# Patient Record
Sex: MALE | Race: WHITE | NOT HISPANIC OR LATINO | Employment: FULL TIME | URBAN - METROPOLITAN AREA
[De-identification: names, ages, dates, MRNs, and addresses within clinical notes are randomized per-mention and may not be internally consistent; named-entity substitution may affect disease eponyms.]

---

## 2021-01-01 ENCOUNTER — APPOINTMENT (OUTPATIENT)
Dept: GENERAL RADIOLOGY | Facility: HOSPITAL | Age: 46
End: 2021-01-01

## 2021-01-01 ENCOUNTER — ANESTHESIA EVENT (OUTPATIENT)
Dept: GASTROENTEROLOGY | Facility: HOSPITAL | Age: 46
End: 2021-01-01

## 2021-01-01 ENCOUNTER — APPOINTMENT (OUTPATIENT)
Dept: CT IMAGING | Facility: HOSPITAL | Age: 46
End: 2021-01-01

## 2021-01-01 ENCOUNTER — APPOINTMENT (OUTPATIENT)
Dept: ULTRASOUND IMAGING | Facility: HOSPITAL | Age: 46
End: 2021-01-01

## 2021-01-01 ENCOUNTER — ANESTHESIA (OUTPATIENT)
Dept: GASTROENTEROLOGY | Facility: HOSPITAL | Age: 46
End: 2021-01-01

## 2021-01-01 ENCOUNTER — APPOINTMENT (OUTPATIENT)
Dept: CARDIOLOGY | Facility: HOSPITAL | Age: 46
End: 2021-01-01

## 2021-01-01 ENCOUNTER — APPOINTMENT (OUTPATIENT)
Dept: MRI IMAGING | Facility: HOSPITAL | Age: 46
End: 2021-01-01

## 2021-01-01 ENCOUNTER — HOSPITAL ENCOUNTER (INPATIENT)
Facility: HOSPITAL | Age: 46
LOS: 14 days | End: 2021-09-04
Attending: EMERGENCY MEDICINE | Admitting: INTERNAL MEDICINE

## 2021-01-01 VITALS
TEMPERATURE: 100 F | OXYGEN SATURATION: 91 % | DIASTOLIC BLOOD PRESSURE: 59 MMHG | BODY MASS INDEX: 34.18 KG/M2 | SYSTOLIC BLOOD PRESSURE: 128 MMHG | HEIGHT: 70 IN | RESPIRATION RATE: 20 BRPM | HEART RATE: 64 BPM | WEIGHT: 238.76 LBS

## 2021-01-01 VITALS — HEART RATE: 59 BPM | RESPIRATION RATE: 28 BRPM | OXYGEN SATURATION: 89 %

## 2021-01-01 DIAGNOSIS — I71.00 DISSECTION OF AORTA, UNSPECIFIED PORTION OF AORTA (HCC): Primary | ICD-10-CM

## 2021-01-01 DIAGNOSIS — J96.01 ACUTE RESPIRATORY FAILURE WITH HYPOXIA (HCC): ICD-10-CM

## 2021-01-01 DIAGNOSIS — J18.9 PNEUMONIA OF BOTH LUNGS DUE TO INFECTIOUS ORGANISM, UNSPECIFIED PART OF LUNG: ICD-10-CM

## 2021-01-01 DIAGNOSIS — N17.9 AKI (ACUTE KIDNEY INJURY) (HCC): ICD-10-CM

## 2021-01-01 DIAGNOSIS — J18.9 PNEUMONIA DUE TO INFECTIOUS ORGANISM, UNSPECIFIED LATERALITY, UNSPECIFIED PART OF LUNG: ICD-10-CM

## 2021-01-01 LAB
ALBUMIN SERPL-MCNC: 2.5 G/DL (ref 3.5–5.2)
ALBUMIN SERPL-MCNC: 2.8 G/DL (ref 3.5–5.2)
ALBUMIN SERPL-MCNC: 2.9 G/DL (ref 3.5–5.2)
ALBUMIN SERPL-MCNC: 3 G/DL (ref 3.5–5.2)
ALBUMIN SERPL-MCNC: 3.1 G/DL (ref 3.5–5.2)
ALBUMIN SERPL-MCNC: 3.2 G/DL (ref 3.5–5.2)
ALBUMIN SERPL-MCNC: 4.4 G/DL (ref 3.5–5.2)
ALBUMIN/GLOB SERPL: 0.7 G/DL
ALBUMIN/GLOB SERPL: 0.7 G/DL
ALBUMIN/GLOB SERPL: 0.8 G/DL
ALBUMIN/GLOB SERPL: 0.8 G/DL
ALBUMIN/GLOB SERPL: 0.9 G/DL
ALBUMIN/GLOB SERPL: 1 G/DL
ALBUMIN/GLOB SERPL: 1.1 G/DL
ALBUMIN/GLOB SERPL: 1.2 G/DL
ALBUMIN/GLOB SERPL: 1.2 G/DL
ALDOST SERPL-MCNC: 23 NG/DL (ref 0–30)
ALP SERPL-CCNC: 101 U/L (ref 39–117)
ALP SERPL-CCNC: 101 U/L (ref 39–117)
ALP SERPL-CCNC: 104 U/L (ref 39–117)
ALP SERPL-CCNC: 106 U/L (ref 39–117)
ALP SERPL-CCNC: 108 U/L (ref 39–117)
ALP SERPL-CCNC: 110 U/L (ref 39–117)
ALP SERPL-CCNC: 111 U/L (ref 39–117)
ALP SERPL-CCNC: 115 U/L (ref 39–117)
ALP SERPL-CCNC: 116 U/L (ref 39–117)
ALP SERPL-CCNC: 143 U/L (ref 39–117)
ALP SERPL-CCNC: 82 U/L (ref 39–117)
ALP SERPL-CCNC: 93 U/L (ref 39–117)
ALP SERPL-CCNC: 98 U/L (ref 39–117)
ALP SERPL-CCNC: 99 U/L (ref 39–117)
ALT SERPL W P-5'-P-CCNC: 24 U/L (ref 1–41)
ALT SERPL W P-5'-P-CCNC: 26 U/L (ref 1–41)
ALT SERPL W P-5'-P-CCNC: 27 U/L (ref 1–41)
ALT SERPL W P-5'-P-CCNC: 28 U/L (ref 1–41)
ALT SERPL W P-5'-P-CCNC: 29 U/L (ref 1–41)
ALT SERPL W P-5'-P-CCNC: 31 U/L (ref 1–41)
ALT SERPL W P-5'-P-CCNC: 35 U/L (ref 1–41)
ALT SERPL W P-5'-P-CCNC: 43 U/L (ref 1–41)
ALT SERPL W P-5'-P-CCNC: 47 U/L (ref 1–41)
ALT SERPL W P-5'-P-CCNC: 74 U/L (ref 1–41)
ALT SERPL W P-5'-P-CCNC: 81 U/L (ref 1–41)
ALT SERPL W P-5'-P-CCNC: 92 U/L (ref 1–41)
AMMONIA BLD-SCNC: 22 UMOL/L (ref 16–60)
AMMONIA BLD-SCNC: 33 UMOL/L (ref 16–60)
AMORPH URATE CRY URNS QL MICRO: ABNORMAL /HPF
ANION GAP SERPL CALCULATED.3IONS-SCNC: 10 MMOL/L (ref 5–15)
ANION GAP SERPL CALCULATED.3IONS-SCNC: 11 MMOL/L (ref 5–15)
ANION GAP SERPL CALCULATED.3IONS-SCNC: 12 MMOL/L (ref 5–15)
ANION GAP SERPL CALCULATED.3IONS-SCNC: 13 MMOL/L (ref 5–15)
ANION GAP SERPL CALCULATED.3IONS-SCNC: 14 MMOL/L (ref 5–15)
ANION GAP SERPL CALCULATED.3IONS-SCNC: 14 MMOL/L (ref 5–15)
ANION GAP SERPL CALCULATED.3IONS-SCNC: 8 MMOL/L (ref 5–15)
ANION GAP SERPL CALCULATED.3IONS-SCNC: 9 MMOL/L (ref 5–15)
APTT PPP: 28.5 SECONDS (ref 24–31)
ARTERIAL PATENCY WRIST A: ABNORMAL
ARTERIAL PATENCY WRIST A: POSITIVE
AST SERPL-CCNC: 13 U/L (ref 1–40)
AST SERPL-CCNC: 14 U/L (ref 1–40)
AST SERPL-CCNC: 17 U/L (ref 1–40)
AST SERPL-CCNC: 18 U/L (ref 1–40)
AST SERPL-CCNC: 18 U/L (ref 1–40)
AST SERPL-CCNC: 19 U/L (ref 1–40)
AST SERPL-CCNC: 20 U/L (ref 1–40)
AST SERPL-CCNC: 26 U/L (ref 1–40)
AST SERPL-CCNC: 29 U/L (ref 1–40)
AST SERPL-CCNC: 29 U/L (ref 1–40)
AST SERPL-CCNC: 31 U/L (ref 1–40)
AST SERPL-CCNC: 38 U/L (ref 1–40)
AST SERPL-CCNC: 55 U/L (ref 1–40)
AST SERPL-CCNC: 56 U/L (ref 1–40)
ATMOSPHERIC PRESS: ABNORMAL MM[HG]
B PARAPERT DNA SPEC QL NAA+PROBE: NOT DETECTED
B PERT DNA SPEC QL NAA+PROBE: DETECTED
B PERT DNA SPEC QL NAA+PROBE: NOT DETECTED
BACTERIA SPEC AEROBE CULT: ABNORMAL
BACTERIA SPEC AEROBE CULT: ABNORMAL
BACTERIA SPEC AEROBE CULT: NORMAL
BACTERIA SPEC RESP CULT: ABNORMAL
BACTERIA SPEC RESP CULT: ABNORMAL
BACTERIA SPEC RESP CULT: NO GROWTH
BACTERIA SPEC RESP CULT: NORMAL
BACTERIA UR QL AUTO: ABNORMAL /HPF
BACTERIA UR QL AUTO: ABNORMAL /HPF
BASE EXCESS BLDA CALC-SCNC: -0.2 MMOL/L (ref 0–3)
BASE EXCESS BLDA CALC-SCNC: -0.5 MMOL/L (ref 0–3)
BASE EXCESS BLDA CALC-SCNC: -0.6 MMOL/L (ref 0–3)
BASE EXCESS BLDA CALC-SCNC: -0.7 MMOL/L (ref 0–3)
BASE EXCESS BLDA CALC-SCNC: -0.8 MMOL/L (ref 0–3)
BASE EXCESS BLDA CALC-SCNC: -1.6 MMOL/L (ref 0–3)
BASE EXCESS BLDA CALC-SCNC: -1.9 MMOL/L (ref 0–3)
BASE EXCESS BLDA CALC-SCNC: -2.4 MMOL/L (ref 0–3)
BASE EXCESS BLDA CALC-SCNC: -2.6 MMOL/L (ref 0–3)
BASE EXCESS BLDA CALC-SCNC: -2.7 MMOL/L (ref 0–3)
BASE EXCESS BLDA CALC-SCNC: -3 MMOL/L (ref 0–3)
BASE EXCESS BLDA CALC-SCNC: -3.2 MMOL/L (ref 0–3)
BASE EXCESS BLDA CALC-SCNC: -3.3 MMOL/L (ref 0–3)
BASE EXCESS BLDA CALC-SCNC: -3.6 MMOL/L (ref 0–3)
BASE EXCESS BLDA CALC-SCNC: -3.7 MMOL/L (ref 0–3)
BASE EXCESS BLDA CALC-SCNC: -3.7 MMOL/L (ref 0–3)
BASE EXCESS BLDA CALC-SCNC: -3.9 MMOL/L (ref 0–3)
BASE EXCESS BLDA CALC-SCNC: -4 MMOL/L (ref 0–3)
BASE EXCESS BLDA CALC-SCNC: -4.2 MMOL/L (ref 0–3)
BASE EXCESS BLDA CALC-SCNC: -4.2 MMOL/L (ref 0–3)
BASE EXCESS BLDA CALC-SCNC: -4.3 MMOL/L (ref 0–3)
BASE EXCESS BLDA CALC-SCNC: -5 MMOL/L (ref 0–3)
BASE EXCESS BLDA CALC-SCNC: -5.5 MMOL/L (ref 0–3)
BASE EXCESS BLDA CALC-SCNC: -7.7 MMOL/L (ref 0–3)
BASE EXCESS BLDA CALC-SCNC: -8.5 MMOL/L (ref 0–3)
BASE EXCESS BLDA CALC-SCNC: 2 MMOL/L (ref 0–3)
BASE EXCESS BLDV CALC-SCNC: -3.7 MMOL/L (ref -2–2)
BASOPHILS # BLD AUTO: 0 10*3/MM3 (ref 0–0.2)
BASOPHILS # BLD AUTO: 0.1 10*3/MM3 (ref 0–0.2)
BASOPHILS # BLD AUTO: 0.1 10*3/MM3 (ref 0–0.2)
BASOPHILS # BLD AUTO: 0.2 10*3/MM3 (ref 0–0.2)
BASOPHILS NFR BLD AUTO: 0.2 % (ref 0–1.5)
BASOPHILS NFR BLD AUTO: 0.2 % (ref 0–1.5)
BASOPHILS NFR BLD AUTO: 0.3 % (ref 0–1.5)
BASOPHILS NFR BLD AUTO: 0.3 % (ref 0–1.5)
BASOPHILS NFR BLD AUTO: 0.6 % (ref 0–1.5)
BASOPHILS NFR BLD AUTO: 1.1 % (ref 0–1.5)
BDY SITE: ABNORMAL
BH CV ECHO MEAS - % IVS THICK: 32.4 %
BH CV ECHO MEAS - % LVPW THICK: 56.3 %
BH CV ECHO MEAS - ACS: 2.1 CM
BH CV ECHO MEAS - AO MAX PG (FULL): 0.26 MMHG
BH CV ECHO MEAS - AO MAX PG: 8.4 MMHG
BH CV ECHO MEAS - AO MEAN PG (FULL): -0.25 MMHG
BH CV ECHO MEAS - AO MEAN PG: 4.1 MMHG
BH CV ECHO MEAS - AO ROOT AREA (BSA CORRECTED): 1.5
BH CV ECHO MEAS - AO ROOT AREA: 7.7 CM^2
BH CV ECHO MEAS - AO ROOT DIAM: 3.1 CM
BH CV ECHO MEAS - AO V2 MAX: 145.2 CM/SEC
BH CV ECHO MEAS - AO V2 MEAN: 94.1 CM/SEC
BH CV ECHO MEAS - AO V2 VTI: 23.1 CM
BH CV ECHO MEAS - AVA(I,A): 3.8 CM^2
BH CV ECHO MEAS - AVA(I,D): 3.8 CM^2
BH CV ECHO MEAS - AVA(V,A): 3.4 CM^2
BH CV ECHO MEAS - AVA(V,D): 3.4 CM^2
BH CV ECHO MEAS - BSA(HAYCOCK): 2.2 M^2
BH CV ECHO MEAS - BSA(HAYCOCK): 2.3 M^2
BH CV ECHO MEAS - BSA: 2.1 M^2
BH CV ECHO MEAS - BSA: 2.2 M^2
BH CV ECHO MEAS - BZI_BMI: 29.7 KILOGRAMS/M^2
BH CV ECHO MEAS - BZI_BMI: 32.4 KILOGRAMS/M^2
BH CV ECHO MEAS - BZI_METRIC_HEIGHT: 177.8 CM
BH CV ECHO MEAS - BZI_METRIC_HEIGHT: 177.8 CM
BH CV ECHO MEAS - BZI_METRIC_WEIGHT: 102.5 KG
BH CV ECHO MEAS - BZI_METRIC_WEIGHT: 93.9 KG
BH CV ECHO MEAS - EDV(CUBED): 130.2 ML
BH CV ECHO MEAS - EDV(MOD-SP4): 110.8 ML
BH CV ECHO MEAS - EDV(MOD-SP4): 77.3 ML
BH CV ECHO MEAS - EDV(TEICH): 122 ML
BH CV ECHO MEAS - EF(CUBED): 68.5 %
BH CV ECHO MEAS - EF(MOD-BP): 53 %
BH CV ECHO MEAS - EF(MOD-BP): 56 %
BH CV ECHO MEAS - EF(MOD-SP4): 53.4 %
BH CV ECHO MEAS - EF(MOD-SP4): 56.4 %
BH CV ECHO MEAS - EF(TEICH): 59.8 %
BH CV ECHO MEAS - ESV(CUBED): 41 ML
BH CV ECHO MEAS - ESV(MOD-SP4): 33.7 ML
BH CV ECHO MEAS - ESV(MOD-SP4): 51.6 ML
BH CV ECHO MEAS - ESV(TEICH): 49.1 ML
BH CV ECHO MEAS - FS: 32 %
BH CV ECHO MEAS - IVS/LVPW: 1
BH CV ECHO MEAS - IVSD: 1.6 CM
BH CV ECHO MEAS - IVSS: 2.2 CM
BH CV ECHO MEAS - LA DIMENSION(2D): 4.2 CM
BH CV ECHO MEAS - LV DIASTOLIC VOL/BSA (35-75): 36.5 ML/M^2
BH CV ECHO MEAS - LV DIASTOLIC VOL/BSA (35-75): 50.4 ML/M^2
BH CV ECHO MEAS - LV MASS(C)D: 368.3 GRAMS
BH CV ECHO MEAS - LV MASS(C)DI: 173.9 GRAMS/M^2
BH CV ECHO MEAS - LV MASS(C)S: 410.8 GRAMS
BH CV ECHO MEAS - LV MASS(C)SI: 194 GRAMS/M^2
BH CV ECHO MEAS - LV MAX PG: 8.2 MMHG
BH CV ECHO MEAS - LV MEAN PG: 4.3 MMHG
BH CV ECHO MEAS - LV SYSTOLIC VOL/BSA (12-30): 15.9 ML/M^2
BH CV ECHO MEAS - LV SYSTOLIC VOL/BSA (12-30): 23.5 ML/M^2
BH CV ECHO MEAS - LV V1 MAX: 142.9 CM/SEC
BH CV ECHO MEAS - LV V1 MEAN: 96.5 CM/SEC
BH CV ECHO MEAS - LV V1 VTI: 25.7 CM
BH CV ECHO MEAS - LVIDD: 5.1 CM
BH CV ECHO MEAS - LVIDS: 3.4 CM
BH CV ECHO MEAS - LVOT AREA: 3.4 CM^2
BH CV ECHO MEAS - LVOT DIAM: 2.1 CM
BH CV ECHO MEAS - LVPWD: 1.6 CM
BH CV ECHO MEAS - LVPWS: 2.5 CM
BH CV ECHO MEAS - MV A MAX VEL: 50.8 CM/SEC
BH CV ECHO MEAS - MV DEC SLOPE: 467.4 CM/SEC^2
BH CV ECHO MEAS - MV DEC TIME: 0.22 SEC
BH CV ECHO MEAS - MV E MAX VEL: 104.7 CM/SEC
BH CV ECHO MEAS - MV E/A: 2.1
BH CV ECHO MEAS - MV MAX PG: 5.7 MMHG
BH CV ECHO MEAS - MV MEAN PG: 1.7 MMHG
BH CV ECHO MEAS - MV V2 MAX: 119.4 CM/SEC
BH CV ECHO MEAS - MV V2 MEAN: 60.8 CM/SEC
BH CV ECHO MEAS - MV V2 VTI: 34.1 CM
BH CV ECHO MEAS - MVA(VTI): 2.6 CM^2
BH CV ECHO MEAS - PA ACC TIME: 0.12 SEC
BH CV ECHO MEAS - PA MAX PG (FULL): 1.1 MMHG
BH CV ECHO MEAS - PA MAX PG: 3.4 MMHG
BH CV ECHO MEAS - PA MEAN PG (FULL): 0.63 MMHG
BH CV ECHO MEAS - PA MEAN PG: 1.8 MMHG
BH CV ECHO MEAS - PA PR(ACCEL): 26.1 MMHG
BH CV ECHO MEAS - PA V2 MAX: 91.7 CM/SEC
BH CV ECHO MEAS - PA V2 MEAN: 63.6 CM/SEC
BH CV ECHO MEAS - PA V2 VTI: 19.1 CM
BH CV ECHO MEAS - PULM A REVS DUR: 0.09 SEC
BH CV ECHO MEAS - PULM A REVS VEL: 38.6 CM/SEC
BH CV ECHO MEAS - PULM DIAS VEL: 58.2 CM/SEC
BH CV ECHO MEAS - PULM S/D: 1.1
BH CV ECHO MEAS - PULM SYS VEL: 61.6 CM/SEC
BH CV ECHO MEAS - RV MAX PG: 2.2 MMHG
BH CV ECHO MEAS - RV MEAN PG: 1.2 MMHG
BH CV ECHO MEAS - RV V1 MAX: 74.8 CM/SEC
BH CV ECHO MEAS - RV V1 MEAN: 50.3 CM/SEC
BH CV ECHO MEAS - RV V1 VTI: 15.7 CM
BH CV ECHO MEAS - RVDD: 3.3 CM
BH CV ECHO MEAS - SI(AO): 83.8 ML/M^2
BH CV ECHO MEAS - SI(CUBED): 42.1 ML/M^2
BH CV ECHO MEAS - SI(LVOT): 41.6 ML/M^2
BH CV ECHO MEAS - SI(MOD-SP4): 20.6 ML/M^2
BH CV ECHO MEAS - SI(MOD-SP4): 26.9 ML/M^2
BH CV ECHO MEAS - SI(TEICH): 34.4 ML/M^2
BH CV ECHO MEAS - SV(AO): 177.5 ML
BH CV ECHO MEAS - SV(CUBED): 89.2 ML
BH CV ECHO MEAS - SV(LVOT): 88 ML
BH CV ECHO MEAS - SV(MOD-SP4): 43.6 ML
BH CV ECHO MEAS - SV(MOD-SP4): 59.2 ML
BH CV ECHO MEAS - SV(TEICH): 72.9 ML
BILIRUB CONJ SERPL-MCNC: 1.1 MG/DL (ref 0–0.3)
BILIRUB INDIRECT SERPL-MCNC: 0.2 MG/DL
BILIRUB SERPL-MCNC: 0.5 MG/DL (ref 0–1.2)
BILIRUB SERPL-MCNC: 0.6 MG/DL (ref 0–1.2)
BILIRUB SERPL-MCNC: 0.7 MG/DL (ref 0–1.2)
BILIRUB SERPL-MCNC: 0.9 MG/DL (ref 0–1.2)
BILIRUB SERPL-MCNC: 1.3 MG/DL (ref 0–1.2)
BILIRUB SERPL-MCNC: 1.7 MG/DL (ref 0–1.2)
BILIRUB SERPL-MCNC: 1.7 MG/DL (ref 0–1.2)
BILIRUB SERPL-MCNC: 1.9 MG/DL (ref 0–1.2)
BILIRUB SERPL-MCNC: 1.9 MG/DL (ref 0–1.2)
BILIRUB SERPL-MCNC: 2 MG/DL (ref 0–1.2)
BILIRUB SERPL-MCNC: 2.1 MG/DL (ref 0–1.2)
BILIRUB SERPL-MCNC: 2.3 MG/DL (ref 0–1.2)
BILIRUB SERPL-MCNC: 2.5 MG/DL (ref 0–1.2)
BILIRUB SERPL-MCNC: 2.6 MG/DL (ref 0–1.2)
BILIRUB UR QL STRIP: NEGATIVE
BILIRUB UR QL STRIP: NEGATIVE
BUN SERPL-MCNC: 101 MG/DL (ref 6–20)
BUN SERPL-MCNC: 19 MG/DL (ref 6–20)
BUN SERPL-MCNC: 28 MG/DL (ref 6–20)
BUN SERPL-MCNC: 39 MG/DL (ref 6–20)
BUN SERPL-MCNC: 45 MG/DL (ref 6–20)
BUN SERPL-MCNC: 48 MG/DL (ref 6–20)
BUN SERPL-MCNC: 51 MG/DL (ref 6–20)
BUN SERPL-MCNC: 65 MG/DL (ref 6–20)
BUN SERPL-MCNC: 70 MG/DL (ref 6–20)
BUN SERPL-MCNC: 8 MG/DL (ref 6–20)
BUN SERPL-MCNC: 81 MG/DL (ref 6–20)
BUN SERPL-MCNC: 83 MG/DL (ref 6–20)
BUN SERPL-MCNC: 85 MG/DL (ref 6–20)
BUN SERPL-MCNC: 85 MG/DL (ref 6–20)
BUN SERPL-MCNC: 88 MG/DL (ref 6–20)
BUN SERPL-MCNC: 89 MG/DL (ref 6–20)
BUN SERPL-MCNC: 91 MG/DL (ref 6–20)
BUN SERPL-MCNC: 92 MG/DL (ref 6–20)
BUN/CREAT SERPL: 12.3 (ref 7–25)
BUN/CREAT SERPL: 12.6 (ref 7–25)
BUN/CREAT SERPL: 15 (ref 7–25)
BUN/CREAT SERPL: 21.7 (ref 7–25)
BUN/CREAT SERPL: 31.8 (ref 7–25)
BUN/CREAT SERPL: 33.1 (ref 7–25)
BUN/CREAT SERPL: 34.2 (ref 7–25)
BUN/CREAT SERPL: 36.3 (ref 7–25)
BUN/CREAT SERPL: 38.1 (ref 7–25)
BUN/CREAT SERPL: 39.6 (ref 7–25)
BUN/CREAT SERPL: 41.3 (ref 7–25)
BUN/CREAT SERPL: 42.3 (ref 7–25)
BUN/CREAT SERPL: 44 (ref 7–25)
BUN/CREAT SERPL: 47.8 (ref 7–25)
BUN/CREAT SERPL: 51.1 (ref 7–25)
BUN/CREAT SERPL: 53 (ref 7–25)
BUN/CREAT SERPL: 53.4 (ref 7–25)
BUN/CREAT SERPL: 8.2 (ref 7–25)
C PNEUM DNA NPH QL NAA+NON-PROBE: NOT DETECTED
CA-I BLDA-SCNC: 1.24 MMOL/L (ref 1.15–1.33)
CA-I SERPL ISE-MCNC: 1.22 MMOL/L (ref 1.2–1.3)
CA-I SERPL ISE-MCNC: 1.22 MMOL/L (ref 1.2–1.3)
CA-I SERPL ISE-MCNC: 1.23 MMOL/L (ref 1.2–1.3)
CA-I SERPL ISE-MCNC: 1.24 MMOL/L (ref 1.2–1.3)
CA-I SERPL ISE-MCNC: 1.25 MMOL/L (ref 1.2–1.3)
CA-I SERPL ISE-MCNC: 1.26 MMOL/L (ref 1.2–1.3)
CA-I SERPL ISE-MCNC: 1.33 MMOL/L (ref 1.2–1.3)
CA-I SERPL ISE-MCNC: 1.38 MMOL/L (ref 1.2–1.3)
CA-I SERPL ISE-MCNC: 1.44 MMOL/L (ref 1.2–1.3)
CALCIUM SPEC-SCNC: 8 MG/DL (ref 8.6–10.5)
CALCIUM SPEC-SCNC: 8.2 MG/DL (ref 8.6–10.5)
CALCIUM SPEC-SCNC: 8.2 MG/DL (ref 8.6–10.5)
CALCIUM SPEC-SCNC: 8.3 MG/DL (ref 8.6–10.5)
CALCIUM SPEC-SCNC: 8.3 MG/DL (ref 8.6–10.5)
CALCIUM SPEC-SCNC: 8.4 MG/DL (ref 8.6–10.5)
CALCIUM SPEC-SCNC: 8.5 MG/DL (ref 8.6–10.5)
CALCIUM SPEC-SCNC: 8.7 MG/DL (ref 8.6–10.5)
CALCIUM SPEC-SCNC: 8.7 MG/DL (ref 8.6–10.5)
CALCIUM SPEC-SCNC: 8.8 MG/DL (ref 8.6–10.5)
CALCIUM SPEC-SCNC: 9.1 MG/DL (ref 8.6–10.5)
CALCIUM SPEC-SCNC: 9.2 MG/DL (ref 8.6–10.5)
CALCIUM SPEC-SCNC: 9.3 MG/DL (ref 8.6–10.5)
CALCIUM SPEC-SCNC: 9.4 MG/DL (ref 8.6–10.5)
CHLORIDE SERPL-SCNC: 100 MMOL/L (ref 98–107)
CHLORIDE SERPL-SCNC: 102 MMOL/L (ref 98–107)
CHLORIDE SERPL-SCNC: 102 MMOL/L (ref 98–107)
CHLORIDE SERPL-SCNC: 105 MMOL/L (ref 98–107)
CHLORIDE SERPL-SCNC: 111 MMOL/L (ref 98–107)
CHLORIDE SERPL-SCNC: 113 MMOL/L (ref 98–107)
CHLORIDE SERPL-SCNC: 113 MMOL/L (ref 98–107)
CHLORIDE SERPL-SCNC: 114 MMOL/L (ref 98–107)
CHLORIDE SERPL-SCNC: 115 MMOL/L (ref 98–107)
CHLORIDE SERPL-SCNC: 116 MMOL/L (ref 98–107)
CHLORIDE SERPL-SCNC: 116 MMOL/L (ref 98–107)
CHLORIDE SERPL-SCNC: 118 MMOL/L (ref 98–107)
CHLORIDE SERPL-SCNC: 130 MMOL/L (ref 98–107)
CHLORIDE SERPL-SCNC: 130 MMOL/L (ref 98–107)
CHLORIDE SERPL-SCNC: 94 MMOL/L (ref 98–107)
CHOLEST SERPL-MCNC: 140 MG/DL (ref 0–200)
CHOLEST SERPL-MCNC: 99 MG/DL (ref 0–200)
CK SERPL-CCNC: 29 U/L (ref 20–200)
CLARITY UR: CLEAR
CLARITY UR: CLEAR
CMV DNA SPEC QL NAA+PROBE: NEGATIVE
CMV DNA SPEC QL NAA+PROBE: NEGATIVE
CO2 BLDA-SCNC: 20.3 MMOL/L (ref 22–29)
CO2 BLDA-SCNC: 20.4 MMOL/L (ref 22–29)
CO2 BLDA-SCNC: 20.8 MMOL/L (ref 22–29)
CO2 BLDA-SCNC: 21.1 MMOL/L (ref 22–29)
CO2 BLDA-SCNC: 21.5 MMOL/L (ref 22–29)
CO2 BLDA-SCNC: 21.7 MMOL/L (ref 22–29)
CO2 BLDA-SCNC: 21.9 MMOL/L (ref 22–29)
CO2 BLDA-SCNC: 22.5 MMOL/L (ref 22–29)
CO2 BLDA-SCNC: 23.1 MMOL/L (ref 22–29)
CO2 BLDA-SCNC: 23.2 MMOL/L (ref 22–29)
CO2 BLDA-SCNC: 23.7 MMOL/L (ref 22–29)
CO2 BLDA-SCNC: 23.7 MMOL/L (ref 22–29)
CO2 BLDA-SCNC: 23.9 MMOL/L (ref 22–29)
CO2 BLDA-SCNC: 24.2 MMOL/L (ref 22–29)
CO2 BLDA-SCNC: 24.6 MMOL/L (ref 22–29)
CO2 BLDA-SCNC: 24.7 MMOL/L (ref 22–29)
CO2 BLDA-SCNC: 25 MMOL/L (ref 22–29)
CO2 BLDA-SCNC: 25.7 MMOL/L (ref 22–29)
CO2 BLDA-SCNC: 26 MMOL/L (ref 22–29)
CO2 BLDA-SCNC: 26.4 MMOL/L (ref 22–29)
CO2 BLDA-SCNC: 26.4 MMOL/L (ref 22–29)
CO2 BLDA-SCNC: 27.1 MMOL/L (ref 22–29)
CO2 BLDA-SCNC: 27.7 MMOL/L (ref 22–29)
CO2 BLDA-SCNC: 29.2 MMOL/L (ref 22–29)
CO2 SERPL-SCNC: 18 MMOL/L (ref 22–29)
CO2 SERPL-SCNC: 20 MMOL/L (ref 22–29)
CO2 SERPL-SCNC: 21 MMOL/L (ref 22–29)
CO2 SERPL-SCNC: 22 MMOL/L (ref 22–29)
CO2 SERPL-SCNC: 23 MMOL/L (ref 22–29)
CO2 SERPL-SCNC: 23 MMOL/L (ref 22–29)
CO2 SERPL-SCNC: 24 MMOL/L (ref 22–29)
CO2 SERPL-SCNC: 26 MMOL/L (ref 22–29)
CO2 SERPL-SCNC: 30 MMOL/L (ref 22–29)
COLOR UR: YELLOW
COLOR UR: YELLOW
CREAT SERPL-MCNC: 0.98 MG/DL (ref 0.76–1.27)
CREAT SERPL-MCNC: 1.51 MG/DL (ref 0.76–1.27)
CREAT SERPL-MCNC: 1.51 MG/DL (ref 0.76–1.27)
CREAT SERPL-MCNC: 1.54 MG/DL (ref 0.76–1.27)
CREAT SERPL-MCNC: 1.68 MG/DL (ref 0.76–1.27)
CREAT SERPL-MCNC: 1.8 MG/DL (ref 0.76–1.27)
CREAT SERPL-MCNC: 1.84 MG/DL (ref 0.76–1.27)
CREAT SERPL-MCNC: 1.84 MG/DL (ref 0.76–1.27)
CREAT SERPL-MCNC: 1.89 MG/DL (ref 0.76–1.27)
CREAT SERPL-MCNC: 1.9 MG/DL (ref 0.76–1.27)
CREAT SERPL-MCNC: 1.93 MG/DL (ref 0.76–1.27)
CREAT SERPL-MCNC: 1.96 MG/DL (ref 0.76–1.27)
CREAT SERPL-MCNC: 2.06 MG/DL (ref 0.76–1.27)
CREAT SERPL-MCNC: 2.07 MG/DL (ref 0.76–1.27)
CREAT SERPL-MCNC: 2.23 MG/DL (ref 0.76–1.27)
CREAT SERPL-MCNC: 2.27 MG/DL (ref 0.76–1.27)
CREAT SERPL-MCNC: 2.3 MG/DL (ref 0.76–1.27)
CREAT SERPL-MCNC: 2.6 MG/DL (ref 0.76–1.27)
CRP SERPL-MCNC: 19.71 MG/DL (ref 0–0.5)
CRP SERPL-MCNC: 24.35 MG/DL (ref 0–0.5)
CRP SERPL-MCNC: 27.89 MG/DL (ref 0–0.5)
D DIMER PPP FEU-MCNC: >35.2 MG/L (FEU) (ref 0–0.59)
D-LACTATE SERPL-SCNC: 1 MMOL/L (ref 0.5–2)
D-LACTATE SERPL-SCNC: 1 MMOL/L (ref 0.5–2)
D-LACTATE SERPL-SCNC: 1.2 MMOL/L (ref 0.5–2)
D-LACTATE SERPL-SCNC: 1.4 MMOL/L (ref 0.5–2)
D-LACTATE SERPL-SCNC: 2 MMOL/L (ref 0.5–2)
DEPRECATED RDW RBC AUTO: 42.9 FL (ref 37–54)
DEPRECATED RDW RBC AUTO: 44.6 FL (ref 37–54)
DEPRECATED RDW RBC AUTO: 45.1 FL (ref 37–54)
DEPRECATED RDW RBC AUTO: 45.5 FL (ref 37–54)
DEPRECATED RDW RBC AUTO: 45.5 FL (ref 37–54)
DEPRECATED RDW RBC AUTO: 47.3 FL (ref 37–54)
DEPRECATED RDW RBC AUTO: 47.7 FL (ref 37–54)
DEPRECATED RDW RBC AUTO: 48.6 FL (ref 37–54)
DEPRECATED RDW RBC AUTO: 49.4 FL (ref 37–54)
DEPRECATED RDW RBC AUTO: 49.9 FL (ref 37–54)
DEPRECATED RDW RBC AUTO: 51.2 FL (ref 37–54)
DEPRECATED RDW RBC AUTO: 51.2 FL (ref 37–54)
DEPRECATED RDW RBC AUTO: 51.6 FL (ref 37–54)
DEPRECATED RDW RBC AUTO: 52.5 FL (ref 37–54)
DEPRECATED RDW RBC AUTO: 53.4 FL (ref 37–54)
EOSINOPHIL # BLD AUTO: 0 10*3/MM3 (ref 0–0.4)
EOSINOPHIL # BLD AUTO: 0.1 10*3/MM3 (ref 0–0.4)
EOSINOPHIL # BLD AUTO: 0.2 10*3/MM3 (ref 0–0.4)
EOSINOPHIL # BLD MANUAL: 0.15 10*3/MM3 (ref 0–0.4)
EOSINOPHIL NFR BLD AUTO: 0.1 % (ref 0.3–6.2)
EOSINOPHIL NFR BLD AUTO: 0.2 % (ref 0.3–6.2)
EOSINOPHIL NFR BLD AUTO: 0.5 % (ref 0.3–6.2)
EOSINOPHIL NFR BLD AUTO: 1.4 % (ref 0.3–6.2)
EOSINOPHIL NFR BLD MANUAL: 1 % (ref 0.3–6.2)
ERYTHROCYTE [DISTWIDTH] IN BLOOD BY AUTOMATED COUNT: 13.1 % (ref 12.3–15.4)
ERYTHROCYTE [DISTWIDTH] IN BLOOD BY AUTOMATED COUNT: 13.6 % (ref 12.3–15.4)
ERYTHROCYTE [DISTWIDTH] IN BLOOD BY AUTOMATED COUNT: 13.7 % (ref 12.3–15.4)
ERYTHROCYTE [DISTWIDTH] IN BLOOD BY AUTOMATED COUNT: 13.9 % (ref 12.3–15.4)
ERYTHROCYTE [DISTWIDTH] IN BLOOD BY AUTOMATED COUNT: 13.9 % (ref 12.3–15.4)
ERYTHROCYTE [DISTWIDTH] IN BLOOD BY AUTOMATED COUNT: 14.3 % (ref 12.3–15.4)
ERYTHROCYTE [DISTWIDTH] IN BLOOD BY AUTOMATED COUNT: 14.5 % (ref 12.3–15.4)
ERYTHROCYTE [DISTWIDTH] IN BLOOD BY AUTOMATED COUNT: 14.6 % (ref 12.3–15.4)
ERYTHROCYTE [DISTWIDTH] IN BLOOD BY AUTOMATED COUNT: 14.9 % (ref 12.3–15.4)
ERYTHROCYTE [DISTWIDTH] IN BLOOD BY AUTOMATED COUNT: 15.2 % (ref 12.3–15.4)
ERYTHROCYTE [DISTWIDTH] IN BLOOD BY AUTOMATED COUNT: 15.3 % (ref 12.3–15.4)
FERRITIN SERPL-MCNC: 1102 NG/ML (ref 30–400)
FLUAV H1 2009 PAND RNA NPH QL NAA+PROBE: NOT DETECTED
FLUAV H1 HA GENE NPH QL NAA+PROBE: NOT DETECTED
FLUAV H3 RNA NPH QL NAA+PROBE: NOT DETECTED
FLUAV SUBTYP SPEC NAA+PROBE: NOT DETECTED
FLUBV RNA ISLT QL NAA+PROBE: NOT DETECTED
GALACTOMANNAN AG SPEC IA-ACNC: 0.08 INDEX (ref 0–0.49)
GALACTOMANNAN AG SPEC IA-ACNC: 0.09 INDEX (ref 0–0.49)
GFR SERPL CREATININE-BSD FRML MDRD: 26 ML/MIN/1.73
GFR SERPL CREATININE-BSD FRML MDRD: 31 ML/MIN/1.73
GFR SERPL CREATININE-BSD FRML MDRD: 31 ML/MIN/1.73
GFR SERPL CREATININE-BSD FRML MDRD: 32 ML/MIN/1.73
GFR SERPL CREATININE-BSD FRML MDRD: 34 ML/MIN/1.73
GFR SERPL CREATININE-BSD FRML MDRD: 35 ML/MIN/1.73
GFR SERPL CREATININE-BSD FRML MDRD: 37 ML/MIN/1.73
GFR SERPL CREATININE-BSD FRML MDRD: 37 ML/MIN/1.73
GFR SERPL CREATININE-BSD FRML MDRD: 38 ML/MIN/1.73
GFR SERPL CREATININE-BSD FRML MDRD: 38 ML/MIN/1.73
GFR SERPL CREATININE-BSD FRML MDRD: 39 ML/MIN/1.73
GFR SERPL CREATININE-BSD FRML MDRD: 39 ML/MIN/1.73
GFR SERPL CREATININE-BSD FRML MDRD: 40 ML/MIN/1.73
GFR SERPL CREATININE-BSD FRML MDRD: 44 ML/MIN/1.73
GFR SERPL CREATININE-BSD FRML MDRD: 48 ML/MIN/1.73
GFR SERPL CREATININE-BSD FRML MDRD: 50 ML/MIN/1.73
GFR SERPL CREATININE-BSD FRML MDRD: 50 ML/MIN/1.73
GFR SERPL CREATININE-BSD FRML MDRD: 82 ML/MIN/1.73
GFR SERPL CREATININE-BSD FRML MDRD: 99 ML/MIN/1.73
GLOBULIN UR ELPH-MCNC: 2.6 GM/DL
GLOBULIN UR ELPH-MCNC: 2.7 GM/DL
GLOBULIN UR ELPH-MCNC: 3.1 GM/DL
GLOBULIN UR ELPH-MCNC: 3.2 GM/DL
GLOBULIN UR ELPH-MCNC: 3.3 GM/DL
GLOBULIN UR ELPH-MCNC: 3.3 GM/DL
GLOBULIN UR ELPH-MCNC: 3.5 GM/DL
GLOBULIN UR ELPH-MCNC: 3.6 GM/DL
GLOBULIN UR ELPH-MCNC: 3.6 GM/DL
GLUCOSE BLDC GLUCOMTR-MCNC: 100 MG/DL (ref 70–105)
GLUCOSE BLDC GLUCOMTR-MCNC: 110 MG/DL (ref 70–105)
GLUCOSE BLDC GLUCOMTR-MCNC: 119 MG/DL (ref 70–105)
GLUCOSE BLDC GLUCOMTR-MCNC: 120 MG/DL (ref 70–105)
GLUCOSE BLDC GLUCOMTR-MCNC: 121 MG/DL (ref 70–105)
GLUCOSE BLDC GLUCOMTR-MCNC: 124 MG/DL (ref 70–105)
GLUCOSE BLDC GLUCOMTR-MCNC: 127 MG/DL (ref 70–105)
GLUCOSE BLDC GLUCOMTR-MCNC: 130 MG/DL (ref 70–105)
GLUCOSE BLDC GLUCOMTR-MCNC: 132 MG/DL (ref 74–100)
GLUCOSE BLDC GLUCOMTR-MCNC: 133 MG/DL (ref 70–105)
GLUCOSE BLDC GLUCOMTR-MCNC: 134 MG/DL (ref 70–105)
GLUCOSE BLDC GLUCOMTR-MCNC: 138 MG/DL (ref 70–105)
GLUCOSE BLDC GLUCOMTR-MCNC: 140 MG/DL (ref 70–105)
GLUCOSE BLDC GLUCOMTR-MCNC: 141 MG/DL (ref 70–105)
GLUCOSE BLDC GLUCOMTR-MCNC: 143 MG/DL (ref 70–105)
GLUCOSE BLDC GLUCOMTR-MCNC: 145 MG/DL (ref 70–105)
GLUCOSE BLDC GLUCOMTR-MCNC: 145 MG/DL (ref 70–105)
GLUCOSE BLDC GLUCOMTR-MCNC: 146 MG/DL (ref 70–105)
GLUCOSE BLDC GLUCOMTR-MCNC: 147 MG/DL (ref 70–105)
GLUCOSE BLDC GLUCOMTR-MCNC: 148 MG/DL (ref 70–105)
GLUCOSE BLDC GLUCOMTR-MCNC: 149 MG/DL (ref 70–105)
GLUCOSE BLDC GLUCOMTR-MCNC: 152 MG/DL (ref 70–105)
GLUCOSE BLDC GLUCOMTR-MCNC: 152 MG/DL (ref 70–105)
GLUCOSE BLDC GLUCOMTR-MCNC: 153 MG/DL (ref 70–105)
GLUCOSE BLDC GLUCOMTR-MCNC: 154 MG/DL (ref 70–105)
GLUCOSE BLDC GLUCOMTR-MCNC: 155 MG/DL (ref 70–105)
GLUCOSE BLDC GLUCOMTR-MCNC: 158 MG/DL (ref 70–105)
GLUCOSE BLDC GLUCOMTR-MCNC: 162 MG/DL (ref 70–105)
GLUCOSE BLDC GLUCOMTR-MCNC: 162 MG/DL (ref 70–105)
GLUCOSE BLDC GLUCOMTR-MCNC: 163 MG/DL (ref 70–105)
GLUCOSE BLDC GLUCOMTR-MCNC: 164 MG/DL (ref 70–105)
GLUCOSE BLDC GLUCOMTR-MCNC: 164 MG/DL (ref 70–105)
GLUCOSE BLDC GLUCOMTR-MCNC: 166 MG/DL (ref 70–105)
GLUCOSE BLDC GLUCOMTR-MCNC: 168 MG/DL (ref 70–105)
GLUCOSE BLDC GLUCOMTR-MCNC: 168 MG/DL (ref 74–100)
GLUCOSE BLDC GLUCOMTR-MCNC: 169 MG/DL (ref 70–105)
GLUCOSE BLDC GLUCOMTR-MCNC: 173 MG/DL (ref 70–105)
GLUCOSE BLDC GLUCOMTR-MCNC: 173 MG/DL (ref 70–105)
GLUCOSE BLDC GLUCOMTR-MCNC: 188 MG/DL (ref 70–105)
GLUCOSE BLDC GLUCOMTR-MCNC: 194 MG/DL (ref 70–105)
GLUCOSE BLDC GLUCOMTR-MCNC: 195 MG/DL (ref 70–105)
GLUCOSE BLDC GLUCOMTR-MCNC: 205 MG/DL (ref 70–105)
GLUCOSE BLDC GLUCOMTR-MCNC: 206 MG/DL (ref 70–105)
GLUCOSE BLDC GLUCOMTR-MCNC: 206 MG/DL (ref 70–105)
GLUCOSE BLDC GLUCOMTR-MCNC: 208 MG/DL (ref 70–105)
GLUCOSE BLDC GLUCOMTR-MCNC: 210 MG/DL (ref 70–105)
GLUCOSE BLDC GLUCOMTR-MCNC: 210 MG/DL (ref 70–105)
GLUCOSE BLDC GLUCOMTR-MCNC: 217 MG/DL (ref 70–105)
GLUCOSE BLDC GLUCOMTR-MCNC: 223 MG/DL (ref 70–105)
GLUCOSE BLDC GLUCOMTR-MCNC: 225 MG/DL (ref 70–105)
GLUCOSE BLDC GLUCOMTR-MCNC: 227 MG/DL (ref 70–105)
GLUCOSE BLDC GLUCOMTR-MCNC: 231 MG/DL (ref 70–105)
GLUCOSE BLDC GLUCOMTR-MCNC: 255 MG/DL (ref 70–105)
GLUCOSE BLDC GLUCOMTR-MCNC: 256 MG/DL (ref 70–105)
GLUCOSE BLDC GLUCOMTR-MCNC: 257 MG/DL (ref 74–100)
GLUCOSE BLDC GLUCOMTR-MCNC: 257 MG/DL (ref 74–100)
GLUCOSE BLDC GLUCOMTR-MCNC: 259 MG/DL (ref 70–105)
GLUCOSE BLDC GLUCOMTR-MCNC: 260 MG/DL (ref 70–105)
GLUCOSE BLDC GLUCOMTR-MCNC: 266 MG/DL (ref 70–105)
GLUCOSE BLDC GLUCOMTR-MCNC: 286 MG/DL (ref 70–105)
GLUCOSE BLDC GLUCOMTR-MCNC: 293 MG/DL (ref 70–105)
GLUCOSE BLDC GLUCOMTR-MCNC: 302 MG/DL (ref 70–105)
GLUCOSE BLDC GLUCOMTR-MCNC: 321 MG/DL (ref 70–105)
GLUCOSE BLDC GLUCOMTR-MCNC: 355 MG/DL (ref 70–105)
GLUCOSE BLDC GLUCOMTR-MCNC: 355 MG/DL (ref 70–105)
GLUCOSE BLDC GLUCOMTR-MCNC: 375 MG/DL (ref 70–105)
GLUCOSE BLDC GLUCOMTR-MCNC: 378 MG/DL (ref 70–105)
GLUCOSE BLDC GLUCOMTR-MCNC: 378 MG/DL (ref 70–105)
GLUCOSE BLDC GLUCOMTR-MCNC: 395 MG/DL (ref 70–105)
GLUCOSE BLDC GLUCOMTR-MCNC: 402 MG/DL (ref 70–105)
GLUCOSE BLDC GLUCOMTR-MCNC: 415 MG/DL (ref 70–105)
GLUCOSE BLDC GLUCOMTR-MCNC: 422 MG/DL (ref 70–105)
GLUCOSE BLDC GLUCOMTR-MCNC: 88 MG/DL (ref 70–105)
GLUCOSE BLDC GLUCOMTR-MCNC: 88 MG/DL (ref 70–105)
GLUCOSE BLDC GLUCOMTR-MCNC: 94 MG/DL (ref 70–105)
GLUCOSE BLDC GLUCOMTR-MCNC: 97 MG/DL (ref 70–105)
GLUCOSE SERPL-MCNC: 125 MG/DL (ref 65–99)
GLUCOSE SERPL-MCNC: 132 MG/DL (ref 65–99)
GLUCOSE SERPL-MCNC: 132 MG/DL (ref 65–99)
GLUCOSE SERPL-MCNC: 135 MG/DL (ref 65–99)
GLUCOSE SERPL-MCNC: 136 MG/DL (ref 65–99)
GLUCOSE SERPL-MCNC: 147 MG/DL (ref 65–99)
GLUCOSE SERPL-MCNC: 149 MG/DL (ref 65–99)
GLUCOSE SERPL-MCNC: 150 MG/DL (ref 65–99)
GLUCOSE SERPL-MCNC: 158 MG/DL (ref 65–99)
GLUCOSE SERPL-MCNC: 164 MG/DL (ref 65–99)
GLUCOSE SERPL-MCNC: 165 MG/DL (ref 65–99)
GLUCOSE SERPL-MCNC: 177 MG/DL (ref 65–99)
GLUCOSE SERPL-MCNC: 216 MG/DL (ref 65–99)
GLUCOSE SERPL-MCNC: 222 MG/DL (ref 65–99)
GLUCOSE SERPL-MCNC: 225 MG/DL (ref 65–99)
GLUCOSE SERPL-MCNC: 340 MG/DL (ref 65–99)
GLUCOSE SERPL-MCNC: 450 MG/DL (ref 65–99)
GLUCOSE SERPL-MCNC: 85 MG/DL (ref 65–99)
GLUCOSE UR STRIP-MCNC: NEGATIVE MG/DL
GLUCOSE UR STRIP-MCNC: NEGATIVE MG/DL
GRAM STN SPEC: ABNORMAL
GRAM STN SPEC: NORMAL
GRAN CASTS URNS QL MICRO: ABNORMAL /LPF
HADV DNA SPEC NAA+PROBE: NOT DETECTED
HBA1C MFR BLD: 6 % (ref 3.5–5.6)
HBV SURFACE AG SERPL QL IA: NORMAL
HCO3 BLDA-SCNC: 19 MMOL/L (ref 21–28)
HCO3 BLDA-SCNC: 19.3 MMOL/L (ref 21–28)
HCO3 BLDA-SCNC: 19.8 MMOL/L (ref 21–28)
HCO3 BLDA-SCNC: 20.1 MMOL/L (ref 21–28)
HCO3 BLDA-SCNC: 20.6 MMOL/L (ref 21–28)
HCO3 BLDA-SCNC: 20.6 MMOL/L (ref 21–28)
HCO3 BLDA-SCNC: 20.9 MMOL/L (ref 21–28)
HCO3 BLDA-SCNC: 20.9 MMOL/L (ref 21–28)
HCO3 BLDA-SCNC: 21.3 MMOL/L (ref 21–28)
HCO3 BLDA-SCNC: 21.4 MMOL/L (ref 21–28)
HCO3 BLDA-SCNC: 21.5 MMOL/L (ref 21–28)
HCO3 BLDA-SCNC: 21.8 MMOL/L (ref 21–28)
HCO3 BLDA-SCNC: 22.1 MMOL/L (ref 21–28)
HCO3 BLDA-SCNC: 22.4 MMOL/L (ref 21–28)
HCO3 BLDA-SCNC: 22.4 MMOL/L (ref 21–28)
HCO3 BLDA-SCNC: 22.7 MMOL/L (ref 21–28)
HCO3 BLDA-SCNC: 23.3 MMOL/L (ref 21–28)
HCO3 BLDA-SCNC: 23.5 MMOL/L (ref 21–28)
HCO3 BLDA-SCNC: 23.8 MMOL/L (ref 21–28)
HCO3 BLDA-SCNC: 24.5 MMOL/L (ref 21–28)
HCO3 BLDA-SCNC: 24.7 MMOL/L (ref 21–28)
HCO3 BLDA-SCNC: 25.1 MMOL/L (ref 21–28)
HCO3 BLDA-SCNC: 26.5 MMOL/L (ref 21–28)
HCO3 BLDA-SCNC: 27.1 MMOL/L (ref 21–28)
HCO3 BLDV-SCNC: 22.6 MMOL/L (ref 22–26)
HCOV 229E RNA SPEC QL NAA+PROBE: NOT DETECTED
HCOV HKU1 RNA SPEC QL NAA+PROBE: NOT DETECTED
HCOV NL63 RNA SPEC QL NAA+PROBE: NOT DETECTED
HCOV OC43 RNA SPEC QL NAA+PROBE: NOT DETECTED
HCT VFR BLD AUTO: 31.5 % (ref 37.5–51)
HCT VFR BLD AUTO: 32.2 % (ref 37.5–51)
HCT VFR BLD AUTO: 32.8 % (ref 37.5–51)
HCT VFR BLD AUTO: 33.4 % (ref 37.5–51)
HCT VFR BLD AUTO: 34.3 % (ref 37.5–51)
HCT VFR BLD AUTO: 34.9 % (ref 37.5–51)
HCT VFR BLD AUTO: 35.1 % (ref 37.5–51)
HCT VFR BLD AUTO: 35.1 % (ref 37.5–51)
HCT VFR BLD AUTO: 35.8 % (ref 37.5–51)
HCT VFR BLD AUTO: 35.8 % (ref 37.5–51)
HCT VFR BLD AUTO: 36.3 % (ref 37.5–51)
HCT VFR BLD AUTO: 37.1 % (ref 37.5–51)
HCT VFR BLD AUTO: 38.8 % (ref 37.5–51)
HCT VFR BLD AUTO: 39.7 % (ref 37.5–51)
HCT VFR BLD AUTO: 40 % (ref 37.5–51)
HCT VFR BLD AUTO: 40.1 % (ref 37.5–51)
HCT VFR BLD AUTO: 40.3 % (ref 37.5–51)
HCT VFR BLD AUTO: 41.1 % (ref 37.5–51)
HCT VFR BLD AUTO: 51.9 % (ref 37.5–51)
HCT VFR BLDA CALC: 37 % (ref 38–51)
HDLC SERPL-MCNC: 42 MG/DL (ref 40–60)
HEMODILUTION: NO
HGB BLD-MCNC: 10.5 G/DL (ref 13–17.7)
HGB BLD-MCNC: 10.5 G/DL (ref 13–17.7)
HGB BLD-MCNC: 10.9 G/DL (ref 13–17.7)
HGB BLD-MCNC: 11.1 G/DL (ref 13–17.7)
HGB BLD-MCNC: 11.4 G/DL (ref 13–17.7)
HGB BLD-MCNC: 11.4 G/DL (ref 13–17.7)
HGB BLD-MCNC: 11.6 G/DL (ref 13–17.7)
HGB BLD-MCNC: 11.7 G/DL (ref 13–17.7)
HGB BLD-MCNC: 11.7 G/DL (ref 13–17.7)
HGB BLD-MCNC: 11.8 G/DL (ref 13–17.7)
HGB BLD-MCNC: 12 G/DL (ref 13–17.7)
HGB BLD-MCNC: 12.6 G/DL (ref 13–17.7)
HGB BLD-MCNC: 13 G/DL (ref 13–17.7)
HGB BLD-MCNC: 13.1 G/DL (ref 13–17.7)
HGB BLD-MCNC: 13.1 G/DL (ref 13–17.7)
HGB BLD-MCNC: 13.3 G/DL (ref 13–17.7)
HGB BLD-MCNC: 14.1 G/DL (ref 13–17.7)
HGB BLD-MCNC: 14.1 G/DL (ref 13–17.7)
HGB BLD-MCNC: 17.5 G/DL (ref 13–17.7)
HGB BLDA-MCNC: 12.7 G/DL (ref 12–17)
HGB UR QL STRIP.AUTO: ABNORMAL
HGB UR QL STRIP.AUTO: NEGATIVE
HIV1+2 AB SER QL: NORMAL
HMPV RNA NPH QL NAA+NON-PROBE: NOT DETECTED
HPIV1 RNA SPEC QL NAA+PROBE: NOT DETECTED
HPIV2 RNA SPEC QL NAA+PROBE: NOT DETECTED
HPIV3 RNA NPH QL NAA+PROBE: NOT DETECTED
HPIV4 P GENE NPH QL NAA+PROBE: NOT DETECTED
HYALINE CASTS UR QL AUTO: ABNORMAL /LPF
INHALED O2 CONCENTRATION: 100 %
INHALED O2 CONCENTRATION: 70 %
INHALED O2 CONCENTRATION: 75 %
INHALED O2 CONCENTRATION: 80 %
INHALED O2 CONCENTRATION: 90 %
INR PPP: 1.04 (ref 0.93–1.1)
INR PPP: 1.18 (ref 0.93–1.1)
INTERPRETATION: NEGATIVE
INTERPRETATION: NEGATIVE
KETONES UR QL STRIP: NEGATIVE
KETONES UR QL STRIP: NEGATIVE
L PNEUMO1 AG UR QL IA: NEGATIVE
LAB AP CASE REPORT: NORMAL
LAB AP CLINICAL INFORMATION: NORMAL
LARGE PLATELETS: ABNORMAL
LDH SERPL-CCNC: 215 U/L (ref 135–225)
LDLC SERPL CALC-MCNC: 83 MG/DL (ref 0–100)
LDLC/HDLC SERPL: 1.96 {RATIO}
LEUKOCYTE ESTERASE UR QL STRIP.AUTO: ABNORMAL
LEUKOCYTE ESTERASE UR QL STRIP.AUTO: NEGATIVE
LYMPHOCYTES # BLD AUTO: 0.4 10*3/MM3 (ref 0.7–3.1)
LYMPHOCYTES # BLD AUTO: 0.7 10*3/MM3 (ref 0.7–3.1)
LYMPHOCYTES # BLD AUTO: 1.2 10*3/MM3 (ref 0.7–3.1)
LYMPHOCYTES # BLD AUTO: 1.2 10*3/MM3 (ref 0.7–3.1)
LYMPHOCYTES # BLD AUTO: 1.4 10*3/MM3 (ref 0.7–3.1)
LYMPHOCYTES # BLD AUTO: 1.6 10*3/MM3 (ref 0.7–3.1)
LYMPHOCYTES # BLD MANUAL: 0.23 10*3/MM3 (ref 0.7–3.1)
LYMPHOCYTES # BLD MANUAL: 0.3 10*3/MM3 (ref 0.7–3.1)
LYMPHOCYTES # BLD MANUAL: 0.32 10*3/MM3 (ref 0.7–3.1)
LYMPHOCYTES # BLD MANUAL: 0.4 10*3/MM3 (ref 0.7–3.1)
LYMPHOCYTES # BLD MANUAL: 0.63 10*3/MM3 (ref 0.7–3.1)
LYMPHOCYTES # BLD MANUAL: 0.86 10*3/MM3 (ref 0.7–3.1)
LYMPHOCYTES # BLD MANUAL: 0.86 10*3/MM3 (ref 0.7–3.1)
LYMPHOCYTES # BLD MANUAL: 0.87 10*3/MM3 (ref 0.7–3.1)
LYMPHOCYTES # BLD MANUAL: 1.11 10*3/MM3 (ref 0.7–3.1)
LYMPHOCYTES NFR BLD AUTO: 2.4 % (ref 19.6–45.3)
LYMPHOCYTES NFR BLD AUTO: 4.4 % (ref 19.6–45.3)
LYMPHOCYTES NFR BLD AUTO: 6.7 % (ref 19.6–45.3)
LYMPHOCYTES NFR BLD AUTO: 7.8 % (ref 19.6–45.3)
LYMPHOCYTES NFR BLD AUTO: 8.5 % (ref 19.6–45.3)
LYMPHOCYTES NFR BLD AUTO: 9.6 % (ref 19.6–45.3)
LYMPHOCYTES NFR BLD MANUAL: 1 % (ref 19.6–45.3)
LYMPHOCYTES NFR BLD MANUAL: 1 % (ref 19.6–45.3)
LYMPHOCYTES NFR BLD MANUAL: 1 % (ref 5–12)
LYMPHOCYTES NFR BLD MANUAL: 10 % (ref 5–12)
LYMPHOCYTES NFR BLD MANUAL: 11 % (ref 5–12)
LYMPHOCYTES NFR BLD MANUAL: 12 % (ref 5–12)
LYMPHOCYTES NFR BLD MANUAL: 18 % (ref 5–12)
LYMPHOCYTES NFR BLD MANUAL: 2 % (ref 19.6–45.3)
LYMPHOCYTES NFR BLD MANUAL: 2 % (ref 19.6–45.3)
LYMPHOCYTES NFR BLD MANUAL: 3 % (ref 19.6–45.3)
LYMPHOCYTES NFR BLD MANUAL: 3 % (ref 19.6–45.3)
LYMPHOCYTES NFR BLD MANUAL: 4 % (ref 19.6–45.3)
LYMPHOCYTES NFR BLD MANUAL: 4 % (ref 5–12)
LYMPHOCYTES NFR BLD MANUAL: 5 % (ref 19.6–45.3)
LYMPHOCYTES NFR BLD MANUAL: 6 % (ref 19.6–45.3)
LYMPHOCYTES NFR BLD MANUAL: 8 % (ref 5–12)
M PNEUMO IGG SER IA-ACNC: NOT DETECTED
MACROCYTES BLD QL SMEAR: ABNORMAL
MAGNESIUM SERPL-MCNC: 1.7 MG/DL (ref 1.6–2.6)
MAGNESIUM SERPL-MCNC: 1.8 MG/DL (ref 1.6–2.6)
MAGNESIUM SERPL-MCNC: 2.1 MG/DL (ref 1.6–2.6)
MAGNESIUM SERPL-MCNC: 2.1 MG/DL (ref 1.6–2.6)
MAGNESIUM SERPL-MCNC: 2.3 MG/DL (ref 1.6–2.6)
MAGNESIUM SERPL-MCNC: 2.4 MG/DL (ref 1.6–2.6)
MAGNESIUM SERPL-MCNC: 2.5 MG/DL (ref 1.6–2.6)
MAGNESIUM SERPL-MCNC: 2.6 MG/DL (ref 1.6–2.6)
MAGNESIUM SERPL-MCNC: 2.6 MG/DL (ref 1.6–2.6)
MAGNESIUM SERPL-MCNC: 2.7 MG/DL (ref 1.6–2.6)
MAGNESIUM SERPL-MCNC: 2.9 MG/DL (ref 1.6–2.6)
MAGNESIUM SERPL-MCNC: 3 MG/DL (ref 1.6–2.6)
MAGNESIUM SERPL-MCNC: 3.1 MG/DL (ref 1.6–2.6)
MAXIMAL PREDICTED HEART RATE: 172 BPM
MCH RBC QN AUTO: 31.3 PG (ref 26.6–33)
MCH RBC QN AUTO: 31.4 PG (ref 26.6–33)
MCH RBC QN AUTO: 31.5 PG (ref 26.6–33)
MCH RBC QN AUTO: 31.6 PG (ref 26.6–33)
MCH RBC QN AUTO: 31.6 PG (ref 26.6–33)
MCH RBC QN AUTO: 31.9 PG (ref 26.6–33)
MCH RBC QN AUTO: 31.9 PG (ref 26.6–33)
MCH RBC QN AUTO: 32.1 PG (ref 26.6–33)
MCH RBC QN AUTO: 32.2 PG (ref 26.6–33)
MCH RBC QN AUTO: 32.3 PG (ref 26.6–33)
MCH RBC QN AUTO: 32.4 PG (ref 26.6–33)
MCH RBC QN AUTO: 32.7 PG (ref 26.6–33)
MCH RBC QN AUTO: 32.8 PG (ref 26.6–33)
MCH RBC QN AUTO: 32.9 PG (ref 26.6–33)
MCH RBC QN AUTO: 33.7 PG (ref 26.6–33)
MCHC RBC AUTO-ENTMCNC: 32.1 G/DL (ref 31.5–35.7)
MCHC RBC AUTO-ENTMCNC: 32.2 G/DL (ref 31.5–35.7)
MCHC RBC AUTO-ENTMCNC: 32.6 G/DL (ref 31.5–35.7)
MCHC RBC AUTO-ENTMCNC: 32.6 G/DL (ref 31.5–35.7)
MCHC RBC AUTO-ENTMCNC: 32.8 G/DL (ref 31.5–35.7)
MCHC RBC AUTO-ENTMCNC: 32.9 G/DL (ref 31.5–35.7)
MCHC RBC AUTO-ENTMCNC: 33.2 G/DL (ref 31.5–35.7)
MCHC RBC AUTO-ENTMCNC: 33.2 G/DL (ref 31.5–35.7)
MCHC RBC AUTO-ENTMCNC: 33.3 G/DL (ref 31.5–35.7)
MCHC RBC AUTO-ENTMCNC: 33.6 G/DL (ref 31.5–35.7)
MCHC RBC AUTO-ENTMCNC: 33.8 G/DL (ref 31.5–35.7)
MCHC RBC AUTO-ENTMCNC: 34 G/DL (ref 31.5–35.7)
MCHC RBC AUTO-ENTMCNC: 34.2 G/DL (ref 31.5–35.7)
MCHC RBC AUTO-ENTMCNC: 34.3 G/DL (ref 31.5–35.7)
MCHC RBC AUTO-ENTMCNC: 35 G/DL (ref 31.5–35.7)
MCV RBC AUTO: 93.2 FL (ref 79–97)
MCV RBC AUTO: 94.2 FL (ref 79–97)
MCV RBC AUTO: 94.5 FL (ref 79–97)
MCV RBC AUTO: 95.6 FL (ref 79–97)
MCV RBC AUTO: 95.8 FL (ref 79–97)
MCV RBC AUTO: 96.2 FL (ref 79–97)
MCV RBC AUTO: 96.4 FL (ref 79–97)
MCV RBC AUTO: 96.4 FL (ref 79–97)
MCV RBC AUTO: 96.7 FL (ref 79–97)
MCV RBC AUTO: 97.4 FL (ref 79–97)
MCV RBC AUTO: 97.7 FL (ref 79–97)
MCV RBC AUTO: 97.9 FL (ref 79–97)
MCV RBC AUTO: 98.2 FL (ref 79–97)
MCV RBC AUTO: 98.9 FL (ref 79–97)
MCV RBC AUTO: 99.1 FL (ref 79–97)
METAMYELOCYTES NFR BLD MANUAL: 1 % (ref 0–0)
METAMYELOCYTES NFR BLD MANUAL: 2 % (ref 0–0)
METANEPH FREE SERPL-MCNC: 105.3 PG/ML (ref 0–88)
MODALITY: ABNORMAL
MONOCYTES # BLD AUTO: 0.22 10*3/MM3 (ref 0.1–0.9)
MONOCYTES # BLD AUTO: 0.9 10*3/MM3 (ref 0.1–0.9)
MONOCYTES # BLD AUTO: 1.15 10*3/MM3 (ref 0.1–0.9)
MONOCYTES # BLD AUTO: 1.19 10*3/MM3 (ref 0.1–0.9)
MONOCYTES # BLD AUTO: 1.6 10*3/MM3 (ref 0.1–0.9)
MONOCYTES # BLD AUTO: 1.6 10*3/MM3 (ref 0.1–0.9)
MONOCYTES # BLD AUTO: 1.69 10*3/MM3 (ref 0.1–0.9)
MONOCYTES # BLD AUTO: 1.7 10*3/MM3 (ref 0.1–0.9)
MONOCYTES # BLD AUTO: 2 10*3/MM3 (ref 0.1–0.9)
MONOCYTES # BLD AUTO: 2.5 10*3/MM3 (ref 0.1–0.9)
MONOCYTES # BLD AUTO: 2.58 10*3/MM3 (ref 0.1–0.9)
MONOCYTES # BLD AUTO: 2.9 10*3/MM3 (ref 0.1–0.9)
MONOCYTES # BLD AUTO: 3.6 10*3/MM3 (ref 0.1–0.9)
MONOCYTES NFR BLD AUTO: 10 % (ref 5–12)
MONOCYTES NFR BLD AUTO: 13.1 % (ref 5–12)
MONOCYTES NFR BLD AUTO: 13.9 % (ref 5–12)
MONOCYTES NFR BLD AUTO: 14.7 % (ref 5–12)
MONOCYTES NFR BLD AUTO: 15.2 % (ref 5–12)
MONOCYTES NFR BLD AUTO: 18.8 % (ref 5–12)
MRSA DNA SPEC QL NAA+PROBE: NORMAL
MUCOUS THREADS URNS QL MICRO: ABNORMAL /HPF
MYELOCYTES NFR BLD MANUAL: 1 % (ref 0–0)
MYELOCYTES NFR BLD MANUAL: 1 % (ref 0–0)
MYELOCYTES NFR BLD MANUAL: 2 % (ref 0–0)
NEUTROPHILS # BLD AUTO: 11.89 10*3/MM3 (ref 1.7–7)
NEUTROPHILS # BLD AUTO: 14.08 10*3/MM3 (ref 1.7–7)
NEUTROPHILS # BLD AUTO: 16 10*3/MM3 (ref 1.7–7)
NEUTROPHILS # BLD AUTO: 18.06 10*3/MM3 (ref 1.7–7)
NEUTROPHILS # BLD AUTO: 18.36 10*3/MM3 (ref 1.7–7)
NEUTROPHILS # BLD AUTO: 20.65 10*3/MM3 (ref 1.7–7)
NEUTROPHILS # BLD AUTO: 20.93 10*3/MM3 (ref 1.7–7)
NEUTROPHILS # BLD AUTO: 26.12 10*3/MM3 (ref 1.7–7)
NEUTROPHILS # BLD AUTO: 27.62 10*3/MM3 (ref 1.7–7)
NEUTROPHILS NFR BLD AUTO: 11.3 10*3/MM3 (ref 1.7–7)
NEUTROPHILS NFR BLD AUTO: 12.5 10*3/MM3 (ref 1.7–7)
NEUTROPHILS NFR BLD AUTO: 12.7 10*3/MM3 (ref 1.7–7)
NEUTROPHILS NFR BLD AUTO: 13.2 10*3/MM3 (ref 1.7–7)
NEUTROPHILS NFR BLD AUTO: 13.4 10*3/MM3 (ref 1.7–7)
NEUTROPHILS NFR BLD AUTO: 14.3 10*3/MM3 (ref 1.7–7)
NEUTROPHILS NFR BLD AUTO: 72.6 % (ref 42.7–76)
NEUTROPHILS NFR BLD AUTO: 74.8 % (ref 42.7–76)
NEUTROPHILS NFR BLD AUTO: 79 % (ref 42.7–76)
NEUTROPHILS NFR BLD AUTO: 79.1 % (ref 42.7–76)
NEUTROPHILS NFR BLD AUTO: 82 % (ref 42.7–76)
NEUTROPHILS NFR BLD AUTO: 82.1 % (ref 42.7–76)
NEUTROPHILS NFR BLD MANUAL: 60 % (ref 42.7–76)
NEUTROPHILS NFR BLD MANUAL: 69 % (ref 42.7–76)
NEUTROPHILS NFR BLD MANUAL: 71 % (ref 42.7–76)
NEUTROPHILS NFR BLD MANUAL: 72 % (ref 42.7–76)
NEUTROPHILS NFR BLD MANUAL: 79 % (ref 42.7–76)
NEUTROPHILS NFR BLD MANUAL: 81 % (ref 42.7–76)
NEUTROPHILS NFR BLD MANUAL: 84 % (ref 42.7–76)
NEUTROPHILS NFR BLD MANUAL: 85 % (ref 42.7–76)
NEUTROPHILS NFR BLD MANUAL: 89 % (ref 42.7–76)
NEUTS BAND NFR BLD MANUAL: 1 % (ref 0–5)
NEUTS BAND NFR BLD MANUAL: 13 % (ref 0–5)
NEUTS BAND NFR BLD MANUAL: 14 % (ref 0–5)
NEUTS BAND NFR BLD MANUAL: 15 % (ref 0–5)
NEUTS BAND NFR BLD MANUAL: 19 % (ref 0–5)
NEUTS BAND NFR BLD MANUAL: 20 % (ref 0–5)
NEUTS BAND NFR BLD MANUAL: 4 % (ref 0–5)
NEUTS BAND NFR BLD MANUAL: 6 % (ref 0–5)
NEUTS BAND NFR BLD MANUAL: 9 % (ref 0–5)
NEUTS VAC BLD QL SMEAR: ABNORMAL
NITRITE UR QL STRIP: NEGATIVE
NITRITE UR QL STRIP: NEGATIVE
NORMETANEPHRINE SERPL-MCNC: 310.9 PG/ML (ref 0–125.8)
NRBC BLD AUTO-RTO: 0 /100 WBC (ref 0–0.2)
NRBC BLD AUTO-RTO: 0.1 /100 WBC (ref 0–0.2)
NRBC BLD AUTO-RTO: 0.3 /100 WBC (ref 0–0.2)
NRBC SPEC MANUAL: 1 /100 WBC (ref 0–0.2)
NT-PROBNP SERPL-MCNC: 1764 PG/ML (ref 0–450)
P JIROVECII DNA # SPEC NAA+PROBE: NEGATIVE {COPIES}/ML
PATH REPORT.FINAL DX SPEC: NORMAL
PATH REPORT.GROSS SPEC: NORMAL
PCO2 BLDA: 30.2 MM HG (ref 35–48)
PCO2 BLDA: 31 MM HG (ref 35–48)
PCO2 BLDA: 31.7 MM HG (ref 35–48)
PCO2 BLDA: 32.5 MM HG (ref 35–48)
PCO2 BLDA: 34.4 MM HG (ref 35–48)
PCO2 BLDA: 34.9 MM HG (ref 35–48)
PCO2 BLDA: 35.2 MM HG (ref 35–48)
PCO2 BLDA: 37.8 MM HG (ref 35–48)
PCO2 BLDA: 38.3 MM HG (ref 35–48)
PCO2 BLDA: 38.6 MM HG (ref 35–48)
PCO2 BLDA: 38.8 MM HG (ref 35–48)
PCO2 BLDA: 40 MM HG (ref 35–48)
PCO2 BLDA: 40.1 MM HG (ref 35–48)
PCO2 BLDA: 40.4 MM HG (ref 35–48)
PCO2 BLDA: 40.5 MM HG (ref 35–48)
PCO2 BLDA: 41.5 MM HG (ref 35–48)
PCO2 BLDA: 42.4 MM HG (ref 35–48)
PCO2 BLDA: 43 MM HG (ref 35–48)
PCO2 BLDA: 43.5 MM HG (ref 35–48)
PCO2 BLDA: 44.4 MM HG (ref 35–48)
PCO2 BLDA: 44.5 MM HG (ref 35–48)
PCO2 BLDA: 45.1 MM HG (ref 35–48)
PCO2 BLDA: 48.3 MM HG (ref 35–48)
PCO2 BLDA: 53.2 MM HG (ref 35–48)
PCO2 BLDA: 67.9 MM HG (ref 35–48)
PCO2 BLDA: 68.2 MM HG (ref 35–48)
PCO2 BLDV: 44.9 MM HG (ref 42–51)
PEEP RESPIRATORY: 10 CM[H2O]
PEEP RESPIRATORY: 12 CM[H2O]
PEEP RESPIRATORY: 12 CM[H2O]
PEEP RESPIRATORY: 13 CM[H2O]
PEEP RESPIRATORY: 15 CM[H2O]
PEEP RESPIRATORY: 17 CM[H2O]
PEEP RESPIRATORY: 5 CM[H2O]
PEEP RESPIRATORY: 9 CM[H2O]
PH BLDA: 7.17 PH UNITS (ref 7.35–7.45)
PH BLDA: 7.2 PH UNITS (ref 7.35–7.45)
PH BLDA: 7.21 PH UNITS (ref 7.35–7.45)
PH BLDA: 7.23 PH UNITS (ref 7.35–7.45)
PH BLDA: 7.28 PH UNITS (ref 7.35–7.45)
PH BLDA: 7.31 PH UNITS (ref 7.35–7.45)
PH BLDA: 7.32 PH UNITS (ref 7.35–7.45)
PH BLDA: 7.34 PH UNITS (ref 7.35–7.45)
PH BLDA: 7.35 PH UNITS (ref 7.35–7.45)
PH BLDA: 7.35 PH UNITS (ref 7.35–7.45)
PH BLDA: 7.36 PH UNITS (ref 7.35–7.45)
PH BLDA: 7.37 PH UNITS (ref 7.35–7.45)
PH BLDA: 7.38 PH UNITS (ref 7.35–7.45)
PH BLDA: 7.39 PH UNITS (ref 7.35–7.45)
PH BLDA: 7.4 PH UNITS (ref 7.35–7.45)
PH BLDA: 7.42 PH UNITS (ref 7.35–7.45)
PH BLDA: 7.43 PH UNITS (ref 7.35–7.45)
PH BLDA: 7.44 PH UNITS (ref 7.35–7.45)
PH BLDA: 7.44 PH UNITS (ref 7.35–7.45)
PH BLDV: 7.31 PH UNITS (ref 7.32–7.43)
PH UR STRIP.AUTO: 5.5 [PH] (ref 5–8)
PH UR STRIP.AUTO: 6.5 [PH] (ref 5–8)
PHOSPHATE SERPL-MCNC: 2.6 MG/DL (ref 2.5–4.5)
PHOSPHATE SERPL-MCNC: 3.3 MG/DL (ref 2.5–4.5)
PHOSPHATE SERPL-MCNC: 3.5 MG/DL (ref 2.5–4.5)
PHOSPHATE SERPL-MCNC: 3.6 MG/DL (ref 2.5–4.5)
PHOSPHATE SERPL-MCNC: 3.9 MG/DL (ref 2.5–4.5)
PHOSPHATE SERPL-MCNC: 4 MG/DL (ref 2.5–4.5)
PHOSPHATE SERPL-MCNC: 4.2 MG/DL (ref 2.5–4.5)
PHOSPHATE SERPL-MCNC: 4.6 MG/DL (ref 2.5–4.5)
PHOSPHATE SERPL-MCNC: 4.8 MG/DL (ref 2.5–4.5)
PHOSPHATE SERPL-MCNC: 5 MG/DL (ref 2.5–4.5)
PHOSPHATE SERPL-MCNC: 5.1 MG/DL (ref 2.5–4.5)
PHOSPHATE SERPL-MCNC: 5.4 MG/DL (ref 2.5–4.5)
PHOSPHATE SERPL-MCNC: 5.5 MG/DL (ref 2.5–4.5)
PHOSPHATE SERPL-MCNC: 6 MG/DL (ref 2.5–4.5)
PHOSPHATE SERPL-MCNC: 6.1 MG/DL (ref 2.5–4.5)
PLAT MORPH BLD: NORMAL
PLATELET # BLD AUTO: 115 10*3/MM3 (ref 140–450)
PLATELET # BLD AUTO: 142 10*3/MM3 (ref 140–450)
PLATELET # BLD AUTO: 143 10*3/MM3 (ref 140–450)
PLATELET # BLD AUTO: 144 10*3/MM3 (ref 140–450)
PLATELET # BLD AUTO: 145 10*3/MM3 (ref 140–450)
PLATELET # BLD AUTO: 147 10*3/MM3 (ref 140–450)
PLATELET # BLD AUTO: 149 10*3/MM3 (ref 140–450)
PLATELET # BLD AUTO: 154 10*3/MM3 (ref 140–450)
PLATELET # BLD AUTO: 155 10*3/MM3 (ref 140–450)
PLATELET # BLD AUTO: 169 10*3/MM3 (ref 140–450)
PLATELET # BLD AUTO: 170 10*3/MM3 (ref 140–450)
PLATELET # BLD AUTO: 173 10*3/MM3 (ref 140–450)
PLATELET # BLD AUTO: 176 10*3/MM3 (ref 140–450)
PMV BLD AUTO: 10 FL (ref 6–12)
PMV BLD AUTO: 10 FL (ref 6–12)
PMV BLD AUTO: 10.2 FL (ref 6–12)
PMV BLD AUTO: 10.2 FL (ref 6–12)
PMV BLD AUTO: 10.5 FL (ref 6–12)
PMV BLD AUTO: 10.5 FL (ref 6–12)
PMV BLD AUTO: 10.8 FL (ref 6–12)
PMV BLD AUTO: 8.8 FL (ref 6–12)
PMV BLD AUTO: 8.9 FL (ref 6–12)
PMV BLD AUTO: 8.9 FL (ref 6–12)
PMV BLD AUTO: 9.1 FL (ref 6–12)
PMV BLD AUTO: 9.2 FL (ref 6–12)
PMV BLD AUTO: 9.2 FL (ref 6–12)
PMV BLD AUTO: 9.4 FL (ref 6–12)
PMV BLD AUTO: 9.6 FL (ref 6–12)
PO2 BLDA: 107.9 MM HG (ref 83–108)
PO2 BLDA: 117.3 MM HG (ref 83–108)
PO2 BLDA: 123.3 MM HG (ref 83–108)
PO2 BLDA: 182.5 MM HG (ref 83–108)
PO2 BLDA: 197.2 MM HG (ref 83–108)
PO2 BLDA: 43.8 MM HG (ref 83–108)
PO2 BLDA: 47.3 MM HG (ref 83–108)
PO2 BLDA: 48.5 MM HG (ref 83–108)
PO2 BLDA: 51.4 MM HG (ref 83–108)
PO2 BLDA: 52.2 MM HG (ref 83–108)
PO2 BLDA: 53.4 MM HG (ref 83–108)
PO2 BLDA: 54.4 MM HG (ref 83–108)
PO2 BLDA: 57.2 MM HG (ref 83–108)
PO2 BLDA: 57.3 MM HG (ref 83–108)
PO2 BLDA: 59.1 MM HG (ref 83–108)
PO2 BLDA: 59.7 MM HG (ref 83–108)
PO2 BLDA: 63.1 MM HG (ref 83–108)
PO2 BLDA: 63.5 MM HG (ref 83–108)
PO2 BLDA: 66.7 MM HG (ref 83–108)
PO2 BLDA: 67.4 MM HG (ref 83–108)
PO2 BLDA: 72.5 MM HG (ref 83–108)
PO2 BLDA: 73.3 MM HG (ref 83–108)
PO2 BLDA: 75.2 MM HG (ref 83–108)
PO2 BLDA: 75.6 MM HG (ref 83–108)
PO2 BLDA: 84.2 MM HG (ref 83–108)
PO2 BLDA: 87.7 MM HG (ref 83–108)
PO2 BLDV: 83.4 MM HG (ref 40–42)
POIKILOCYTOSIS BLD QL SMEAR: ABNORMAL
POIKILOCYTOSIS BLD QL SMEAR: ABNORMAL
POTASSIUM BLDA-SCNC: 3.8 MMOL/L (ref 3.5–4.5)
POTASSIUM SERPL-SCNC: 3.2 MMOL/L (ref 3.5–5.2)
POTASSIUM SERPL-SCNC: 3.4 MMOL/L (ref 3.5–5.2)
POTASSIUM SERPL-SCNC: 3.6 MMOL/L (ref 3.5–5.2)
POTASSIUM SERPL-SCNC: 3.7 MMOL/L (ref 3.5–5.2)
POTASSIUM SERPL-SCNC: 3.8 MMOL/L (ref 3.5–5.2)
POTASSIUM SERPL-SCNC: 4.1 MMOL/L (ref 3.5–5.2)
POTASSIUM SERPL-SCNC: 4.2 MMOL/L (ref 3.5–5.2)
POTASSIUM SERPL-SCNC: 4.2 MMOL/L (ref 3.5–5.2)
POTASSIUM SERPL-SCNC: 4.4 MMOL/L (ref 3.5–5.2)
POTASSIUM SERPL-SCNC: 4.4 MMOL/L (ref 3.5–5.2)
POTASSIUM SERPL-SCNC: 4.5 MMOL/L (ref 3.5–5.2)
POTASSIUM SERPL-SCNC: 4.5 MMOL/L (ref 3.5–5.2)
POTASSIUM SERPL-SCNC: 4.6 MMOL/L (ref 3.5–5.2)
POTASSIUM SERPL-SCNC: 4.7 MMOL/L (ref 3.5–5.2)
POTASSIUM SERPL-SCNC: 4.9 MMOL/L (ref 3.5–5.2)
PREALB SERPL-MCNC: 7.9 MG/DL (ref 20–40)
PROCALCITONIN SERPL-MCNC: 0.66 NG/ML (ref 0–0.25)
PROCALCITONIN SERPL-MCNC: 1.28 NG/ML (ref 0–0.25)
PROT SERPL-MCNC: 5.6 G/DL (ref 6–8.5)
PROT SERPL-MCNC: 5.7 G/DL (ref 6–8.5)
PROT SERPL-MCNC: 5.8 G/DL (ref 6–8.5)
PROT SERPL-MCNC: 5.9 G/DL (ref 6–8.5)
PROT SERPL-MCNC: 5.9 G/DL (ref 6–8.5)
PROT SERPL-MCNC: 6 G/DL (ref 6–8.5)
PROT SERPL-MCNC: 6.1 G/DL (ref 6–8.5)
PROT SERPL-MCNC: 6.3 G/DL (ref 6–8.5)
PROT SERPL-MCNC: 6.4 G/DL (ref 6–8.5)
PROT SERPL-MCNC: 8 G/DL (ref 6–8.5)
PROT UR QL STRIP: ABNORMAL
PROT UR QL STRIP: NEGATIVE
PROTHROMBIN TIME: 11.5 SECONDS (ref 9.6–11.7)
PROTHROMBIN TIME: 12.9 SECONDS (ref 9.6–11.7)
PSV: 17 CMH2O
QT INTERVAL: 401 MS
QT INTERVAL: 466 MS
QT INTERVAL: 472 MS
RBC # BLD AUTO: 3.23 10*6/MM3 (ref 4.14–5.8)
RBC # BLD AUTO: 3.28 10*6/MM3 (ref 4.14–5.8)
RBC # BLD AUTO: 3.36 10*6/MM3 (ref 4.14–5.8)
RBC # BLD AUTO: 3.46 10*6/MM3 (ref 4.14–5.8)
RBC # BLD AUTO: 3.53 10*6/MM3 (ref 4.14–5.8)
RBC # BLD AUTO: 3.59 10*6/MM3 (ref 4.14–5.8)
RBC # BLD AUTO: 3.63 10*6/MM3 (ref 4.14–5.8)
RBC # BLD AUTO: 3.66 10*6/MM3 (ref 4.14–5.8)
RBC # BLD AUTO: 3.72 10*6/MM3 (ref 4.14–5.8)
RBC # BLD AUTO: 3.73 10*6/MM3 (ref 4.14–5.8)
RBC # BLD AUTO: 3.93 10*6/MM3 (ref 4.14–5.8)
RBC # BLD AUTO: 4.16 10*6/MM3 (ref 4.14–5.8)
RBC # BLD AUTO: 4.28 10*6/MM3 (ref 4.14–5.8)
RBC # BLD AUTO: 4.3 10*6/MM3 (ref 4.14–5.8)
RBC # BLD AUTO: 5.56 10*6/MM3 (ref 4.14–5.8)
RBC # UR: ABNORMAL /HPF
RBC # UR: ABNORMAL /HPF
RBC MORPH BLD: NORMAL
REF LAB TEST METHOD: ABNORMAL
REF LAB TEST METHOD: ABNORMAL
RESPIRATORY RATE: 16
RESPIRATORY RATE: 20
RESPIRATORY RATE: 22
RESPIRATORY RATE: 28
RESPIRATORY RATE: 30
RESPIRATORY RATE: 30
RESULT: NORMAL NG/ML
RESULT: NORMAL NG/ML
RHINOVIRUS RNA SPEC NAA+PROBE: NOT DETECTED
RSV RNA NPH QL NAA+NON-PROBE: NOT DETECTED
S PNEUM AG SPEC QL LA: NEGATIVE
SAO2 % BLDCOA: 70.6 % (ref 94–98)
SAO2 % BLDCOA: 74.2 % (ref 94–98)
SAO2 % BLDCOA: 77.3 % (ref 94–98)
SAO2 % BLDCOA: 78 % (ref 94–98)
SAO2 % BLDCOA: 81.1 % (ref 94–98)
SAO2 % BLDCOA: 82.4 % (ref 94–98)
SAO2 % BLDCOA: 85.7 % (ref 94–98)
SAO2 % BLDCOA: 88.4 % (ref 94–98)
SAO2 % BLDCOA: 89.1 % (ref 94–98)
SAO2 % BLDCOA: 89.9 % (ref 94–98)
SAO2 % BLDCOA: 91.1 % (ref 94–98)
SAO2 % BLDCOA: 91.3 % (ref 94–98)
SAO2 % BLDCOA: 91.5 % (ref 94–98)
SAO2 % BLDCOA: 92 % (ref 94–98)
SAO2 % BLDCOA: 93 % (ref 94–98)
SAO2 % BLDCOA: 93.4 % (ref 94–98)
SAO2 % BLDCOA: 93.7 % (ref 94–98)
SAO2 % BLDCOA: 94.9 % (ref 94–98)
SAO2 % BLDCOA: 95.6 % (ref 94–98)
SAO2 % BLDCOA: 95.7 % (ref 94–98)
SAO2 % BLDCOA: 96.2 % (ref 94–98)
SAO2 % BLDCOA: 98.2 % (ref 94–98)
SAO2 % BLDCOA: 98.4 % (ref 94–98)
SAO2 % BLDCOA: 98.9 % (ref 94–98)
SAO2 % BLDCOA: 99.6 % (ref 94–98)
SAO2 % BLDCOA: 99.7 % (ref 94–98)
SAO2 % BLDCOV: 95.1 % (ref 95–99)
SARS-COV-2 RNA NPH QL NAA+NON-PROBE: NOT DETECTED
SARS-COV-2 RNA PNL SPEC NAA+PROBE: NOT DETECTED
SARS-COV-2 RNA RESP QL NAA+PROBE: NOT DETECTED
SCAN SLIDE: NORMAL
SMALL PLATELETS BLD QL SMEAR: ADEQUATE
SODIUM BLD-SCNC: 148 MMOL/L (ref 138–146)
SODIUM SERPL-SCNC: 136 MMOL/L (ref 136–145)
SODIUM SERPL-SCNC: 136 MMOL/L (ref 136–145)
SODIUM SERPL-SCNC: 137 MMOL/L (ref 136–145)
SODIUM SERPL-SCNC: 138 MMOL/L (ref 136–145)
SODIUM SERPL-SCNC: 140 MMOL/L (ref 136–145)
SODIUM SERPL-SCNC: 143 MMOL/L (ref 136–145)
SODIUM SERPL-SCNC: 146 MMOL/L (ref 136–145)
SODIUM SERPL-SCNC: 147 MMOL/L (ref 136–145)
SODIUM SERPL-SCNC: 148 MMOL/L (ref 136–145)
SODIUM SERPL-SCNC: 148 MMOL/L (ref 136–145)
SODIUM SERPL-SCNC: 149 MMOL/L (ref 136–145)
SODIUM SERPL-SCNC: 149 MMOL/L (ref 136–145)
SODIUM SERPL-SCNC: 150 MMOL/L (ref 136–145)
SODIUM SERPL-SCNC: 150 MMOL/L (ref 136–145)
SODIUM SERPL-SCNC: 151 MMOL/L (ref 136–145)
SODIUM SERPL-SCNC: 151 MMOL/L (ref 136–145)
SODIUM SERPL-SCNC: 161 MMOL/L (ref 136–145)
SODIUM SERPL-SCNC: 162 MMOL/L (ref 136–145)
SP GR UR STRIP: 1.01 (ref 1–1.03)
SP GR UR STRIP: 1.03 (ref 1–1.03)
SPECIMEN SOURCE: NORMAL
SQUAMOUS #/AREA URNS HPF: ABNORMAL /HPF
SQUAMOUS #/AREA URNS HPF: ABNORMAL /HPF
STRESS TARGET HR: 146 BPM
TRIGL SERPL-MCNC: 122 MG/DL (ref 0–150)
TRIGL SERPL-MCNC: 157 MG/DL (ref 0–150)
TRIGL SERPL-MCNC: 78 MG/DL (ref 0–150)
TRIGL SERPL-MCNC: 83 MG/DL (ref 0–150)
TROPONIN T SERPL-MCNC: 0.01 NG/ML (ref 0–0.03)
TROPONIN T SERPL-MCNC: 0.2 NG/ML (ref 0–0.03)
UROBILINOGEN UR QL STRIP: ABNORMAL
UROBILINOGEN UR QL STRIP: ABNORMAL
VENTILATOR MODE: ABNORMAL
VLDLC SERPL-MCNC: 15 MG/DL (ref 5–40)
VT ON VENT VENT: 520 ML
VT ON VENT VENT: 550 ML
WBC # BLD AUTO: 14.5 10*3/MM3 (ref 3.4–10.8)
WBC # BLD AUTO: 15.2 10*3/MM3 (ref 3.4–10.8)
WBC # BLD AUTO: 15.6 10*3/MM3 (ref 3.4–10.8)
WBC # BLD AUTO: 16 10*3/MM3 (ref 3.4–10.8)
WBC # BLD AUTO: 16.3 10*3/MM3 (ref 3.4–10.8)
WBC # BLD AUTO: 16.6 10*3/MM3 (ref 3.4–10.8)
WBC # BLD AUTO: 17 10*3/MM3 (ref 3.4–10.8)
WBC # BLD AUTO: 18 10*3/MM3 (ref 3.4–10.8)
WBC # BLD AUTO: 20 10*3/MM3 (ref 3.4–10.8)
WBC # BLD AUTO: 21.1 10*3/MM3 (ref 3.4–10.8)
WBC # BLD AUTO: 21.5 10*3/MM3 (ref 3.4–10.8)
WBC # BLD AUTO: 22.2 10*3/MM3 (ref 3.4–10.8)
WBC # BLD AUTO: 22.5 10*3/MM3 (ref 3.4–10.8)
WBC # BLD AUTO: 28.7 10*3/MM3 (ref 3.4–10.8)
WBC # BLD AUTO: 29.7 10*3/MM3 (ref 3.4–10.8)
WBC MORPH BLD: NORMAL
WBC UR QL AUTO: ABNORMAL /HPF
WBC UR QL AUTO: ABNORMAL /HPF

## 2021-01-01 PROCEDURE — 94799 UNLISTED PULMONARY SVC/PX: CPT

## 2021-01-01 PROCEDURE — C9248 INJ, CLEVIDIPINE BUTYRATE: HCPCS | Performed by: NURSE PRACTITIONER

## 2021-01-01 PROCEDURE — 83735 ASSAY OF MAGNESIUM: CPT | Performed by: NURSE PRACTITIONER

## 2021-01-01 PROCEDURE — 80053 COMPREHEN METABOLIC PANEL: CPT | Performed by: INTERNAL MEDICINE

## 2021-01-01 PROCEDURE — 25010000002 EPOPROSTENOL PER 0.5 MG: Performed by: INTERNAL MEDICINE

## 2021-01-01 PROCEDURE — 82550 ASSAY OF CK (CPK): CPT | Performed by: INTERNAL MEDICINE

## 2021-01-01 PROCEDURE — 25010000002 FENTANYL CITRATE (PF) 2500 MCG/50ML SOLUTION: Performed by: STUDENT IN AN ORGANIZED HEALTH CARE EDUCATION/TRAINING PROGRAM

## 2021-01-01 PROCEDURE — 71045 X-RAY EXAM CHEST 1 VIEW: CPT

## 2021-01-01 PROCEDURE — 4A133B3 MONITORING OF ARTERIAL PRESSURE, PULMONARY, PERCUTANEOUS APPROACH: ICD-10-PCS | Performed by: INTERNAL MEDICINE

## 2021-01-01 PROCEDURE — 85025 COMPLETE CBC W/AUTO DIFF WBC: CPT | Performed by: NURSE PRACTITIONER

## 2021-01-01 PROCEDURE — 25010000002 MIDAZOLAM 50 MG/10ML SOLUTION: Performed by: NURSE PRACTITIONER

## 2021-01-01 PROCEDURE — 93308 TTE F-UP OR LMTD: CPT

## 2021-01-01 PROCEDURE — 84100 ASSAY OF PHOSPHORUS: CPT | Performed by: NURSE PRACTITIONER

## 2021-01-01 PROCEDURE — 25010000002 HEPARIN (PORCINE) PER 1000 UNITS: Performed by: INTERNAL MEDICINE

## 2021-01-01 PROCEDURE — 87116 MYCOBACTERIA CULTURE: CPT | Performed by: INTERNAL MEDICINE

## 2021-01-01 PROCEDURE — 93306 TTE W/DOPPLER COMPLETE: CPT | Performed by: INTERNAL MEDICINE

## 2021-01-01 PROCEDURE — 71275 CT ANGIOGRAPHY CHEST: CPT

## 2021-01-01 PROCEDURE — 85018 HEMOGLOBIN: CPT | Performed by: NURSE PRACTITIONER

## 2021-01-01 PROCEDURE — 94760 N-INVAS EAR/PLS OXIMETRY 1: CPT

## 2021-01-01 PROCEDURE — 25010000002 LORAZEPAM PER 2 MG: Performed by: NURSE PRACTITIONER

## 2021-01-01 PROCEDURE — 63710000001 INSULIN LISPRO (HUMAN) PER 5 UNITS: Performed by: STUDENT IN AN ORGANIZED HEALTH CARE EDUCATION/TRAINING PROGRAM

## 2021-01-01 PROCEDURE — 25010000002 FENTANYL CITRATE (PF) 2500 MCG/50ML SOLUTION: Performed by: INTERNAL MEDICINE

## 2021-01-01 PROCEDURE — 93306 TTE W/DOPPLER COMPLETE: CPT

## 2021-01-01 PROCEDURE — 87102 FUNGUS ISOLATION CULTURE: CPT | Performed by: INTERNAL MEDICINE

## 2021-01-01 PROCEDURE — 87186 SC STD MICRODIL/AGAR DIL: CPT | Performed by: INTERNAL MEDICINE

## 2021-01-01 PROCEDURE — 99232 SBSQ HOSP IP/OBS MODERATE 35: CPT | Performed by: NURSE PRACTITIONER

## 2021-01-01 PROCEDURE — 25010000002 HYDRALAZINE PER 20 MG: Performed by: INTERNAL MEDICINE

## 2021-01-01 PROCEDURE — 82962 GLUCOSE BLOOD TEST: CPT

## 2021-01-01 PROCEDURE — 99255 IP/OBS CONSLTJ NEW/EST HI 80: CPT | Performed by: INTERNAL MEDICINE

## 2021-01-01 PROCEDURE — 63710000001 INSULIN LISPRO (HUMAN) PER 5 UNITS: Performed by: NURSE PRACTITIONER

## 2021-01-01 PROCEDURE — 85007 BL SMEAR W/DIFF WBC COUNT: CPT | Performed by: NURSE PRACTITIONER

## 2021-01-01 PROCEDURE — 84100 ASSAY OF PHOSPHORUS: CPT | Performed by: INTERNAL MEDICINE

## 2021-01-01 PROCEDURE — 84478 ASSAY OF TRIGLYCERIDES: CPT | Performed by: INTERNAL MEDICINE

## 2021-01-01 PROCEDURE — 82330 ASSAY OF CALCIUM: CPT

## 2021-01-01 PROCEDURE — 25010000002 PIPERACILLIN SOD-TAZOBACTAM PER 1 G: Performed by: NURSE PRACTITIONER

## 2021-01-01 PROCEDURE — 99232 SBSQ HOSP IP/OBS MODERATE 35: CPT | Performed by: INTERNAL MEDICINE

## 2021-01-01 PROCEDURE — 99233 SBSQ HOSP IP/OBS HIGH 50: CPT | Performed by: INTERNAL MEDICINE

## 2021-01-01 PROCEDURE — 80069 RENAL FUNCTION PANEL: CPT | Performed by: INTERNAL MEDICINE

## 2021-01-01 PROCEDURE — 25010000002 METHYLPREDNISOLONE PER 40 MG: Performed by: INTERNAL MEDICINE

## 2021-01-01 PROCEDURE — 82803 BLOOD GASES ANY COMBINATION: CPT

## 2021-01-01 PROCEDURE — 63710000001 INSULIN REGULAR HUMAN PER 5 UNITS: Performed by: INTERNAL MEDICINE

## 2021-01-01 PROCEDURE — 84484 ASSAY OF TROPONIN QUANT: CPT | Performed by: NURSE PRACTITIONER

## 2021-01-01 PROCEDURE — 36600 WITHDRAWAL OF ARTERIAL BLOOD: CPT

## 2021-01-01 PROCEDURE — 86140 C-REACTIVE PROTEIN: CPT | Performed by: NURSE PRACTITIONER

## 2021-01-01 PROCEDURE — 0B9J8ZX DRAINAGE OF LEFT LOWER LUNG LOBE, VIA NATURAL OR ARTIFICIAL OPENING ENDOSCOPIC, DIAGNOSTIC: ICD-10-PCS | Performed by: INTERNAL MEDICINE

## 2021-01-01 PROCEDURE — 93005 ELECTROCARDIOGRAM TRACING: CPT | Performed by: INTERNAL MEDICINE

## 2021-01-01 PROCEDURE — 25010000002 PROPOFOL 10 MG/ML EMULSION

## 2021-01-01 PROCEDURE — 25010000002 PROPOFOL 10 MG/ML EMULSION: Performed by: STUDENT IN AN ORGANIZED HEALTH CARE EDUCATION/TRAINING PROGRAM

## 2021-01-01 PROCEDURE — 02HV33Z INSERTION OF INFUSION DEVICE INTO SUPERIOR VENA CAVA, PERCUTANEOUS APPROACH: ICD-10-PCS | Performed by: INTERNAL MEDICINE

## 2021-01-01 PROCEDURE — 99232 SBSQ HOSP IP/OBS MODERATE 35: CPT | Performed by: THORACIC SURGERY (CARDIOTHORACIC VASCULAR SURGERY)

## 2021-01-01 PROCEDURE — 25010000002 METHYLPREDNISOLONE PER 125 MG: Performed by: NURSE PRACTITIONER

## 2021-01-01 PROCEDURE — 99024 POSTOP FOLLOW-UP VISIT: CPT | Performed by: THORACIC SURGERY (CARDIOTHORACIC VASCULAR SURGERY)

## 2021-01-01 PROCEDURE — 25010000002 CLEVIDIPINE BUTYRATE PER 1 MG: Performed by: NURSE PRACTITIONER

## 2021-01-01 PROCEDURE — 82330 ASSAY OF CALCIUM: CPT | Performed by: INTERNAL MEDICINE

## 2021-01-01 PROCEDURE — 93005 ELECTROCARDIOGRAM TRACING: CPT | Performed by: EMERGENCY MEDICINE

## 2021-01-01 PROCEDURE — 25010000002 FENTANYL CITRATE (PF) 50 MCG/ML SOLUTION

## 2021-01-01 PROCEDURE — 87633 RESP VIRUS 12-25 TARGETS: CPT | Performed by: INTERNAL MEDICINE

## 2021-01-01 PROCEDURE — 0B938ZZ DRAINAGE OF RIGHT MAIN BRONCHUS, VIA NATURAL OR ARTIFICIAL OPENING ENDOSCOPIC: ICD-10-PCS | Performed by: INTERNAL MEDICINE

## 2021-01-01 PROCEDURE — 94003 VENT MGMT INPAT SUBQ DAY: CPT

## 2021-01-01 PROCEDURE — 87205 SMEAR GRAM STAIN: CPT | Performed by: INTERNAL MEDICINE

## 2021-01-01 PROCEDURE — 25010000002 LORAZEPAM PER 2 MG: Performed by: INTERNAL MEDICINE

## 2021-01-01 PROCEDURE — 25010000002 LORAZEPAM PER 2 MG: Performed by: STUDENT IN AN ORGANIZED HEALTH CARE EDUCATION/TRAINING PROGRAM

## 2021-01-01 PROCEDURE — 87798 DETECT AGENT NOS DNA AMP: CPT | Performed by: INTERNAL MEDICINE

## 2021-01-01 PROCEDURE — 83880 ASSAY OF NATRIURETIC PEPTIDE: CPT | Performed by: INTERNAL MEDICINE

## 2021-01-01 PROCEDURE — 80053 COMPREHEN METABOLIC PANEL: CPT | Performed by: NURSE PRACTITIONER

## 2021-01-01 PROCEDURE — 0B9F7ZX DRAINAGE OF RIGHT LOWER LUNG LOBE, VIA NATURAL OR ARTIFICIAL OPENING, DIAGNOSTIC: ICD-10-PCS | Performed by: INTERNAL MEDICINE

## 2021-01-01 PROCEDURE — 87385 HISTOPLASMA CAPSUL AG IA: CPT | Performed by: INTERNAL MEDICINE

## 2021-01-01 PROCEDURE — 82330 ASSAY OF CALCIUM: CPT | Performed by: NURSE PRACTITIONER

## 2021-01-01 PROCEDURE — 25010000002 CALCIUM GLUCONATE-NACL 1-0.675 GM/50ML-% SOLUTION: Performed by: INTERNAL MEDICINE

## 2021-01-01 PROCEDURE — 74174 CTA ABD&PLVS W/CONTRAST: CPT

## 2021-01-01 PROCEDURE — 25010000002 METOCLOPRAMIDE PER 10 MG: Performed by: INTERNAL MEDICINE

## 2021-01-01 PROCEDURE — 87305 ASPERGILLUS AG IA: CPT | Performed by: INTERNAL MEDICINE

## 2021-01-01 PROCEDURE — 94640 AIRWAY INHALATION TREATMENT: CPT

## 2021-01-01 PROCEDURE — 84134 ASSAY OF PREALBUMIN: CPT | Performed by: NURSE PRACTITIONER

## 2021-01-01 PROCEDURE — 82728 ASSAY OF FERRITIN: CPT | Performed by: NURSE PRACTITIONER

## 2021-01-01 PROCEDURE — 25010000002 PIPERACILLIN SOD-TAZOBACTAM PER 1 G: Performed by: INTERNAL MEDICINE

## 2021-01-01 PROCEDURE — 87147 CULTURE TYPE IMMUNOLOGIC: CPT | Performed by: INTERNAL MEDICINE

## 2021-01-01 PROCEDURE — 25010000002 CALCIUM GLUCONATE PER 10 ML: Performed by: INTERNAL MEDICINE

## 2021-01-01 PROCEDURE — 84145 PROCALCITONIN (PCT): CPT | Performed by: NURSE PRACTITIONER

## 2021-01-01 PROCEDURE — 99232 SBSQ HOSP IP/OBS MODERATE 35: CPT | Performed by: PSYCHIATRY & NEUROLOGY

## 2021-01-01 PROCEDURE — 82465 ASSAY BLD/SERUM CHOLESTEROL: CPT | Performed by: NURSE PRACTITIONER

## 2021-01-01 PROCEDURE — B548ZZA ULTRASONOGRAPHY OF SUPERIOR VENA CAVA, GUIDANCE: ICD-10-PCS | Performed by: INTERNAL MEDICINE

## 2021-01-01 PROCEDURE — 25010000002 CLEVIDIPINE BUTYRATE PER 1 MG: Performed by: THORACIC SURGERY (CARDIOTHORACIC VASCULAR SURGERY)

## 2021-01-01 PROCEDURE — 74018 RADEX ABDOMEN 1 VIEW: CPT

## 2021-01-01 PROCEDURE — 80048 BASIC METABOLIC PNL TOTAL CA: CPT | Performed by: NURSE PRACTITIONER

## 2021-01-01 PROCEDURE — 85018 HEMOGLOBIN: CPT

## 2021-01-01 PROCEDURE — 25010000002 EPINEPHRINE 1 MG/10ML SOLUTION PREFILLED SYRINGE

## 2021-01-01 PROCEDURE — 87186 SC STD MICRODIL/AGAR DIL: CPT | Performed by: NURSE PRACTITIONER

## 2021-01-01 PROCEDURE — 84484 ASSAY OF TROPONIN QUANT: CPT | Performed by: EMERGENCY MEDICINE

## 2021-01-01 PROCEDURE — 93010 ELECTROCARDIOGRAM REPORT: CPT | Performed by: INTERNAL MEDICINE

## 2021-01-01 PROCEDURE — 87070 CULTURE OTHR SPECIMN AEROBIC: CPT | Performed by: INTERNAL MEDICINE

## 2021-01-01 PROCEDURE — 25010000002 ALBUMIN HUMAN 25% PER 50 ML: Performed by: INTERNAL MEDICINE

## 2021-01-01 PROCEDURE — C1752 CATH,HEMODIALYSIS,SHORT-TERM: HCPCS

## 2021-01-01 PROCEDURE — 87496 CYTOMEG DNA AMP PROBE: CPT | Performed by: INTERNAL MEDICINE

## 2021-01-01 PROCEDURE — 5A1955Z RESPIRATORY VENTILATION, GREATER THAN 96 CONSECUTIVE HOURS: ICD-10-PCS | Performed by: INTERNAL MEDICINE

## 2021-01-01 PROCEDURE — 87070 CULTURE OTHR SPECIMN AEROBIC: CPT | Performed by: NURSE PRACTITIONER

## 2021-01-01 PROCEDURE — 84100 ASSAY OF PHOSPHORUS: CPT | Performed by: STUDENT IN AN ORGANIZED HEALTH CARE EDUCATION/TRAINING PROGRAM

## 2021-01-01 PROCEDURE — 94002 VENT MGMT INPAT INIT DAY: CPT

## 2021-01-01 PROCEDURE — 25010000002 DOPAMINE PER 40 MG: Performed by: THORACIC SURGERY (CARDIOTHORACIC VASCULAR SURGERY)

## 2021-01-01 PROCEDURE — 25010000003 POTASSIUM CHLORIDE 10 MEQ/100ML SOLUTION: Performed by: INTERNAL MEDICINE

## 2021-01-01 PROCEDURE — 83605 ASSAY OF LACTIC ACID: CPT | Performed by: INTERNAL MEDICINE

## 2021-01-01 PROCEDURE — 25010000002 THIAMINE PER 100 MG: Performed by: INTERNAL MEDICINE

## 2021-01-01 PROCEDURE — 25010000002 IRON SUCROSE PER 1 MG: Performed by: INTERNAL MEDICINE

## 2021-01-01 PROCEDURE — 25010000002 FENTANYL CITRATE (PF) 50 MCG/ML SOLUTION: Performed by: NURSE PRACTITIONER

## 2021-01-01 PROCEDURE — 70360 X-RAY EXAM OF NECK: CPT

## 2021-01-01 PROCEDURE — 83835 ASSAY OF METANEPHRINES: CPT | Performed by: INTERNAL MEDICINE

## 2021-01-01 PROCEDURE — 85014 HEMATOCRIT: CPT | Performed by: NURSE PRACTITIONER

## 2021-01-01 PROCEDURE — 85379 FIBRIN DEGRADATION QUANT: CPT | Performed by: NURSE PRACTITIONER

## 2021-01-01 PROCEDURE — C9248 INJ, CLEVIDIPINE BUTYRATE: HCPCS | Performed by: THORACIC SURGERY (CARDIOTHORACIC VASCULAR SURGERY)

## 2021-01-01 PROCEDURE — 63710000001 INSULIN LISPRO (HUMAN) PER 5 UNITS: Performed by: INTERNAL MEDICINE

## 2021-01-01 PROCEDURE — 25010000002 FUROSEMIDE PER 20 MG: Performed by: INTERNAL MEDICINE

## 2021-01-01 PROCEDURE — 87206 SMEAR FLUORESCENT/ACID STAI: CPT | Performed by: INTERNAL MEDICINE

## 2021-01-01 PROCEDURE — 25010000002 CEFTRIAXONE PER 250 MG: Performed by: INTERNAL MEDICINE

## 2021-01-01 PROCEDURE — 25010000002 VANCOMYCIN 10 G RECONSTITUTED SOLUTION: Performed by: INTERNAL MEDICINE

## 2021-01-01 PROCEDURE — 87641 MR-STAPH DNA AMP PROBE: CPT | Performed by: NURSE PRACTITIONER

## 2021-01-01 PROCEDURE — 87040 BLOOD CULTURE FOR BACTERIA: CPT | Performed by: INTERNAL MEDICINE

## 2021-01-01 PROCEDURE — 99254 IP/OBS CNSLTJ NEW/EST MOD 60: CPT | Performed by: THORACIC SURGERY (CARDIOTHORACIC VASCULAR SURGERY)

## 2021-01-01 PROCEDURE — 25010000002 SUCCINYLCHOLINE PER 20 MG: Performed by: EMERGENCY MEDICINE

## 2021-01-01 PROCEDURE — 85025 COMPLETE CBC W/AUTO DIFF WBC: CPT | Performed by: EMERGENCY MEDICINE

## 2021-01-01 PROCEDURE — 25010000002 MIDAZOLAM PER 1 MG

## 2021-01-01 PROCEDURE — 25010000002 MAGNESIUM SULFATE IN D5W 1G/100ML (PREMIX) 1-5 GM/100ML-% SOLUTION: Performed by: INTERNAL MEDICINE

## 2021-01-01 PROCEDURE — 85730 THROMBOPLASTIN TIME PARTIAL: CPT | Performed by: EMERGENCY MEDICINE

## 2021-01-01 PROCEDURE — 25010000002 EPINEPHRINE 1 MG/10ML SOLUTION PREFILLED SYRINGE: Performed by: INTERNAL MEDICINE

## 2021-01-01 PROCEDURE — 81001 URINALYSIS AUTO W/SCOPE: CPT | Performed by: EMERGENCY MEDICINE

## 2021-01-01 PROCEDURE — 0 IOPAMIDOL PER 1 ML: Performed by: EMERGENCY MEDICINE

## 2021-01-01 PROCEDURE — 83036 HEMOGLOBIN GLYCOSYLATED A1C: CPT | Performed by: STUDENT IN AN ORGANIZED HEALTH CARE EDUCATION/TRAINING PROGRAM

## 2021-01-01 PROCEDURE — 25010000002 THIAMINE PER 100 MG: Performed by: NURSE PRACTITIONER

## 2021-01-01 PROCEDURE — 80051 ELECTROLYTE PANEL: CPT

## 2021-01-01 PROCEDURE — 25010000002 METHYLNALTREXONE 12 MG/0.6ML SOLUTION: Performed by: NURSE PRACTITIONER

## 2021-01-01 PROCEDURE — G0432 EIA HIV-1/HIV-2 SCREEN: HCPCS | Performed by: INTERNAL MEDICINE

## 2021-01-01 PROCEDURE — 83605 ASSAY OF LACTIC ACID: CPT | Performed by: NURSE PRACTITIONER

## 2021-01-01 PROCEDURE — 0 IOPAMIDOL PER 1 ML: Performed by: INTERNAL MEDICINE

## 2021-01-01 PROCEDURE — 99222 1ST HOSP IP/OBS MODERATE 55: CPT | Performed by: PSYCHIATRY & NEUROLOGY

## 2021-01-01 PROCEDURE — P9047 ALBUMIN (HUMAN), 25%, 50ML: HCPCS | Performed by: INTERNAL MEDICINE

## 2021-01-01 PROCEDURE — 25010000002 VANCOMYCIN PER 500 MG: Performed by: INTERNAL MEDICINE

## 2021-01-01 PROCEDURE — 0BH17EZ INSERTION OF ENDOTRACHEAL AIRWAY INTO TRACHEA, VIA NATURAL OR ARTIFICIAL OPENING: ICD-10-PCS | Performed by: EMERGENCY MEDICINE

## 2021-01-01 PROCEDURE — 25010000002 MIDAZOLAM PER 1 MG: Performed by: NURSE PRACTITIONER

## 2021-01-01 PROCEDURE — 85610 PROTHROMBIN TIME: CPT | Performed by: INTERNAL MEDICINE

## 2021-01-01 PROCEDURE — 25010000002 FUROSEMIDE PER 20 MG: Performed by: NURSE PRACTITIONER

## 2021-01-01 PROCEDURE — 25010000002 EPOPROSTENOL PER 0.5 MG: Performed by: NURSE PRACTITIONER

## 2021-01-01 PROCEDURE — 25010000003 ATROPINE SULFATE

## 2021-01-01 PROCEDURE — 83615 LACTATE (LD) (LDH) ENZYME: CPT | Performed by: NURSE PRACTITIONER

## 2021-01-01 PROCEDURE — 93005 ELECTROCARDIOGRAM TRACING: CPT

## 2021-01-01 PROCEDURE — 99233 SBSQ HOSP IP/OBS HIGH 50: CPT | Performed by: THORACIC SURGERY (CARDIOTHORACIC VASCULAR SURGERY)

## 2021-01-01 PROCEDURE — 25010000002 MORPHINE PER 10 MG

## 2021-01-01 PROCEDURE — 87899 AGENT NOS ASSAY W/OPTIC: CPT | Performed by: NURSE PRACTITIONER

## 2021-01-01 PROCEDURE — 25010000002 CHLOROTHIAZIDE PER 500 MG: Performed by: INTERNAL MEDICINE

## 2021-01-01 PROCEDURE — 87205 SMEAR GRAM STAIN: CPT | Performed by: NURSE PRACTITIONER

## 2021-01-01 PROCEDURE — 82088 ASSAY OF ALDOSTERONE: CPT | Performed by: INTERNAL MEDICINE

## 2021-01-01 PROCEDURE — U0003 INFECTIOUS AGENT DETECTION BY NUCLEIC ACID (DNA OR RNA); SEVERE ACUTE RESPIRATORY SYNDROME CORONAVIRUS 2 (SARS-COV-2) (CORONAVIRUS DISEASE [COVID-19]), AMPLIFIED PROBE TECHNIQUE, MAKING USE OF HIGH THROUGHPUT TECHNOLOGIES AS DESCRIBED BY CMS-2020-01-R: HCPCS | Performed by: INTERNAL MEDICINE

## 2021-01-01 PROCEDURE — 25010000002 MORPHINE PER 10 MG: Performed by: STUDENT IN AN ORGANIZED HEALTH CARE EDUCATION/TRAINING PROGRAM

## 2021-01-01 PROCEDURE — 0B978ZZ DRAINAGE OF LEFT MAIN BRONCHUS, VIA NATURAL OR ARTIFICIAL OPENING ENDOSCOPIC: ICD-10-PCS | Performed by: INTERNAL MEDICINE

## 2021-01-01 PROCEDURE — 80061 LIPID PANEL: CPT | Performed by: NURSE PRACTITIONER

## 2021-01-01 PROCEDURE — 87147 CULTURE TYPE IMMUNOLOGIC: CPT | Performed by: NURSE PRACTITIONER

## 2021-01-01 PROCEDURE — U0003 INFECTIOUS AGENT DETECTION BY NUCLEIC ACID (DNA OR RNA); SEVERE ACUTE RESPIRATORY SYNDROME CORONAVIRUS 2 (SARS-COV-2) (CORONAVIRUS DISEASE [COVID-19]), AMPLIFIED PROBE TECHNIQUE, MAKING USE OF HIGH THROUGHPUT TECHNOLOGIES AS DESCRIBED BY CMS-2020-01-R: HCPCS | Performed by: EMERGENCY MEDICINE

## 2021-01-01 PROCEDURE — 99497 ADVNCD CARE PLAN 30 MIN: CPT | Performed by: NURSE PRACTITIONER

## 2021-01-01 PROCEDURE — 99285 EMERGENCY DEPT VISIT HI MDM: CPT

## 2021-01-01 PROCEDURE — 99253 IP/OBS CNSLTJ NEW/EST LOW 45: CPT | Performed by: NURSE PRACTITIONER

## 2021-01-01 PROCEDURE — 0202U NFCT DS 22 TRGT SARS-COV-2: CPT | Performed by: NURSE PRACTITIONER

## 2021-01-01 PROCEDURE — 70450 CT HEAD/BRAIN W/O DYE: CPT

## 2021-01-01 PROCEDURE — C1751 CATH, INF, PER/CENT/MIDLINE: HCPCS

## 2021-01-01 PROCEDURE — 25010000002 ALTEPLASE 2 MG RECONSTITUTED SOLUTION 1 EACH VIAL: Performed by: INTERNAL MEDICINE

## 2021-01-01 PROCEDURE — 99232 SBSQ HOSP IP/OBS MODERATE 35: CPT | Performed by: PHYSICIAN ASSISTANT

## 2021-01-01 PROCEDURE — 25010000003 POTASSIUM CHLORIDE 10 MEQ/100ML SOLUTION: Performed by: NURSE PRACTITIONER

## 2021-01-01 PROCEDURE — 87071 CULTURE AEROBIC QUANT OTHER: CPT | Performed by: INTERNAL MEDICINE

## 2021-01-01 PROCEDURE — 93308 TTE F-UP OR LMTD: CPT | Performed by: INTERNAL MEDICINE

## 2021-01-01 PROCEDURE — 85610 PROTHROMBIN TIME: CPT | Performed by: EMERGENCY MEDICINE

## 2021-01-01 PROCEDURE — 87040 BLOOD CULTURE FOR BACTERIA: CPT | Performed by: NURSE PRACTITIONER

## 2021-01-01 PROCEDURE — 88108 CYTOPATH CONCENTRATE TECH: CPT | Performed by: INTERNAL MEDICINE

## 2021-01-01 PROCEDURE — 82140 ASSAY OF AMMONIA: CPT | Performed by: NURSE PRACTITIONER

## 2021-01-01 PROCEDURE — 25010000002 FUROSEMIDE PER 20 MG

## 2021-01-01 PROCEDURE — 25010000002 ALTEPLASE 2 MG RECONSTITUTED SOLUTION 1 EACH VIAL: Performed by: NURSE PRACTITIONER

## 2021-01-01 PROCEDURE — 81001 URINALYSIS AUTO W/SCOPE: CPT | Performed by: NURSE PRACTITIONER

## 2021-01-01 PROCEDURE — 80053 COMPREHEN METABOLIC PANEL: CPT | Performed by: EMERGENCY MEDICINE

## 2021-01-01 PROCEDURE — 82140 ASSAY OF AMMONIA: CPT | Performed by: STUDENT IN AN ORGANIZED HEALTH CARE EDUCATION/TRAINING PROGRAM

## 2021-01-01 PROCEDURE — 99253 IP/OBS CNSLTJ NEW/EST LOW 45: CPT | Performed by: SURGERY

## 2021-01-01 PROCEDURE — 25010000002 PROPOFOL 10 MG/ML EMULSION: Performed by: ANESTHESIOLOGY

## 2021-01-01 PROCEDURE — 80076 HEPATIC FUNCTION PANEL: CPT | Performed by: SURGERY

## 2021-01-01 PROCEDURE — 76705 ECHO EXAM OF ABDOMEN: CPT

## 2021-01-01 PROCEDURE — 84478 ASSAY OF TRIGLYCERIDES: CPT | Performed by: NURSE PRACTITIONER

## 2021-01-01 PROCEDURE — 87340 HEPATITIS B SURFACE AG IA: CPT | Performed by: INTERNAL MEDICINE

## 2021-01-01 RX ORDER — NEBIVOLOL 5 MG/1
10 TABLET ORAL
Status: DISCONTINUED | OUTPATIENT
Start: 2021-01-01 | End: 2021-01-01

## 2021-01-01 RX ORDER — LORAZEPAM 2 MG/ML
2 INJECTION INTRAMUSCULAR
Status: DISCONTINUED | OUTPATIENT
Start: 2021-01-01 | End: 2021-01-01

## 2021-01-01 RX ORDER — CARVEDILOL 6.25 MG/1
12.5 TABLET ORAL 2 TIMES DAILY WITH MEALS
Status: DISCONTINUED | OUTPATIENT
Start: 2021-01-01 | End: 2021-01-01

## 2021-01-01 RX ORDER — AMLODIPINE BESYLATE 5 MG/1
10 TABLET ORAL
Status: DISCONTINUED | OUTPATIENT
Start: 2021-01-01 | End: 2021-01-01 | Stop reason: SDUPTHER

## 2021-01-01 RX ORDER — DEXTROSE MONOHYDRATE 25 G/50ML
25-50 INJECTION, SOLUTION INTRAVENOUS
Status: DISCONTINUED | OUTPATIENT
Start: 2021-01-01 | End: 2021-09-05 | Stop reason: HOSPADM

## 2021-01-01 RX ORDER — NITROGLYCERIN 20 MG/100ML
5-200 INJECTION INTRAVENOUS
Status: DISCONTINUED | OUTPATIENT
Start: 2021-01-01 | End: 2021-01-01

## 2021-01-01 RX ORDER — LIDOCAINE HYDROCHLORIDE 20 MG/ML
JELLY TOPICAL
Status: COMPLETED
Start: 2021-01-01 | End: 2021-01-01

## 2021-01-01 RX ORDER — HEPARIN SODIUM 1000 [USP'U]/ML
3000 INJECTION, SOLUTION INTRAVENOUS; SUBCUTANEOUS ONCE
Status: COMPLETED | OUTPATIENT
Start: 2021-01-01 | End: 2021-01-01

## 2021-01-01 RX ORDER — ESMOLOL HYDROCHLORIDE 10 MG/ML
50-300 INJECTION, SOLUTION INTRAVENOUS
Status: DISCONTINUED | OUTPATIENT
Start: 2021-01-01 | End: 2021-01-01

## 2021-01-01 RX ORDER — INSULIN LISPRO 100 [IU]/ML
0-24 INJECTION, SOLUTION INTRAVENOUS; SUBCUTANEOUS AS NEEDED
Status: DISCONTINUED | OUTPATIENT
Start: 2021-01-01 | End: 2021-01-01

## 2021-01-01 RX ORDER — FENTANYL CITRATE-0.9 % NACL/PF 10 MCG/ML
50-300 PLASTIC BAG, INJECTION (ML) INTRAVENOUS
Status: DISCONTINUED | OUTPATIENT
Start: 2021-01-01 | End: 2021-01-01

## 2021-01-01 RX ORDER — MIDAZOLAM HYDROCHLORIDE 1 MG/ML
2 INJECTION INTRAMUSCULAR; INTRAVENOUS ONCE
Status: COMPLETED | OUTPATIENT
Start: 2021-01-01 | End: 2021-01-01

## 2021-01-01 RX ORDER — MIDAZOLAM HYDROCHLORIDE 1 MG/ML
2 INJECTION INTRAMUSCULAR; INTRAVENOUS ONCE
Status: DISCONTINUED | OUTPATIENT
Start: 2021-01-01 | End: 2021-01-01

## 2021-01-01 RX ORDER — NITROGLYCERIN 0.4 MG/1
0.4 TABLET SUBLINGUAL
Status: COMPLETED | OUTPATIENT
Start: 2021-01-01 | End: 2021-01-01

## 2021-01-01 RX ORDER — FENTANYL CITRATE 50 UG/ML
50 INJECTION, SOLUTION INTRAMUSCULAR; INTRAVENOUS ONCE
Status: COMPLETED | OUTPATIENT
Start: 2021-01-01 | End: 2021-01-01

## 2021-01-01 RX ORDER — LORAZEPAM 1 MG/1
1 TABLET ORAL
Status: DISCONTINUED | OUTPATIENT
Start: 2021-01-01 | End: 2021-01-01

## 2021-01-01 RX ORDER — WATER 1000 ML/1000ML
INJECTION, SOLUTION INTRAVENOUS
Status: COMPLETED
Start: 2021-01-01 | End: 2021-01-01

## 2021-01-01 RX ORDER — EPINEPHRINE 1 MG/ML
INJECTION, SOLUTION, CONCENTRATE INTRAVENOUS
Status: DISCONTINUED
Start: 2021-01-01 | End: 2021-09-05 | Stop reason: HOSPADM

## 2021-01-01 RX ORDER — DEXMEDETOMIDINE HYDROCHLORIDE 4 UG/ML
.2-1.5 INJECTION, SOLUTION INTRAVENOUS
Status: DISCONTINUED | OUTPATIENT
Start: 2021-01-01 | End: 2021-01-01

## 2021-01-01 RX ORDER — MAGNESIUM SULFATE 1 G/100ML
1 INJECTION INTRAVENOUS EVERY 12 HOURS
Status: COMPLETED | OUTPATIENT
Start: 2021-01-01 | End: 2021-01-01

## 2021-01-01 RX ORDER — BUMETANIDE 0.25 MG/ML
1 INJECTION INTRAMUSCULAR; INTRAVENOUS DAILY
Status: DISCONTINUED | OUTPATIENT
Start: 2021-01-01 | End: 2021-01-01

## 2021-01-01 RX ORDER — DEXTROSE MONOHYDRATE 25 G/50ML
25 INJECTION, SOLUTION INTRAVENOUS
Status: DISCONTINUED | OUTPATIENT
Start: 2021-01-01 | End: 2021-09-05 | Stop reason: HOSPADM

## 2021-01-01 RX ORDER — ALBUMIN (HUMAN) 12.5 G/50ML
12.5 SOLUTION INTRAVENOUS AS NEEDED
Status: DISCONTINUED | OUTPATIENT
Start: 2021-01-01 | End: 2021-01-01 | Stop reason: HOSPADM

## 2021-01-01 RX ORDER — HYDRALAZINE HYDROCHLORIDE 25 MG/1
100 TABLET, FILM COATED ORAL 3 TIMES DAILY
Status: DISCONTINUED | OUTPATIENT
Start: 2021-01-01 | End: 2021-01-01

## 2021-01-01 RX ORDER — BUMETANIDE 0.25 MG/ML
1 INJECTION INTRAMUSCULAR; INTRAVENOUS EVERY 8 HOURS
Status: DISCONTINUED | OUTPATIENT
Start: 2021-01-01 | End: 2021-01-01

## 2021-01-01 RX ORDER — SUCCINYLCHOLINE CHLORIDE 20 MG/ML
100 INJECTION INTRAMUSCULAR; INTRAVENOUS ONCE
Status: COMPLETED | OUTPATIENT
Start: 2021-01-01 | End: 2021-01-01

## 2021-01-01 RX ORDER — AZITHROMYCIN 250 MG/1
250 TABLET, FILM COATED ORAL DAILY
Status: COMPLETED | OUTPATIENT
Start: 2021-01-01 | End: 2021-01-01

## 2021-01-01 RX ORDER — EPINEPHRINE 0.1 MG/ML
SYRINGE (ML) INJECTION AS NEEDED
Status: DISCONTINUED | OUTPATIENT
Start: 2021-01-01 | End: 2021-09-05 | Stop reason: HOSPADM

## 2021-01-01 RX ORDER — HYDRALAZINE HYDROCHLORIDE 20 MG/ML
20 INJECTION INTRAMUSCULAR; INTRAVENOUS EVERY 4 HOURS
Status: DISCONTINUED | OUTPATIENT
Start: 2021-01-01 | End: 2021-01-01

## 2021-01-01 RX ORDER — BUMETANIDE 0.25 MG/ML
1 INJECTION INTRAMUSCULAR; INTRAVENOUS EVERY 12 HOURS
Status: DISCONTINUED | OUTPATIENT
Start: 2021-01-01 | End: 2021-01-01

## 2021-01-01 RX ORDER — MIDAZOLAM HYDROCHLORIDE 1 MG/ML
1 INJECTION, SOLUTION INTRAVENOUS
Status: DISCONTINUED | OUTPATIENT
Start: 2021-01-01 | End: 2021-01-01

## 2021-01-01 RX ORDER — FENTANYL CITRATE 50 UG/ML
INJECTION, SOLUTION INTRAMUSCULAR; INTRAVENOUS
Status: COMPLETED
Start: 2021-01-01 | End: 2021-01-01

## 2021-01-01 RX ORDER — LORAZEPAM 1 MG/1
2 TABLET ORAL
Status: DISCONTINUED | OUTPATIENT
Start: 2021-01-01 | End: 2021-01-01

## 2021-01-01 RX ORDER — BUDESONIDE 0.5 MG/2ML
0.5 INHALANT ORAL
Status: DISCONTINUED | OUTPATIENT
Start: 2021-01-01 | End: 2021-09-05 | Stop reason: HOSPADM

## 2021-01-01 RX ORDER — LIDOCAINE HYDROCHLORIDE 20 MG/ML
INJECTION, SOLUTION EPIDURAL; INFILTRATION; INTRACAUDAL; PERINEURAL
Status: DISPENSED
Start: 2021-01-01 | End: 2021-01-01

## 2021-01-01 RX ORDER — PROPOFOL 10 MG/ML
VIAL (ML) INTRAVENOUS
Status: COMPLETED
Start: 2021-01-01 | End: 2021-01-01

## 2021-01-01 RX ORDER — LABETALOL HYDROCHLORIDE 5 MG/ML
10 INJECTION, SOLUTION INTRAVENOUS
Status: COMPLETED | OUTPATIENT
Start: 2021-01-01 | End: 2021-01-01

## 2021-01-01 RX ORDER — ONDANSETRON 4 MG/1
4 TABLET, FILM COATED ORAL EVERY 6 HOURS PRN
Status: DISCONTINUED | OUTPATIENT
Start: 2021-01-01 | End: 2021-09-05 | Stop reason: HOSPADM

## 2021-01-01 RX ORDER — EPOPROSTENOL SODIUM 1.5 MG/1
50 INJECTION, POWDER, FOR SOLUTION INTRAVENOUS CONTINUOUS
Status: DISCONTINUED | OUTPATIENT
Start: 2021-01-01 | End: 2021-01-01

## 2021-01-01 RX ORDER — CARVEDILOL 3.12 MG/1
3.12 TABLET ORAL 2 TIMES DAILY WITH MEALS
Status: DISCONTINUED | OUTPATIENT
Start: 2021-01-01 | End: 2021-01-01

## 2021-01-01 RX ORDER — LIDOCAINE HYDROCHLORIDE 20 MG/ML
JELLY TOPICAL AS NEEDED
Status: DISCONTINUED | OUTPATIENT
Start: 2021-01-01 | End: 2021-01-01 | Stop reason: HOSPADM

## 2021-01-01 RX ORDER — LIDOCAINE 50 MG/G
OINTMENT TOPICAL
Status: COMPLETED
Start: 2021-01-01 | End: 2021-01-01

## 2021-01-01 RX ORDER — INSULIN LISPRO 100 [IU]/ML
0-24 INJECTION, SOLUTION INTRAVENOUS; SUBCUTANEOUS EVERY 6 HOURS SCHEDULED
Status: DISCONTINUED | OUTPATIENT
Start: 2021-01-01 | End: 2021-01-01

## 2021-01-01 RX ORDER — BUDESONIDE 0.5 MG/2ML
0.5 INHALANT ORAL
Status: DISCONTINUED | OUTPATIENT
Start: 2021-01-01 | End: 2021-01-01 | Stop reason: SDUPTHER

## 2021-01-01 RX ORDER — METOCLOPRAMIDE HYDROCHLORIDE 5 MG/ML
5 INJECTION INTRAMUSCULAR; INTRAVENOUS EVERY 8 HOURS SCHEDULED
Status: DISCONTINUED | OUTPATIENT
Start: 2021-01-01 | End: 2021-09-05 | Stop reason: HOSPADM

## 2021-01-01 RX ORDER — MORPHINE SULFATE 4 MG/ML
2 INJECTION, SOLUTION INTRAMUSCULAR; INTRAVENOUS ONCE
Status: COMPLETED | OUTPATIENT
Start: 2021-01-01 | End: 2021-01-01

## 2021-01-01 RX ORDER — POLYETHYLENE GLYCOL 3350 17 G/17G
17 POWDER, FOR SOLUTION ORAL DAILY
Status: DISCONTINUED | OUTPATIENT
Start: 2021-01-01 | End: 2021-09-05 | Stop reason: HOSPADM

## 2021-01-01 RX ORDER — CARBOXYMETHYLCELLULOSE SODIUM 10 MG/ML
1 GEL OPHTHALMIC 3 TIMES DAILY PRN
Status: DISCONTINUED | OUTPATIENT
Start: 2021-01-01 | End: 2021-09-05 | Stop reason: HOSPADM

## 2021-01-01 RX ORDER — VECURONIUM BROMIDE 1 MG/ML
10 INJECTION, POWDER, LYOPHILIZED, FOR SOLUTION INTRAVENOUS ONCE
Status: COMPLETED | OUTPATIENT
Start: 2021-01-01 | End: 2021-01-01

## 2021-01-01 RX ORDER — ETOMIDATE 2 MG/ML
INJECTION INTRAVENOUS
Status: DISCONTINUED
Start: 2021-01-01 | End: 2021-01-01 | Stop reason: WASHOUT

## 2021-01-01 RX ORDER — MIDAZOLAM HYDROCHLORIDE 1 MG/ML
INJECTION INTRAMUSCULAR; INTRAVENOUS
Status: COMPLETED
Start: 2021-01-01 | End: 2021-01-01

## 2021-01-01 RX ORDER — BUMETANIDE 0.25 MG/ML
2 INJECTION INTRAMUSCULAR; INTRAVENOUS ONCE
Status: COMPLETED | OUTPATIENT
Start: 2021-01-01 | End: 2021-01-01

## 2021-01-01 RX ORDER — ACETAMINOPHEN 325 MG/1
650 TABLET ORAL EVERY 6 HOURS PRN
Status: DISCONTINUED | OUTPATIENT
Start: 2021-01-01 | End: 2021-09-05 | Stop reason: HOSPADM

## 2021-01-01 RX ORDER — MORPHINE SULFATE 4 MG/ML
INJECTION, SOLUTION INTRAMUSCULAR; INTRAVENOUS
Status: COMPLETED
Start: 2021-01-01 | End: 2021-01-01

## 2021-01-01 RX ORDER — FOLIC ACID 1 MG/1
1 TABLET ORAL DAILY
Status: DISCONTINUED | OUTPATIENT
Start: 2021-01-01 | End: 2021-09-05 | Stop reason: HOSPADM

## 2021-01-01 RX ORDER — SODIUM CHLORIDE FOR INHALATION 0.9 %
3 VIAL, NEBULIZER (ML) INHALATION
Status: DISCONTINUED | OUTPATIENT
Start: 2021-01-01 | End: 2021-01-01

## 2021-01-01 RX ORDER — MIDAZOLAM HYDROCHLORIDE 1 MG/ML
1-10 INJECTION, SOLUTION INTRAVENOUS
Status: DISCONTINUED | OUTPATIENT
Start: 2021-01-01 | End: 2021-01-01

## 2021-01-01 RX ORDER — ASPIRIN 81 MG/1
324 TABLET, CHEWABLE ORAL ONCE
Status: COMPLETED | OUTPATIENT
Start: 2021-01-01 | End: 2021-01-01

## 2021-01-01 RX ORDER — BUMETANIDE 0.25 MG/ML
2 INJECTION INTRAMUSCULAR; INTRAVENOUS EVERY 12 HOURS
Status: DISCONTINUED | OUTPATIENT
Start: 2021-01-01 | End: 2021-01-01

## 2021-01-01 RX ORDER — METOPROLOL TARTRATE 5 MG/5ML
2.5 INJECTION INTRAVENOUS 2 TIMES DAILY
Status: DISCONTINUED | OUTPATIENT
Start: 2021-01-01 | End: 2021-01-01

## 2021-01-01 RX ORDER — FUROSEMIDE 10 MG/ML
20 INJECTION INTRAMUSCULAR; INTRAVENOUS ONCE
Status: COMPLETED | OUTPATIENT
Start: 2021-01-01 | End: 2021-01-01

## 2021-01-01 RX ORDER — FAMOTIDINE 10 MG/ML
20 INJECTION, SOLUTION INTRAVENOUS DAILY
Status: DISCONTINUED | OUTPATIENT
Start: 2021-01-01 | End: 2021-01-01

## 2021-01-01 RX ORDER — CLONIDINE 0.3 MG/24H
1 PATCH, EXTENDED RELEASE TRANSDERMAL WEEKLY
Status: DISCONTINUED | OUTPATIENT
Start: 2021-01-01 | End: 2021-09-05 | Stop reason: HOSPADM

## 2021-01-01 RX ORDER — VECURONIUM BROMIDE 1 MG/ML
10 INJECTION, POWDER, LYOPHILIZED, FOR SOLUTION INTRAVENOUS
Status: DISCONTINUED | OUTPATIENT
Start: 2021-01-01 | End: 2021-01-01

## 2021-01-01 RX ORDER — THIAMINE HYDROCHLORIDE 100 MG/ML
100 INJECTION, SOLUTION INTRAMUSCULAR; INTRAVENOUS DAILY
Status: DISCONTINUED | OUTPATIENT
Start: 2021-01-01 | End: 2021-01-01

## 2021-01-01 RX ORDER — CLONIDINE 0.3 MG/24H
1 PATCH, EXTENDED RELEASE TRANSDERMAL WEEKLY
Status: DISCONTINUED | OUTPATIENT
Start: 2021-01-01 | End: 2021-01-01

## 2021-01-01 RX ORDER — DOPAMINE HYDROCHLORIDE 160 MG/100ML
2-20 INJECTION, SOLUTION INTRAVENOUS
Status: DISCONTINUED | OUTPATIENT
Start: 2021-01-01 | End: 2021-01-01

## 2021-01-01 RX ORDER — BUMETANIDE 0.25 MG/ML
INJECTION INTRAMUSCULAR; INTRAVENOUS
Status: COMPLETED
Start: 2021-01-01 | End: 2021-01-01

## 2021-01-01 RX ORDER — LORAZEPAM 2 MG/ML
2 INJECTION INTRAMUSCULAR
Status: DISCONTINUED | OUTPATIENT
Start: 2021-01-01 | End: 2021-09-05 | Stop reason: HOSPADM

## 2021-01-01 RX ORDER — HYDRALAZINE HYDROCHLORIDE 25 MG/1
100 TABLET, FILM COATED ORAL EVERY 8 HOURS SCHEDULED
Status: DISCONTINUED | OUTPATIENT
Start: 2021-01-01 | End: 2021-09-05 | Stop reason: HOSPADM

## 2021-01-01 RX ORDER — FAMOTIDINE 10 MG/ML
20 INJECTION, SOLUTION INTRAVENOUS EVERY 12 HOURS SCHEDULED
Status: DISCONTINUED | OUTPATIENT
Start: 2021-01-01 | End: 2021-01-01

## 2021-01-01 RX ORDER — FUROSEMIDE 10 MG/ML
20 INJECTION INTRAMUSCULAR; INTRAVENOUS EVERY 8 HOURS
Status: COMPLETED | OUTPATIENT
Start: 2021-01-01 | End: 2021-01-01

## 2021-01-01 RX ORDER — VECURONIUM BROMIDE 1 MG/ML
INJECTION, POWDER, LYOPHILIZED, FOR SOLUTION INTRAVENOUS
Status: COMPLETED
Start: 2021-01-01 | End: 2021-01-01

## 2021-01-01 RX ORDER — LABETALOL HYDROCHLORIDE 5 MG/ML
10 INJECTION, SOLUTION INTRAVENOUS ONCE
Status: COMPLETED | OUTPATIENT
Start: 2021-01-01 | End: 2021-01-01

## 2021-01-01 RX ORDER — CALCIUM GLUCONATE 20 MG/ML
1 INJECTION, SOLUTION INTRAVENOUS ONCE
Status: COMPLETED | OUTPATIENT
Start: 2021-01-01 | End: 2021-01-01

## 2021-01-01 RX ORDER — BISACODYL 10 MG
10 SUPPOSITORY, RECTAL RECTAL DAILY PRN
Status: DISCONTINUED | OUTPATIENT
Start: 2021-01-01 | End: 2021-09-05 | Stop reason: HOSPADM

## 2021-01-01 RX ORDER — INSULIN LISPRO 100 [IU]/ML
0-7 INJECTION, SOLUTION INTRAVENOUS; SUBCUTANEOUS AS NEEDED
Status: DISCONTINUED | OUTPATIENT
Start: 2021-01-01 | End: 2021-01-01

## 2021-01-01 RX ORDER — SODIUM CHLORIDE 9 MG/ML
100 INJECTION, SOLUTION INTRAVENOUS CONTINUOUS
Status: DISCONTINUED | OUTPATIENT
Start: 2021-01-01 | End: 2021-01-01

## 2021-01-01 RX ORDER — VANCOMYCIN 1.75 GRAM/500 ML IN 0.9 % SODIUM CHLORIDE INTRAVENOUS
20 ONCE
Status: COMPLETED | OUTPATIENT
Start: 2021-01-01 | End: 2021-01-01

## 2021-01-01 RX ORDER — SILDENAFIL CITRATE 20 MG/1
20 TABLET ORAL EVERY 8 HOURS SCHEDULED
Status: DISCONTINUED | OUTPATIENT
Start: 2021-01-01 | End: 2021-09-05 | Stop reason: HOSPADM

## 2021-01-01 RX ORDER — INSULIN LISPRO 100 [IU]/ML
0-9 INJECTION, SOLUTION INTRAVENOUS; SUBCUTANEOUS AS NEEDED
Status: DISCONTINUED | OUTPATIENT
Start: 2021-01-01 | End: 2021-01-01

## 2021-01-01 RX ORDER — AZITHROMYCIN 250 MG/1
500 TABLET, FILM COATED ORAL DAILY
Status: COMPLETED | OUTPATIENT
Start: 2021-01-01 | End: 2021-01-01

## 2021-01-01 RX ORDER — MIDAZOLAM HCL IN 0.9 % NACL/PF 1 MG/ML
1-10 PLASTIC BAG, INJECTION (ML) INTRAVENOUS
Status: DISCONTINUED | OUTPATIENT
Start: 2021-01-01 | End: 2021-09-05 | Stop reason: HOSPADM

## 2021-01-01 RX ORDER — ETOMIDATE 2 MG/ML
20 INJECTION INTRAVENOUS ONCE
Status: COMPLETED | OUTPATIENT
Start: 2021-01-01 | End: 2021-01-01

## 2021-01-01 RX ORDER — FUROSEMIDE 10 MG/ML
80 INJECTION INTRAMUSCULAR; INTRAVENOUS EVERY 6 HOURS
Status: DISCONTINUED | OUTPATIENT
Start: 2021-01-01 | End: 2021-09-05 | Stop reason: HOSPADM

## 2021-01-01 RX ORDER — POTASSIUM CHLORIDE 7.45 MG/ML
10 INJECTION INTRAVENOUS
Status: DISCONTINUED | OUTPATIENT
Start: 2021-01-01 | End: 2021-01-01

## 2021-01-01 RX ORDER — AMLODIPINE BESYLATE 5 MG/1
10 TABLET ORAL
Status: DISCONTINUED | OUTPATIENT
Start: 2021-01-01 | End: 2021-09-05 | Stop reason: HOSPADM

## 2021-01-01 RX ORDER — LIDOCAINE 50 MG/G
OINTMENT TOPICAL
Status: DISPENSED
Start: 2021-01-01 | End: 2021-01-01

## 2021-01-01 RX ORDER — INSULIN LISPRO 100 [IU]/ML
0-7 INJECTION, SOLUTION INTRAVENOUS; SUBCUTANEOUS EVERY 6 HOURS SCHEDULED
Status: DISCONTINUED | OUTPATIENT
Start: 2021-01-01 | End: 2021-01-01

## 2021-01-01 RX ORDER — NICOTINE POLACRILEX 4 MG
15 LOZENGE BUCCAL
Status: DISCONTINUED | OUTPATIENT
Start: 2021-01-01 | End: 2021-09-05 | Stop reason: HOSPADM

## 2021-01-01 RX ORDER — CARVEDILOL 6.25 MG/1
6.25 TABLET ORAL ONCE
Status: COMPLETED | OUTPATIENT
Start: 2021-01-01 | End: 2021-01-01

## 2021-01-01 RX ORDER — LIDOCAINE HYDROCHLORIDE 20 MG/ML
INJECTION, SOLUTION EPIDURAL; INFILTRATION; INTRACAUDAL; PERINEURAL
Status: COMPLETED
Start: 2021-01-01 | End: 2021-01-01

## 2021-01-01 RX ORDER — LIDOCAINE 50 MG/G
OINTMENT TOPICAL AS NEEDED
Status: DISCONTINUED | OUTPATIENT
Start: 2021-01-01 | End: 2021-09-05 | Stop reason: HOSPADM

## 2021-01-01 RX ORDER — LORAZEPAM 2 MG/ML
4 INJECTION INTRAMUSCULAR
Status: DISCONTINUED | OUTPATIENT
Start: 2021-01-01 | End: 2021-01-01

## 2021-01-01 RX ORDER — POTASSIUM CHLORIDE 7.45 MG/ML
10 INJECTION INTRAVENOUS
Status: COMPLETED | OUTPATIENT
Start: 2021-01-01 | End: 2021-01-01

## 2021-01-01 RX ORDER — FUROSEMIDE 10 MG/ML
INJECTION INTRAMUSCULAR; INTRAVENOUS
Status: COMPLETED
Start: 2021-01-01 | End: 2021-01-01

## 2021-01-01 RX ORDER — HYDRALAZINE HYDROCHLORIDE 25 MG/1
75 TABLET, FILM COATED ORAL EVERY 8 HOURS SCHEDULED
Status: DISCONTINUED | OUTPATIENT
Start: 2021-01-01 | End: 2021-01-01

## 2021-01-01 RX ORDER — BUMETANIDE 0.25 MG/ML
1 INJECTION INTRAMUSCULAR; INTRAVENOUS ONCE
Status: COMPLETED | OUTPATIENT
Start: 2021-01-01 | End: 2021-01-01

## 2021-01-01 RX ORDER — IPRATROPIUM BROMIDE AND ALBUTEROL SULFATE 2.5; .5 MG/3ML; MG/3ML
3 SOLUTION RESPIRATORY (INHALATION)
Status: DISCONTINUED | OUTPATIENT
Start: 2021-01-01 | End: 2021-01-01 | Stop reason: SDUPTHER

## 2021-01-01 RX ORDER — HYDRALAZINE HYDROCHLORIDE 20 MG/ML
20 INJECTION INTRAMUSCULAR; INTRAVENOUS EVERY 6 HOURS PRN
Status: DISCONTINUED | OUTPATIENT
Start: 2021-01-01 | End: 2021-01-01

## 2021-01-01 RX ORDER — OLANZAPINE 10 MG/2ML
1 INJECTION, POWDER, LYOPHILIZED, FOR SOLUTION INTRAMUSCULAR
Status: DISCONTINUED | OUTPATIENT
Start: 2021-01-01 | End: 2021-09-05 | Stop reason: HOSPADM

## 2021-01-01 RX ORDER — METHYLPREDNISOLONE SODIUM SUCCINATE 125 MG/2ML
125 INJECTION, POWDER, LYOPHILIZED, FOR SOLUTION INTRAMUSCULAR; INTRAVENOUS EVERY 6 HOURS
Status: DISCONTINUED | OUTPATIENT
Start: 2021-01-01 | End: 2021-01-01

## 2021-01-01 RX ORDER — LORAZEPAM 2 MG/ML
1 INJECTION INTRAMUSCULAR
Status: DISCONTINUED | OUTPATIENT
Start: 2021-01-01 | End: 2021-01-01

## 2021-01-01 RX ORDER — INSULIN LISPRO 100 [IU]/ML
0-9 INJECTION, SOLUTION INTRAVENOUS; SUBCUTANEOUS EVERY 6 HOURS SCHEDULED
Status: DISCONTINUED | OUTPATIENT
Start: 2021-01-01 | End: 2021-01-01

## 2021-01-01 RX ORDER — AMLODIPINE BESYLATE 5 MG/1
10 TABLET ORAL
Status: DISCONTINUED | OUTPATIENT
Start: 2021-01-01 | End: 2021-01-01

## 2021-01-01 RX ORDER — SUCCINYLCHOLINE CHLORIDE 20 MG/ML
INJECTION INTRAMUSCULAR; INTRAVENOUS
Status: DISCONTINUED
Start: 2021-01-01 | End: 2021-01-01 | Stop reason: WASHOUT

## 2021-01-01 RX ORDER — ATROPINE SULFATE 0.4 MG/ML
0.4 AMPUL (ML) INJECTION ONCE
Status: DISCONTINUED | OUTPATIENT
Start: 2021-01-01 | End: 2021-01-01

## 2021-01-01 RX ORDER — LORAZEPAM 1 MG/1
4 TABLET ORAL
Status: DISCONTINUED | OUTPATIENT
Start: 2021-01-01 | End: 2021-01-01

## 2021-01-01 RX ORDER — LIDOCAINE HYDROCHLORIDE 20 MG/ML
INJECTION, SOLUTION INFILTRATION; PERINEURAL AS NEEDED
Status: DISCONTINUED | OUTPATIENT
Start: 2021-01-01 | End: 2021-01-01 | Stop reason: HOSPADM

## 2021-01-01 RX ORDER — POTASSIUM CHLORIDE 1.5 G/1.77G
20 POWDER, FOR SOLUTION ORAL 2 TIMES DAILY
Status: DISCONTINUED | OUTPATIENT
Start: 2021-01-01 | End: 2021-01-01

## 2021-01-01 RX ORDER — POTASSIUM CHLORIDE 20 MEQ/1
40 TABLET, EXTENDED RELEASE ORAL ONCE
Status: COMPLETED | OUTPATIENT
Start: 2021-01-01 | End: 2021-01-01

## 2021-01-01 RX ORDER — POTASSIUM CHLORIDE 1.5 G/1.77G
40 POWDER, FOR SOLUTION ORAL ONCE
Status: COMPLETED | OUTPATIENT
Start: 2021-01-01 | End: 2021-01-01

## 2021-01-01 RX ORDER — FENTANYL CITRATE 50 UG/ML
50 INJECTION, SOLUTION INTRAMUSCULAR; INTRAVENOUS ONCE
Status: DISCONTINUED | OUTPATIENT
Start: 2021-01-01 | End: 2021-01-01

## 2021-01-01 RX ORDER — FAMOTIDINE 20 MG/1
20 TABLET, FILM COATED ORAL DAILY
Status: DISCONTINUED | OUTPATIENT
Start: 2021-01-01 | End: 2021-09-05 | Stop reason: HOSPADM

## 2021-01-01 RX ORDER — ALBUMIN (HUMAN) 12.5 G/50ML
25 SOLUTION INTRAVENOUS EVERY 8 HOURS
Status: COMPLETED | OUTPATIENT
Start: 2021-01-01 | End: 2021-01-01

## 2021-01-01 RX ORDER — SODIUM CHLORIDE 0.9 % (FLUSH) 0.9 %
3-10 SYRINGE (ML) INJECTION AS NEEDED
Status: DISCONTINUED | OUTPATIENT
Start: 2021-01-01 | End: 2021-09-05 | Stop reason: HOSPADM

## 2021-01-01 RX ORDER — ARFORMOTEROL TARTRATE 15 UG/2ML
15 SOLUTION RESPIRATORY (INHALATION)
Status: DISCONTINUED | OUTPATIENT
Start: 2021-01-01 | End: 2021-09-05 | Stop reason: HOSPADM

## 2021-01-01 RX ORDER — TERAZOSIN 5 MG/1
5 CAPSULE ORAL EVERY 12 HOURS SCHEDULED
Status: DISCONTINUED | OUTPATIENT
Start: 2021-01-01 | End: 2021-01-01

## 2021-01-01 RX ORDER — MIDAZOLAM HYDROCHLORIDE 1 MG/ML
2 INJECTION, SOLUTION INTRAVENOUS
Status: DISCONTINUED | OUTPATIENT
Start: 2021-01-01 | End: 2021-01-01

## 2021-01-01 RX ORDER — PROPOFOL 10 MG/ML
VIAL (ML) INTRAVENOUS AS NEEDED
Status: DISCONTINUED | OUTPATIENT
Start: 2021-01-01 | End: 2021-01-01 | Stop reason: SURG

## 2021-01-01 RX ORDER — DIPHENOXYLATE HYDROCHLORIDE AND ATROPINE SULFATE 2.5; .025 MG/1; MG/1
1 TABLET ORAL DAILY
Status: DISCONTINUED | OUTPATIENT
Start: 2021-01-01 | End: 2021-09-05 | Stop reason: HOSPADM

## 2021-01-01 RX ORDER — DEXTROSE MONOHYDRATE 50 MG/ML
100 INJECTION, SOLUTION INTRAVENOUS CONTINUOUS
Status: DISCONTINUED | OUTPATIENT
Start: 2021-01-01 | End: 2021-01-01

## 2021-01-01 RX ORDER — METHYLPREDNISOLONE SODIUM SUCCINATE 40 MG/ML
40 INJECTION, POWDER, LYOPHILIZED, FOR SOLUTION INTRAMUSCULAR; INTRAVENOUS EVERY 8 HOURS
Status: DISCONTINUED | OUTPATIENT
Start: 2021-01-01 | End: 2021-09-05 | Stop reason: HOSPADM

## 2021-01-01 RX ORDER — LABETALOL HYDROCHLORIDE 5 MG/ML
INJECTION, SOLUTION INTRAVENOUS
Status: COMPLETED
Start: 2021-01-01 | End: 2021-01-01

## 2021-01-01 RX ORDER — ONDANSETRON 2 MG/ML
4 INJECTION INTRAMUSCULAR; INTRAVENOUS EVERY 6 HOURS PRN
Status: DISCONTINUED | OUTPATIENT
Start: 2021-01-01 | End: 2021-09-05 | Stop reason: HOSPADM

## 2021-01-01 RX ORDER — FENTANYL CITRATE-0.9 % NACL/PF 10 MCG/ML
50-300 PLASTIC BAG, INJECTION (ML) INTRAVENOUS
Status: DISCONTINUED | OUTPATIENT
Start: 2021-01-01 | End: 2021-01-01 | Stop reason: SDUPTHER

## 2021-01-01 RX ORDER — HYDRALAZINE HYDROCHLORIDE 20 MG/ML
20 INJECTION INTRAMUSCULAR; INTRAVENOUS EVERY 4 HOURS PRN
Status: DISCONTINUED | OUTPATIENT
Start: 2021-01-01 | End: 2021-09-05 | Stop reason: HOSPADM

## 2021-01-01 RX ORDER — SODIUM CHLORIDE 0.9 % (FLUSH) 0.9 %
3 SYRINGE (ML) INJECTION EVERY 12 HOURS SCHEDULED
Status: DISCONTINUED | OUTPATIENT
Start: 2021-01-01 | End: 2021-09-05 | Stop reason: HOSPADM

## 2021-01-01 RX ORDER — LORAZEPAM 2 MG/ML
1 INJECTION INTRAMUSCULAR EVERY 4 HOURS PRN
Status: DISCONTINUED | OUTPATIENT
Start: 2021-01-01 | End: 2021-01-01

## 2021-01-01 RX ORDER — IPRATROPIUM BROMIDE AND ALBUTEROL SULFATE 2.5; .5 MG/3ML; MG/3ML
3 SOLUTION RESPIRATORY (INHALATION)
Status: DISCONTINUED | OUTPATIENT
Start: 2021-01-01 | End: 2021-09-05 | Stop reason: HOSPADM

## 2021-01-01 RX ADMIN — IPRATROPIUM BROMIDE AND ALBUTEROL SULFATE 3 ML: 2.5; .5 SOLUTION RESPIRATORY (INHALATION) at 15:11

## 2021-01-01 RX ADMIN — CLONIDINE 1 PATCH: 0.3 PATCH, EXTENDED RELEASE TRANSDERMAL at 16:23

## 2021-01-01 RX ADMIN — ALBUMIN HUMAN 25 G: 0.25 SOLUTION INTRAVENOUS at 08:57

## 2021-01-01 RX ADMIN — LEUCINE, PHENYLALANINE, LYSINE, METHIONINE, ISOLEUCINE, VALINE, HISTIDINE, THREONINE, TRYPTOPHAN, ALANINE, GLYCINE, ARGININE, PROLINE, SERINE, TYROSINE, DEXTROSE: 365; 280; 290; 200; 300; 290; 240; 210; 90; 1035; 515; 575; 340; 250; 20; 20 INJECTION INTRAVENOUS at 17:16

## 2021-01-01 RX ADMIN — HYDRALAZINE HYDROCHLORIDE 20 MG: 20 INJECTION INTRAMUSCULAR; INTRAVENOUS at 11:49

## 2021-01-01 RX ADMIN — Medication 100 MG: at 08:43

## 2021-01-01 RX ADMIN — DEXMEDETOMIDINE HYDROCHLORIDE 1.5 MCG/KG/HR: 100 INJECTION, SOLUTION INTRAVENOUS at 12:10

## 2021-01-01 RX ADMIN — INSULIN LISPRO 6 UNITS: 100 INJECTION, SOLUTION INTRAVENOUS; SUBCUTANEOUS at 05:36

## 2021-01-01 RX ADMIN — BUDESONIDE 0.5 MG: 0.5 SUSPENSION RESPIRATORY (INHALATION) at 06:21

## 2021-01-01 RX ADMIN — IPRATROPIUM BROMIDE AND ALBUTEROL SULFATE 3 ML: 2.5; .5 SOLUTION RESPIRATORY (INHALATION) at 03:53

## 2021-01-01 RX ADMIN — METHYLPREDNISOLONE SODIUM SUCCINATE 125 MG: 125 INJECTION, POWDER, FOR SOLUTION INTRAMUSCULAR; INTRAVENOUS at 18:33

## 2021-01-01 RX ADMIN — Medication 3 ML: at 09:45

## 2021-01-01 RX ADMIN — ACETAMINOPHEN 650 MG: 325 TABLET, FILM COATED ORAL at 02:15

## 2021-01-01 RX ADMIN — ALBUMIN HUMAN 25 G: 0.25 SOLUTION INTRAVENOUS at 16:03

## 2021-01-01 RX ADMIN — WATER 10 ML: 1 INJECTION INTRAMUSCULAR; INTRAVENOUS; SUBCUTANEOUS at 09:08

## 2021-01-01 RX ADMIN — METHYLNALTREXONE BROMIDE 6 MG: 12 INJECTION, SOLUTION SUBCUTANEOUS at 12:33

## 2021-01-01 RX ADMIN — IPRATROPIUM BROMIDE AND ALBUTEROL SULFATE 3 ML: 2.5; .5 SOLUTION RESPIRATORY (INHALATION) at 15:39

## 2021-01-01 RX ADMIN — PROPOFOL 40 MCG/KG/MIN: 10 INJECTION, EMULSION INTRAVENOUS at 04:58

## 2021-01-01 RX ADMIN — FUROSEMIDE 20 MG: 10 INJECTION INTRAMUSCULAR; INTRAVENOUS at 14:50

## 2021-01-01 RX ADMIN — DEXMEDETOMIDINE HYDROCHLORIDE 1 MCG/KG/HR: 100 INJECTION, SOLUTION INTRAVENOUS at 07:08

## 2021-01-01 RX ADMIN — SODIUM CHLORIDE 10 MG/HR: 0.9 INJECTION, SOLUTION INTRAVENOUS at 15:47

## 2021-01-01 RX ADMIN — FENTANYL CITRATE 200 MCG/HR: 50 INJECTION, SOLUTION INTRAMUSCULAR; INTRAVENOUS at 18:31

## 2021-01-01 RX ADMIN — SILDENAFIL 20 MG: 20 TABLET, FILM COATED ORAL at 13:02

## 2021-01-01 RX ADMIN — METOPROLOL TARTRATE 25 MG: 25 TABLET, FILM COATED ORAL at 20:17

## 2021-01-01 RX ADMIN — HYDRALAZINE HYDROCHLORIDE 100 MG: 25 TABLET, FILM COATED ORAL at 15:03

## 2021-01-01 RX ADMIN — FENTANYL CITRATE 300 MCG/HR: 50 INJECTION, SOLUTION INTRAMUSCULAR; INTRAVENOUS at 23:02

## 2021-01-01 RX ADMIN — LABETALOL 20 MG/4 ML (5 MG/ML) INTRAVENOUS SYRINGE 2 MG/MIN: at 08:05

## 2021-01-01 RX ADMIN — POTASSIUM CHLORIDE 10 MEQ: 7.46 INJECTION, SOLUTION INTRAVENOUS at 10:35

## 2021-01-01 RX ADMIN — MORPHINE SULFATE 2 MG: 4 INJECTION INTRAVENOUS at 05:12

## 2021-01-01 RX ADMIN — EPOPROSTENOL 50 NG/KG/MIN: 1.5 INJECTION, POWDER, LYOPHILIZED, FOR SOLUTION INTRAVENOUS at 09:23

## 2021-01-01 RX ADMIN — METHYLPREDNISOLONE SODIUM SUCCINATE 40 MG: 40 INJECTION, POWDER, FOR SOLUTION INTRAMUSCULAR; INTRAVENOUS at 16:03

## 2021-01-01 RX ADMIN — BISACODYL 10 MG: 10 SUPPOSITORY RECTAL at 14:02

## 2021-01-01 RX ADMIN — NITROGLYCERIN 200 MCG/MIN: 20 INJECTION INTRAVENOUS at 16:23

## 2021-01-01 RX ADMIN — PROPOFOL 40 MCG/KG/MIN: 10 INJECTION, EMULSION INTRAVENOUS at 09:39

## 2021-01-01 RX ADMIN — LORAZEPAM 2 MG: 2 INJECTION INTRAMUSCULAR; INTRAVENOUS at 09:17

## 2021-01-01 RX ADMIN — SODIUM CHLORIDE 15 MG/HR: 0.9 INJECTION, SOLUTION INTRAVENOUS at 22:00

## 2021-01-01 RX ADMIN — LABETALOL 20 MG/4 ML (5 MG/ML) INTRAVENOUS SYRINGE 2 MG/MIN: at 23:43

## 2021-01-01 RX ADMIN — METOCLOPRAMIDE 5 MG: 5 INJECTION, SOLUTION INTRAMUSCULAR; INTRAVENOUS at 05:19

## 2021-01-01 RX ADMIN — AZITHROMYCIN DIHYDRATE 250 MG: 250 TABLET, FILM COATED ORAL at 09:45

## 2021-01-01 RX ADMIN — SUCCINYLCHOLINE CHLORIDE 100 MG: 20 INJECTION INTRAMUSCULAR; INTRAVENOUS at 05:59

## 2021-01-01 RX ADMIN — CEFTRIAXONE SODIUM 2 G: 2 INJECTION, POWDER, FOR SOLUTION INTRAMUSCULAR; INTRAVENOUS at 12:01

## 2021-01-01 RX ADMIN — FAMOTIDINE 20 MG: 10 INJECTION INTRAVENOUS at 08:33

## 2021-01-01 RX ADMIN — HYDRALAZINE HYDROCHLORIDE 20 MG: 20 INJECTION INTRAMUSCULAR; INTRAVENOUS at 07:02

## 2021-01-01 RX ADMIN — BUDESONIDE 0.5 MG: 0.5 SUSPENSION RESPIRATORY (INHALATION) at 06:13

## 2021-01-01 RX ADMIN — PROPOFOL 15 MCG/KG/MIN: 10 INJECTION, EMULSION INTRAVENOUS at 04:37

## 2021-01-01 RX ADMIN — ESMOLOL HYDROCHLORIDE 100 MCG/KG/MIN: 10 INJECTION INTRAVENOUS at 05:56

## 2021-01-01 RX ADMIN — LABETALOL HYDROCHLORIDE 0.5 MG/MIN: 5 INJECTION INTRAVENOUS at 15:28

## 2021-01-01 RX ADMIN — LABETALOL 20 MG/4 ML (5 MG/ML) INTRAVENOUS SYRINGE 1.5 MG/MIN: at 10:41

## 2021-01-01 RX ADMIN — LORAZEPAM 2 MG: 2 INJECTION INTRAMUSCULAR; INTRAVENOUS at 21:30

## 2021-01-01 RX ADMIN — CARBOXYMETHYLCELLULOSE SODIUM 1 DROP: 10 GEL OPHTHALMIC at 00:21

## 2021-01-01 RX ADMIN — INSULIN LISPRO 2 UNITS: 100 INJECTION, SOLUTION INTRAVENOUS; SUBCUTANEOUS at 18:09

## 2021-01-01 RX ADMIN — PROPOFOL 15 MCG/KG/MIN: 10 INJECTION, EMULSION INTRAVENOUS at 18:32

## 2021-01-01 RX ADMIN — CLEVIPIDINE 32 MG/HR: 0.5 EMULSION INTRAVENOUS at 12:44

## 2021-01-01 RX ADMIN — CLEVIPIDINE 20 MG/HR: 0.5 EMULSION INTRAVENOUS at 05:05

## 2021-01-01 RX ADMIN — METOPROLOL TARTRATE 25 MG: 25 TABLET, FILM COATED ORAL at 11:24

## 2021-01-01 RX ADMIN — Medication 100 MG: at 09:56

## 2021-01-01 RX ADMIN — IPRATROPIUM BROMIDE AND ALBUTEROL SULFATE 3 ML: 2.5; .5 SOLUTION RESPIRATORY (INHALATION) at 10:14

## 2021-01-01 RX ADMIN — METOCLOPRAMIDE 5 MG: 5 INJECTION, SOLUTION INTRAMUSCULAR; INTRAVENOUS at 11:38

## 2021-01-01 RX ADMIN — CARVEDILOL 3.12 MG: 3.12 TABLET, FILM COATED ORAL at 09:45

## 2021-01-01 RX ADMIN — ARFORMOTEROL TARTRATE 15 MCG: 15 SOLUTION RESPIRATORY (INHALATION) at 06:17

## 2021-01-01 RX ADMIN — SILDENAFIL 20 MG: 20 TABLET, FILM COATED ORAL at 21:37

## 2021-01-01 RX ADMIN — SODIUM CHLORIDE 32 UNITS/HR: 9 INJECTION, SOLUTION INTRAVENOUS at 05:25

## 2021-01-01 RX ADMIN — IPRATROPIUM BROMIDE AND ALBUTEROL SULFATE 3 ML: 2.5; .5 SOLUTION RESPIRATORY (INHALATION) at 03:05

## 2021-01-01 RX ADMIN — CLEVIPIDINE 21 MG/HR: 0.5 EMULSION INTRAVENOUS at 01:11

## 2021-01-01 RX ADMIN — MIDAZOLAM 8 MG/HR: 5 INJECTION INTRAMUSCULAR; INTRAVENOUS at 02:33

## 2021-01-01 RX ADMIN — PROPOFOL 40 MCG/KG/MIN: 10 INJECTION, EMULSION INTRAVENOUS at 04:52

## 2021-01-01 RX ADMIN — CARVEDILOL 12.5 MG: 6.25 TABLET, FILM COATED ORAL at 09:30

## 2021-01-01 RX ADMIN — SODIUM CHLORIDE 10 MG/HR: 0.9 INJECTION, SOLUTION INTRAVENOUS at 14:49

## 2021-01-01 RX ADMIN — METHYLPREDNISOLONE SODIUM SUCCINATE 125 MG: 125 INJECTION, POWDER, FOR SOLUTION INTRAMUSCULAR; INTRAVENOUS at 00:52

## 2021-01-01 RX ADMIN — METHYLPREDNISOLONE SODIUM SUCCINATE 40 MG: 40 INJECTION, POWDER, FOR SOLUTION INTRAMUSCULAR; INTRAVENOUS at 12:02

## 2021-01-01 RX ADMIN — SILDENAFIL 20 MG: 20 TABLET, FILM COATED ORAL at 06:11

## 2021-01-01 RX ADMIN — EPOPROSTENOL 50 NG/KG/MIN: 1.5 INJECTION, POWDER, LYOPHILIZED, FOR SOLUTION INTRAVENOUS at 18:18

## 2021-01-01 RX ADMIN — CLEVIPIDINE 32 MG/HR: 0.5 EMULSION INTRAVENOUS at 14:31

## 2021-01-01 RX ADMIN — LORAZEPAM 2 MG: 2 INJECTION INTRAMUSCULAR; INTRAVENOUS at 17:44

## 2021-01-01 RX ADMIN — CARVEDILOL 12.5 MG: 6.25 TABLET, FILM COATED ORAL at 08:22

## 2021-01-01 RX ADMIN — CARVEDILOL 3.12 MG: 3.12 TABLET, FILM COATED ORAL at 17:50

## 2021-01-01 RX ADMIN — Medication 100 MG: at 08:37

## 2021-01-01 RX ADMIN — CLONIDINE 1 PATCH: 0.3 PATCH, EXTENDED RELEASE TRANSDERMAL at 10:24

## 2021-01-01 RX ADMIN — IOPAMIDOL 100 ML: 755 INJECTION, SOLUTION INTRAVENOUS at 04:02

## 2021-01-01 RX ADMIN — VECURONIUM BROMIDE 10 MG: 1 INJECTION, POWDER, LYOPHILIZED, FOR SOLUTION INTRAVENOUS at 03:17

## 2021-01-01 RX ADMIN — ALBUMIN HUMAN 25 G: 0.25 SOLUTION INTRAVENOUS at 09:52

## 2021-01-01 RX ADMIN — POTASSIUM CHLORIDE 40 MEQ: 1500 TABLET, EXTENDED RELEASE ORAL at 03:06

## 2021-01-01 RX ADMIN — EPOPROSTENOL 50 NG/KG/MIN: 1.5 INJECTION, POWDER, LYOPHILIZED, FOR SOLUTION INTRAVENOUS at 04:49

## 2021-01-01 RX ADMIN — NICARDIPINE HYDROCHLORIDE 15 MG/HR: 25 INJECTION, SOLUTION INTRAVENOUS at 18:23

## 2021-01-01 RX ADMIN — CLEVIPIDINE 21 MG/HR: 0.5 EMULSION INTRAVENOUS at 05:21

## 2021-01-01 RX ADMIN — VECURONIUM BROMIDE 10 MG: 1 INJECTION, POWDER, LYOPHILIZED, FOR SOLUTION INTRAVENOUS at 08:36

## 2021-01-01 RX ADMIN — INSULIN HUMAN 12 UNITS: 100 INJECTION, SOLUTION PARENTERAL at 08:42

## 2021-01-01 RX ADMIN — INSULIN LISPRO 2 UNITS: 100 INJECTION, SOLUTION INTRAVENOUS; SUBCUTANEOUS at 18:30

## 2021-01-01 RX ADMIN — SODIUM NITROPRUSSIDE 0.3 MCG/KG/MIN: 25 INJECTION, SOLUTION, CONCENTRATE INTRAVENOUS at 17:18

## 2021-01-01 RX ADMIN — AMLODIPINE BESYLATE 10 MG: 5 TABLET ORAL at 08:23

## 2021-01-01 RX ADMIN — Medication 3 ML: at 08:27

## 2021-01-01 RX ADMIN — SILDENAFIL 20 MG: 20 TABLET, FILM COATED ORAL at 22:21

## 2021-01-01 RX ADMIN — SODIUM CHLORIDE 15 MG/HR: 0.9 INJECTION, SOLUTION INTRAVENOUS at 16:49

## 2021-01-01 RX ADMIN — HYDRALAZINE HYDROCHLORIDE 100 MG: 25 TABLET, FILM COATED ORAL at 04:55

## 2021-01-01 RX ADMIN — NICARDIPINE HYDROCHLORIDE 15 MG/HR: 25 INJECTION, SOLUTION INTRAVENOUS at 20:18

## 2021-01-01 RX ADMIN — INSULIN LISPRO 2 UNITS: 100 INJECTION, SOLUTION INTRAVENOUS; SUBCUTANEOUS at 23:53

## 2021-01-01 RX ADMIN — FUROSEMIDE 20 MG: 10 INJECTION, SOLUTION INTRAMUSCULAR; INTRAVENOUS at 00:52

## 2021-01-01 RX ADMIN — SODIUM CHLORIDE 15 MG/HR: 0.9 INJECTION, SOLUTION INTRAVENOUS at 11:58

## 2021-01-01 RX ADMIN — PROPOFOL 20 MCG/KG/MIN: 10 INJECTION, EMULSION INTRAVENOUS at 13:29

## 2021-01-01 RX ADMIN — LABETALOL HYDROCHLORIDE 10 MG: 5 INJECTION, SOLUTION INTRAVENOUS at 07:42

## 2021-01-01 RX ADMIN — CALCIUM GLUCONATE: 98 INJECTION, SOLUTION INTRAVENOUS at 18:00

## 2021-01-01 RX ADMIN — FENTANYL CITRATE 200 MCG/HR: 50 INJECTION INTRAVENOUS at 02:33

## 2021-01-01 RX ADMIN — HYDRALAZINE HYDROCHLORIDE 20 MG: 20 INJECTION INTRAMUSCULAR; INTRAVENOUS at 13:46

## 2021-01-01 RX ADMIN — IPRATROPIUM BROMIDE AND ALBUTEROL SULFATE 3 ML: 2.5; .5 SOLUTION RESPIRATORY (INHALATION) at 14:24

## 2021-01-01 RX ADMIN — BUDESONIDE 0.5 MG: 0.5 SUSPENSION RESPIRATORY (INHALATION) at 07:00

## 2021-01-01 RX ADMIN — SODIUM CHLORIDE 28 UNITS/HR: 9 INJECTION, SOLUTION INTRAVENOUS at 23:05

## 2021-01-01 RX ADMIN — SILDENAFIL 20 MG: 20 TABLET, FILM COATED ORAL at 21:40

## 2021-01-01 RX ADMIN — DEXMEDETOMIDINE HYDROCHLORIDE 1 MCG/KG/HR: 100 INJECTION, SOLUTION INTRAVENOUS at 22:44

## 2021-01-01 RX ADMIN — CLEVIPIDINE 12 MG/HR: 0.5 EMULSION INTRAVENOUS at 18:33

## 2021-01-01 RX ADMIN — METHYLPREDNISOLONE SODIUM SUCCINATE 40 MG: 40 INJECTION, POWDER, FOR SOLUTION INTRAMUSCULAR; INTRAVENOUS at 00:34

## 2021-01-01 RX ADMIN — NICARDIPINE HYDROCHLORIDE 10 MG/HR: 25 INJECTION, SOLUTION INTRAVENOUS at 04:18

## 2021-01-01 RX ADMIN — IPRATROPIUM BROMIDE AND ALBUTEROL SULFATE 3 ML: 2.5; .5 SOLUTION RESPIRATORY (INHALATION) at 06:21

## 2021-01-01 RX ADMIN — PIPERACILLIN AND TAZOBACTAM 3.38 G: 3; .375 INJECTION, POWDER, LYOPHILIZED, FOR SOLUTION INTRAVENOUS at 22:54

## 2021-01-01 RX ADMIN — IPRATROPIUM BROMIDE AND ALBUTEROL SULFATE 3 ML: 2.5; .5 SOLUTION RESPIRATORY (INHALATION) at 14:55

## 2021-01-01 RX ADMIN — SILDENAFIL 20 MG: 20 TABLET, FILM COATED ORAL at 05:19

## 2021-01-01 RX ADMIN — PROPOFOL 40 MCG/KG/MIN: 10 INJECTION, EMULSION INTRAVENOUS at 13:14

## 2021-01-01 RX ADMIN — ASPIRIN 324 MG: 81 TABLET, CHEWABLE ORAL at 02:16

## 2021-01-01 RX ADMIN — DEXMEDETOMIDINE HYDROCHLORIDE 1.5 MCG/KG/HR: 100 INJECTION, SOLUTION INTRAVENOUS at 05:06

## 2021-01-01 RX ADMIN — INSULIN LISPRO 2 UNITS: 100 INJECTION, SOLUTION INTRAVENOUS; SUBCUTANEOUS at 17:31

## 2021-01-01 RX ADMIN — CLEVIPIDINE 2 MG/HR: 0.5 EMULSION INTRAVENOUS at 16:22

## 2021-01-01 RX ADMIN — DEXMEDETOMIDINE HYDROCHLORIDE 0.5 MCG/KG/HR: 100 INJECTION, SOLUTION INTRAVENOUS at 16:46

## 2021-01-01 RX ADMIN — PIPERACILLIN AND TAZOBACTAM 3.38 G: 3; .375 INJECTION, POWDER, LYOPHILIZED, FOR SOLUTION INTRAVENOUS at 03:20

## 2021-01-01 RX ADMIN — SODIUM CHLORIDE 7.5 MG/HR: 0.9 INJECTION, SOLUTION INTRAVENOUS at 19:20

## 2021-01-01 RX ADMIN — MIDAZOLAM 2 MG/HR: 5 INJECTION, SOLUTION INTRAMUSCULAR; INTRAVENOUS at 18:13

## 2021-01-01 RX ADMIN — SILDENAFIL 20 MG: 20 TABLET, FILM COATED ORAL at 21:14

## 2021-01-01 RX ADMIN — FAMOTIDINE 20 MG: 20 TABLET, FILM COATED ORAL at 09:44

## 2021-01-01 RX ADMIN — HYDRALAZINE HYDROCHLORIDE 20 MG: 20 INJECTION INTRAMUSCULAR; INTRAVENOUS at 10:43

## 2021-01-01 RX ADMIN — METHYLPREDNISOLONE SODIUM SUCCINATE 125 MG: 125 INJECTION, POWDER, FOR SOLUTION INTRAMUSCULAR; INTRAVENOUS at 01:10

## 2021-01-01 RX ADMIN — THERA TABS 1 TABLET: TAB at 09:45

## 2021-01-01 RX ADMIN — METHYLPREDNISOLONE SODIUM SUCCINATE 40 MG: 40 INJECTION, POWDER, FOR SOLUTION INTRAMUSCULAR; INTRAVENOUS at 15:07

## 2021-01-01 RX ADMIN — BUMETANIDE 2 MG: 0.25 INJECTION, SOLUTION INTRAMUSCULAR; INTRAVENOUS at 03:25

## 2021-01-01 RX ADMIN — IPRATROPIUM BROMIDE AND ALBUTEROL SULFATE 3 ML: 2.5; .5 SOLUTION RESPIRATORY (INHALATION) at 03:50

## 2021-01-01 RX ADMIN — SILDENAFIL 20 MG: 20 TABLET, FILM COATED ORAL at 14:40

## 2021-01-01 RX ADMIN — Medication 3 ML: at 20:32

## 2021-01-01 RX ADMIN — FENTANYL CITRATE 175 MCG/HR: 50 INJECTION INTRAVENOUS at 23:56

## 2021-01-01 RX ADMIN — FOLIC ACID 1 MG: 1 TABLET ORAL at 08:10

## 2021-01-01 RX ADMIN — IPRATROPIUM BROMIDE AND ALBUTEROL SULFATE 3 ML: 2.5; .5 SOLUTION RESPIRATORY (INHALATION) at 18:12

## 2021-01-01 RX ADMIN — HYDRALAZINE HYDROCHLORIDE 100 MG: 25 TABLET, FILM COATED ORAL at 06:09

## 2021-01-01 RX ADMIN — Medication 1200 MG: at 09:07

## 2021-01-01 RX ADMIN — SODIUM CHLORIDE 15 MG/HR: 0.9 INJECTION, SOLUTION INTRAVENOUS at 23:25

## 2021-01-01 RX ADMIN — METOCLOPRAMIDE 5 MG: 5 INJECTION, SOLUTION INTRAMUSCULAR; INTRAVENOUS at 13:30

## 2021-01-01 RX ADMIN — SODIUM CHLORIDE 15 MG/HR: 0.9 INJECTION, SOLUTION INTRAVENOUS at 01:33

## 2021-01-01 RX ADMIN — IPRATROPIUM BROMIDE AND ALBUTEROL SULFATE 3 ML: 2.5; .5 SOLUTION RESPIRATORY (INHALATION) at 00:11

## 2021-01-01 RX ADMIN — HYDRALAZINE HYDROCHLORIDE 100 MG: 25 TABLET, FILM COATED ORAL at 06:18

## 2021-01-01 RX ADMIN — METOCLOPRAMIDE 5 MG: 5 INJECTION, SOLUTION INTRAMUSCULAR; INTRAVENOUS at 21:37

## 2021-01-01 RX ADMIN — AMLODIPINE BESYLATE 10 MG: 5 TABLET ORAL at 08:05

## 2021-01-01 RX ADMIN — SILDENAFIL 20 MG: 20 TABLET, FILM COATED ORAL at 20:46

## 2021-01-01 RX ADMIN — IPRATROPIUM BROMIDE AND ALBUTEROL SULFATE 3 ML: 2.5; .5 SOLUTION RESPIRATORY (INHALATION) at 00:15

## 2021-01-01 RX ADMIN — CARBOXYMETHYLCELLULOSE SODIUM 1 DROP: 10 GEL OPHTHALMIC at 20:47

## 2021-01-01 RX ADMIN — MIDAZOLAM 8 MG/HR: 5 INJECTION INTRAMUSCULAR; INTRAVENOUS at 21:57

## 2021-01-01 RX ADMIN — LABETALOL 20 MG/4 ML (5 MG/ML) INTRAVENOUS SYRINGE 2 MG/MIN: at 03:06

## 2021-01-01 RX ADMIN — IPRATROPIUM BROMIDE AND ALBUTEROL SULFATE 3 ML: 2.5; .5 SOLUTION RESPIRATORY (INHALATION) at 06:40

## 2021-01-01 RX ADMIN — MIDAZOLAM HYDROCHLORIDE 2 MG: 1 INJECTION INTRAMUSCULAR; INTRAVENOUS at 08:37

## 2021-01-01 RX ADMIN — IOPAMIDOL 100 ML: 755 INJECTION, SOLUTION INTRAVENOUS at 12:06

## 2021-01-01 RX ADMIN — SODIUM CHLORIDE 15 MG/HR: 0.9 INJECTION, SOLUTION INTRAVENOUS at 07:25

## 2021-01-01 RX ADMIN — PROPOFOL 40 MCG/KG/MIN: 10 INJECTION, EMULSION INTRAVENOUS at 08:40

## 2021-01-01 RX ADMIN — Medication 100 MG: at 08:40

## 2021-01-01 RX ADMIN — SODIUM CHLORIDE 12 UNITS/HR: 9 INJECTION, SOLUTION INTRAVENOUS at 18:13

## 2021-01-01 RX ADMIN — CLEVIPIDINE 20 MG/HR: 0.5 EMULSION INTRAVENOUS at 06:34

## 2021-01-01 RX ADMIN — CLEVIPIDINE 32 MG/HR: 0.5 EMULSION INTRAVENOUS at 09:26

## 2021-01-01 RX ADMIN — IPRATROPIUM BROMIDE AND ALBUTEROL SULFATE 3 ML: 2.5; .5 SOLUTION RESPIRATORY (INHALATION) at 06:43

## 2021-01-01 RX ADMIN — SILDENAFIL 20 MG: 20 TABLET, FILM COATED ORAL at 04:55

## 2021-01-01 RX ADMIN — CLEVIPIDINE 32 MG/HR: 0.5 EMULSION INTRAVENOUS at 02:56

## 2021-01-01 RX ADMIN — METOPROLOL TARTRATE 25 MG: 25 TABLET, FILM COATED ORAL at 20:18

## 2021-01-01 RX ADMIN — CLEVIPIDINE 10 MG/HR: 0.5 EMULSION INTRAVENOUS at 01:13

## 2021-01-01 RX ADMIN — HYDRALAZINE HYDROCHLORIDE 75 MG: 25 TABLET, FILM COATED ORAL at 20:46

## 2021-01-01 RX ADMIN — CARBOXYMETHYLCELLULOSE SODIUM 1 DROP: 10 GEL OPHTHALMIC at 16:35

## 2021-01-01 RX ADMIN — SODIUM CHLORIDE 4 UNITS/HR: 9 INJECTION, SOLUTION INTRAVENOUS at 12:02

## 2021-01-01 RX ADMIN — CLEVIPIDINE 21 MG/HR: 0.5 EMULSION INTRAVENOUS at 22:00

## 2021-01-01 RX ADMIN — Medication 3 ML: at 20:34

## 2021-01-01 RX ADMIN — FENTANYL CITRATE 200 MCG/HR: 50 INJECTION INTRAVENOUS at 11:55

## 2021-01-01 RX ADMIN — IPRATROPIUM BROMIDE AND ALBUTEROL SULFATE 3 ML: 2.5; .5 SOLUTION RESPIRATORY (INHALATION) at 11:40

## 2021-01-01 RX ADMIN — THERA TABS 1 TABLET: TAB at 08:59

## 2021-01-01 RX ADMIN — Medication 3 ML: at 18:59

## 2021-01-01 RX ADMIN — SODIUM CHLORIDE 5 MG/HR: 0.9 INJECTION, SOLUTION INTRAVENOUS at 03:41

## 2021-01-01 RX ADMIN — BUDESONIDE 0.5 MG: 0.5 SUSPENSION RESPIRATORY (INHALATION) at 06:40

## 2021-01-01 RX ADMIN — FENTANYL CITRATE 200 MCG/HR: 50 INJECTION INTRAVENOUS at 09:24

## 2021-01-01 RX ADMIN — PIPERACILLIN AND TAZOBACTAM 3.38 G: 3; .375 INJECTION, POWDER, LYOPHILIZED, FOR SOLUTION INTRAVENOUS at 20:27

## 2021-01-01 RX ADMIN — Medication 3 ML: at 09:31

## 2021-01-01 RX ADMIN — HYDRALAZINE HYDROCHLORIDE 100 MG: 25 TABLET, FILM COATED ORAL at 05:13

## 2021-01-01 RX ADMIN — DOPAMINE HYDROCHLORIDE 3 MCG/KG/MIN: 160 INJECTION, SOLUTION INTRAVENOUS at 09:09

## 2021-01-01 RX ADMIN — LABETALOL 20 MG/4 ML (5 MG/ML) INTRAVENOUS SYRINGE 10 MG: at 07:42

## 2021-01-01 RX ADMIN — ALBUMIN HUMAN 25 G: 0.25 SOLUTION INTRAVENOUS at 00:51

## 2021-01-01 RX ADMIN — HEPARIN SODIUM 2200 UNITS: 1000 INJECTION INTRAVENOUS; SUBCUTANEOUS at 13:26

## 2021-01-01 RX ADMIN — FENTANYL CITRATE 150 MCG/HR: 50 INJECTION INTRAVENOUS at 05:29

## 2021-01-01 RX ADMIN — SODIUM CHLORIDE 10 MG/HR: 0.9 INJECTION, SOLUTION INTRAVENOUS at 13:11

## 2021-01-01 RX ADMIN — BUDESONIDE 0.5 MG: 0.5 SUSPENSION RESPIRATORY (INHALATION) at 19:00

## 2021-01-01 RX ADMIN — IPRATROPIUM BROMIDE AND ALBUTEROL SULFATE 3 ML: 2.5; .5 SOLUTION RESPIRATORY (INHALATION) at 10:59

## 2021-01-01 RX ADMIN — PIPERACILLIN AND TAZOBACTAM 3.38 G: 3; .375 INJECTION, POWDER, LYOPHILIZED, FOR SOLUTION INTRAVENOUS at 12:44

## 2021-01-01 RX ADMIN — HYDRALAZINE HYDROCHLORIDE 20 MG: 20 INJECTION INTRAMUSCULAR; INTRAVENOUS at 10:10

## 2021-01-01 RX ADMIN — CLEVIPIDINE 15 MG/HR: 0.5 EMULSION INTRAVENOUS at 09:13

## 2021-01-01 RX ADMIN — SILDENAFIL 20 MG: 20 TABLET, FILM COATED ORAL at 13:48

## 2021-01-01 RX ADMIN — METOCLOPRAMIDE 5 MG: 5 INJECTION, SOLUTION INTRAMUSCULAR; INTRAVENOUS at 14:40

## 2021-01-01 RX ADMIN — DEXMEDETOMIDINE HYDROCHLORIDE 1 MCG/KG/HR: 100 INJECTION, SOLUTION INTRAVENOUS at 18:01

## 2021-01-01 RX ADMIN — SODIUM CHLORIDE 15 MG/HR: 0.9 INJECTION, SOLUTION INTRAVENOUS at 15:37

## 2021-01-01 RX ADMIN — DEXMEDETOMIDINE HYDROCHLORIDE 1.5 MCG/KG/HR: 100 INJECTION, SOLUTION INTRAVENOUS at 07:55

## 2021-01-01 RX ADMIN — FENTANYL CITRATE 100 MCG/HR: 50 INJECTION INTRAVENOUS at 06:23

## 2021-01-01 RX ADMIN — NEBIVOLOL HYDROCHLORIDE 10 MG: 5 TABLET ORAL at 02:28

## 2021-01-01 RX ADMIN — VECURONIUM BROMIDE 10 MG: 1 INJECTION, POWDER, LYOPHILIZED, FOR SOLUTION INTRAVENOUS at 17:26

## 2021-01-01 RX ADMIN — POTASSIUM CHLORIDE 40 MEQ: 1.5 POWDER, FOR SOLUTION ORAL at 09:08

## 2021-01-01 RX ADMIN — AMLODIPINE BESYLATE 10 MG: 5 TABLET ORAL at 08:33

## 2021-01-01 RX ADMIN — FUROSEMIDE 20 MG: 10 INJECTION, SOLUTION INTRAMUSCULAR; INTRAVENOUS at 16:03

## 2021-01-01 RX ADMIN — THERA TABS 1 TABLET: TAB at 09:56

## 2021-01-01 RX ADMIN — POTASSIUM CHLORIDE 10 MEQ: 10 INJECTION, SOLUTION INTRAVENOUS at 11:17

## 2021-01-01 RX ADMIN — CLEVIPIDINE 20 MG/HR: 0.5 EMULSION INTRAVENOUS at 19:20

## 2021-01-01 RX ADMIN — HYDRALAZINE HYDROCHLORIDE 20 MG: 20 INJECTION INTRAMUSCULAR; INTRAVENOUS at 10:57

## 2021-01-01 RX ADMIN — IPRATROPIUM BROMIDE AND ALBUTEROL SULFATE 3 ML: 2.5; .5 SOLUTION RESPIRATORY (INHALATION) at 14:18

## 2021-01-01 RX ADMIN — CALCIUM GLUCONATE 1 G: 20 INJECTION, SOLUTION INTRAVENOUS at 08:34

## 2021-01-01 RX ADMIN — METOCLOPRAMIDE 5 MG: 5 INJECTION, SOLUTION INTRAMUSCULAR; INTRAVENOUS at 05:04

## 2021-01-01 RX ADMIN — LABETALOL 20 MG/4 ML (5 MG/ML) INTRAVENOUS SYRINGE 2 MG/MIN: at 13:36

## 2021-01-01 RX ADMIN — VECURONIUM BROMIDE 10 MG: 1 INJECTION, POWDER, LYOPHILIZED, FOR SOLUTION INTRAVENOUS at 07:18

## 2021-01-01 RX ADMIN — PIPERACILLIN SODIUM AND TAZOBACTAM SODIUM 4.5 G: 4; .5 INJECTION, POWDER, LYOPHILIZED, FOR SOLUTION INTRAVENOUS at 20:47

## 2021-01-01 RX ADMIN — HYDRALAZINE HYDROCHLORIDE 100 MG: 25 TABLET, FILM COATED ORAL at 09:00

## 2021-01-01 RX ADMIN — HYDRALAZINE HYDROCHLORIDE 100 MG: 25 TABLET, FILM COATED ORAL at 05:14

## 2021-01-01 RX ADMIN — Medication 3 ML: at 09:05

## 2021-01-01 RX ADMIN — PROPOFOL 40 MCG/KG/MIN: 10 INJECTION, EMULSION INTRAVENOUS at 16:02

## 2021-01-01 RX ADMIN — INSULIN LISPRO 2 UNITS: 100 INJECTION, SOLUTION INTRAVENOUS; SUBCUTANEOUS at 05:37

## 2021-01-01 RX ADMIN — HYDRALAZINE HYDROCHLORIDE 20 MG: 20 INJECTION INTRAMUSCULAR; INTRAVENOUS at 20:50

## 2021-01-01 RX ADMIN — SODIUM CHLORIDE 10 MG/HR: 0.9 INJECTION, SOLUTION INTRAVENOUS at 05:37

## 2021-01-01 RX ADMIN — METOCLOPRAMIDE 5 MG: 5 INJECTION, SOLUTION INTRAMUSCULAR; INTRAVENOUS at 13:02

## 2021-01-01 RX ADMIN — DEXMEDETOMIDINE HYDROCHLORIDE 1 MCG/KG/HR: 100 INJECTION, SOLUTION INTRAVENOUS at 02:39

## 2021-01-01 RX ADMIN — FENTANYL CITRATE 300 MCG/HR: 50 INJECTION INTRAVENOUS at 06:23

## 2021-01-01 RX ADMIN — ALTEPLASE: 2.2 INJECTION, POWDER, LYOPHILIZED, FOR SOLUTION INTRAVENOUS at 12:39

## 2021-01-01 RX ADMIN — NITROGLYCERIN 100 MCG/MIN: 20 INJECTION INTRAVENOUS at 11:39

## 2021-01-01 RX ADMIN — LORAZEPAM 2 MG: 2 INJECTION INTRAMUSCULAR; INTRAVENOUS at 18:19

## 2021-01-01 RX ADMIN — SODIUM CHLORIDE 12.5 MG/HR: 0.9 INJECTION, SOLUTION INTRAVENOUS at 19:41

## 2021-01-01 RX ADMIN — IPRATROPIUM BROMIDE AND ALBUTEROL SULFATE 3 ML: 2.5; .5 SOLUTION RESPIRATORY (INHALATION) at 18:25

## 2021-01-01 RX ADMIN — ATROPINE SULFATE 1 MG: 0.1 INJECTION PARENTERAL at 08:28

## 2021-01-01 RX ADMIN — SILDENAFIL 20 MG: 20 TABLET, FILM COATED ORAL at 05:06

## 2021-01-01 RX ADMIN — FAMOTIDINE 20 MG: 20 TABLET, FILM COATED ORAL at 08:05

## 2021-01-01 RX ADMIN — FAMOTIDINE 20 MG: 20 TABLET, FILM COATED ORAL at 08:37

## 2021-01-01 RX ADMIN — ARFORMOTEROL TARTRATE 15 MCG: 15 SOLUTION RESPIRATORY (INHALATION) at 07:36

## 2021-01-01 RX ADMIN — LABETALOL HYDROCHLORIDE 2 MG/MIN: 5 INJECTION INTRAVENOUS at 23:11

## 2021-01-01 RX ADMIN — ASCORBIC ACID, VITAMIN A PALMITATE, CHOLECALCIFEROL, THIAMINE HYDROCHLORIDE, RIBOFLAVIN-5 PHOSPHATE SODIUM, PYRIDOXINE HYDROCHLORIDE, NIACINAMIDE, DEXPANTHENOL, ALPHA-TOCOPHEROL ACETATE, VITAMIN K1, FOLIC ACID, BIOTIN, CYANOCOBALAMIN: 200; 3300; 200; 6; 3.6; 6; 40; 15; 10; 150; 600; 60; 5 INJECTION, SOLUTION INTRAVENOUS at 17:59

## 2021-01-01 RX ADMIN — CARVEDILOL 12.5 MG: 6.25 TABLET, FILM COATED ORAL at 18:00

## 2021-01-01 RX ADMIN — HYDRALAZINE HYDROCHLORIDE 20 MG: 20 INJECTION INTRAMUSCULAR; INTRAVENOUS at 02:56

## 2021-01-01 RX ADMIN — AMLODIPINE BESYLATE 10 MG: 5 TABLET ORAL at 06:26

## 2021-01-01 RX ADMIN — BUDESONIDE 0.5 MG: 0.5 SUSPENSION RESPIRATORY (INHALATION) at 18:25

## 2021-01-01 RX ADMIN — FAMOTIDINE 20 MG: 10 INJECTION INTRAVENOUS at 08:10

## 2021-01-01 RX ADMIN — SILDENAFIL 20 MG: 20 TABLET, FILM COATED ORAL at 05:05

## 2021-01-01 RX ADMIN — CLEVIPIDINE 28 MG/HR: 0.5 EMULSION INTRAVENOUS at 08:00

## 2021-01-01 RX ADMIN — POTASSIUM CHLORIDE 10 MEQ: 10 INJECTION, SOLUTION INTRAVENOUS at 08:54

## 2021-01-01 RX ADMIN — HYDRALAZINE HYDROCHLORIDE 100 MG: 25 TABLET, FILM COATED ORAL at 05:06

## 2021-01-01 RX ADMIN — EPOPROSTENOL 50 NG/KG/MIN: 1.5 INJECTION, POWDER, LYOPHILIZED, FOR SOLUTION INTRAVENOUS at 16:26

## 2021-01-01 RX ADMIN — EPOPROSTENOL 50 NG/KG/MIN: 1.5 INJECTION, POWDER, LYOPHILIZED, FOR SOLUTION INTRAVENOUS at 05:11

## 2021-01-01 RX ADMIN — IPRATROPIUM BROMIDE AND ALBUTEROL SULFATE 3 ML: 2.5; .5 SOLUTION RESPIRATORY (INHALATION) at 18:50

## 2021-01-01 RX ADMIN — SODIUM CHLORIDE 15 MG/HR: 0.9 INJECTION, SOLUTION INTRAVENOUS at 11:15

## 2021-01-01 RX ADMIN — FOLIC ACID 1 MG: 1 TABLET ORAL at 08:58

## 2021-01-01 RX ADMIN — THERA TABS 1 TABLET: TAB at 08:05

## 2021-01-01 RX ADMIN — CLEVIPIDINE 25 MG/HR: 0.5 EMULSION INTRAVENOUS at 21:14

## 2021-01-01 RX ADMIN — Medication 3 ML: at 20:48

## 2021-01-01 RX ADMIN — SILDENAFIL 20 MG: 20 TABLET, FILM COATED ORAL at 22:53

## 2021-01-01 RX ADMIN — NICARDIPINE HYDROCHLORIDE 15 MG/HR: 25 INJECTION, SOLUTION INTRAVENOUS at 14:12

## 2021-01-01 RX ADMIN — PIPERACILLIN SODIUM AND TAZOBACTAM SODIUM 4.5 G: 4; .5 INJECTION, POWDER, LYOPHILIZED, FOR SOLUTION INTRAVENOUS at 11:50

## 2021-01-01 RX ADMIN — Medication 1200 MG: at 20:47

## 2021-01-01 RX ADMIN — CLEVIPIDINE 21 MG/HR: 0.5 EMULSION INTRAVENOUS at 17:07

## 2021-01-01 RX ADMIN — BUDESONIDE 0.5 MG: 0.5 SUSPENSION RESPIRATORY (INHALATION) at 06:46

## 2021-01-01 RX ADMIN — ASCORBIC ACID, VITAMIN A PALMITATE, CHOLECALCIFEROL, THIAMINE HYDROCHLORIDE, RIBOFLAVIN-5 PHOSPHATE SODIUM, PYRIDOXINE HYDROCHLORIDE, NIACINAMIDE, DEXPANTHENOL, ALPHA-TOCOPHEROL ACETATE, VITAMIN K1, FOLIC ACID, BIOTIN, CYANOCOBALAMIN: 200; 3300; 200; 6; 3.6; 6; 40; 15; 10; 150; 600; 60; 5 INJECTION, SOLUTION INTRAVENOUS at 18:34

## 2021-01-01 RX ADMIN — ARFORMOTEROL TARTRATE 15 MCG: 15 SOLUTION RESPIRATORY (INHALATION) at 06:21

## 2021-01-01 RX ADMIN — IPRATROPIUM BROMIDE AND ALBUTEROL SULFATE 3 ML: 2.5; .5 SOLUTION RESPIRATORY (INHALATION) at 11:09

## 2021-01-01 RX ADMIN — Medication 3 ML: at 22:00

## 2021-01-01 RX ADMIN — TERAZOSIN HYDROCHLORIDE 5 MG: 5 CAPSULE ORAL at 12:42

## 2021-01-01 RX ADMIN — FENTANYL CITRATE 150 MCG/HR: 50 INJECTION, SOLUTION INTRAMUSCULAR; INTRAVENOUS at 12:48

## 2021-01-01 RX ADMIN — FOLIC ACID 1 MG: 1 TABLET ORAL at 09:56

## 2021-01-01 RX ADMIN — FENTANYL CITRATE 200 MCG/HR: 50 INJECTION INTRAVENOUS at 16:35

## 2021-01-01 RX ADMIN — POLYETHYLENE GLYCOL 3350 17 G: 17 POWDER, FOR SOLUTION ORAL at 08:40

## 2021-01-01 RX ADMIN — LORAZEPAM 2 MG: 2 INJECTION INTRAMUSCULAR; INTRAVENOUS at 02:27

## 2021-01-01 RX ADMIN — IPRATROPIUM BROMIDE AND ALBUTEROL SULFATE 3 ML: 2.5; .5 SOLUTION RESPIRATORY (INHALATION) at 06:17

## 2021-01-01 RX ADMIN — PROPOFOL 40 MCG/KG/MIN: 10 INJECTION, EMULSION INTRAVENOUS at 23:23

## 2021-01-01 RX ADMIN — IPRATROPIUM BROMIDE AND ALBUTEROL SULFATE 3 ML: 2.5; .5 SOLUTION RESPIRATORY (INHALATION) at 07:00

## 2021-01-01 RX ADMIN — BUDESONIDE 0.5 MG: 0.5 SUSPENSION RESPIRATORY (INHALATION) at 19:48

## 2021-01-01 RX ADMIN — WATER 10 ML: 1 INJECTION INTRAMUSCULAR; INTRAVENOUS; SUBCUTANEOUS at 03:17

## 2021-01-01 RX ADMIN — LABETALOL 20 MG/4 ML (5 MG/ML) INTRAVENOUS SYRINGE 10 MG: at 18:25

## 2021-01-01 RX ADMIN — METOCLOPRAMIDE 5 MG: 5 INJECTION, SOLUTION INTRAMUSCULAR; INTRAVENOUS at 05:13

## 2021-01-01 RX ADMIN — FAMOTIDINE 20 MG: 20 TABLET, FILM COATED ORAL at 08:58

## 2021-01-01 RX ADMIN — METOCLOPRAMIDE 5 MG: 5 INJECTION, SOLUTION INTRAMUSCULAR; INTRAVENOUS at 14:53

## 2021-01-01 RX ADMIN — FAMOTIDINE 20 MG: 20 TABLET, FILM COATED ORAL at 09:56

## 2021-01-01 RX ADMIN — CLEVIPIDINE 2 MG/HR: 0.5 EMULSION INTRAVENOUS at 10:57

## 2021-01-01 RX ADMIN — IPRATROPIUM BROMIDE AND ALBUTEROL SULFATE 3 ML: 2.5; .5 SOLUTION RESPIRATORY (INHALATION) at 09:25

## 2021-01-01 RX ADMIN — HYDRALAZINE HYDROCHLORIDE 20 MG: 20 INJECTION INTRAMUSCULAR; INTRAVENOUS at 12:45

## 2021-01-01 RX ADMIN — HYDRALAZINE HYDROCHLORIDE 20 MG: 20 INJECTION INTRAMUSCULAR; INTRAVENOUS at 20:59

## 2021-01-01 RX ADMIN — PIPERACILLIN AND TAZOBACTAM 3.38 G: 3; .375 INJECTION, POWDER, LYOPHILIZED, FOR SOLUTION INTRAVENOUS at 03:00

## 2021-01-01 RX ADMIN — PROPOFOL 40 MCG/KG/MIN: 10 INJECTION, EMULSION INTRAVENOUS at 17:17

## 2021-01-01 RX ADMIN — Medication 100 MG: at 08:26

## 2021-01-01 RX ADMIN — DEXMEDETOMIDINE HYDROCHLORIDE 0.5 MCG/KG/HR: 100 INJECTION, SOLUTION INTRAVENOUS at 11:40

## 2021-01-01 RX ADMIN — Medication 3 ML: at 21:15

## 2021-01-01 RX ADMIN — IPRATROPIUM BROMIDE AND ALBUTEROL SULFATE 3 ML: 2.5; .5 SOLUTION RESPIRATORY (INHALATION) at 15:25

## 2021-01-01 RX ADMIN — MIDAZOLAM 6 MG/HR: 5 INJECTION INTRAMUSCULAR; INTRAVENOUS at 16:34

## 2021-01-01 RX ADMIN — BISACODYL 10 MG: 10 SUPPOSITORY RECTAL at 11:09

## 2021-01-01 RX ADMIN — Medication 3 ML: at 20:14

## 2021-01-01 RX ADMIN — POLYETHYLENE GLYCOL 3350 17 G: 17 POWDER, FOR SOLUTION ORAL at 09:01

## 2021-01-01 RX ADMIN — CLEVIPIDINE 25 MG/HR: 0.5 EMULSION INTRAVENOUS at 00:02

## 2021-01-01 RX ADMIN — Medication 3 ML: at 21:00

## 2021-01-01 RX ADMIN — CARVEDILOL 12.5 MG: 6.25 TABLET, FILM COATED ORAL at 08:41

## 2021-01-01 RX ADMIN — RACEPINEPHRINE HYDROCHLORIDE 0.5 ML: 11.25 SOLUTION RESPIRATORY (INHALATION) at 18:59

## 2021-01-01 RX ADMIN — CLEVIPIDINE 10 MG/HR: 0.5 EMULSION INTRAVENOUS at 03:25

## 2021-01-01 RX ADMIN — METHYLPREDNISOLONE SODIUM SUCCINATE 125 MG: 125 INJECTION, POWDER, FOR SOLUTION INTRAMUSCULAR; INTRAVENOUS at 07:02

## 2021-01-01 RX ADMIN — WATER 10 ML: 1 INJECTION INTRAMUSCULAR; INTRAVENOUS; SUBCUTANEOUS at 14:26

## 2021-01-01 RX ADMIN — CLEVIPIDINE 32 MG/HR: 0.5 EMULSION INTRAVENOUS at 10:55

## 2021-01-01 RX ADMIN — BUMETANIDE 1 MG: 0.25 INJECTION, SOLUTION INTRAMUSCULAR; INTRAVENOUS at 21:59

## 2021-01-01 RX ADMIN — SILDENAFIL 20 MG: 20 TABLET, FILM COATED ORAL at 21:59

## 2021-01-01 RX ADMIN — NICARDIPINE HYDROCHLORIDE 15 MG/HR: 25 INJECTION, SOLUTION INTRAVENOUS at 11:39

## 2021-01-01 RX ADMIN — FENTANYL CITRATE 200 MCG/HR: 50 INJECTION, SOLUTION INTRAMUSCULAR; INTRAVENOUS at 23:36

## 2021-01-01 RX ADMIN — CARBOXYMETHYLCELLULOSE SODIUM 1 DROP: 10 GEL OPHTHALMIC at 21:59

## 2021-01-01 RX ADMIN — IPRATROPIUM BROMIDE AND ALBUTEROL SULFATE 3 ML: 2.5; .5 SOLUTION RESPIRATORY (INHALATION) at 18:52

## 2021-01-01 RX ADMIN — EPOPROSTENOL 50 NG/KG/MIN: 1.5 INJECTION, POWDER, LYOPHILIZED, FOR SOLUTION INTRAVENOUS at 12:47

## 2021-01-01 RX ADMIN — METOPROLOL TARTRATE 2.5 MG: 5 INJECTION INTRAVENOUS at 10:56

## 2021-01-01 RX ADMIN — MIDAZOLAM 2 MG: 1 INJECTION INTRAMUSCULAR; INTRAVENOUS at 08:37

## 2021-01-01 RX ADMIN — METHYLPREDNISOLONE SODIUM SUCCINATE 40 MG: 40 INJECTION, POWDER, FOR SOLUTION INTRAMUSCULAR; INTRAVENOUS at 08:37

## 2021-01-01 RX ADMIN — EPOPROSTENOL 50 NG/KG/MIN: 1.5 INJECTION, POWDER, LYOPHILIZED, FOR SOLUTION INTRAVENOUS at 11:25

## 2021-01-01 RX ADMIN — LABETALOL 20 MG/4 ML (5 MG/ML) INTRAVENOUS SYRINGE 2 MG/MIN: at 05:50

## 2021-01-01 RX ADMIN — ARFORMOTEROL TARTRATE 15 MCG: 15 SOLUTION RESPIRATORY (INHALATION) at 18:53

## 2021-01-01 RX ADMIN — BUDESONIDE 0.5 MG: 0.5 SUSPENSION RESPIRATORY (INHALATION) at 18:35

## 2021-01-01 RX ADMIN — FAMOTIDINE 20 MG: 20 TABLET, FILM COATED ORAL at 09:30

## 2021-01-01 RX ADMIN — NITROGLYCERIN 0.4 MG: 0.4 TABLET SUBLINGUAL at 02:43

## 2021-01-01 RX ADMIN — LABETALOL HYDROCHLORIDE 2 MG/MIN: 5 INJECTION INTRAVENOUS at 20:48

## 2021-01-01 RX ADMIN — METOCLOPRAMIDE 5 MG: 5 INJECTION, SOLUTION INTRAMUSCULAR; INTRAVENOUS at 05:12

## 2021-01-01 RX ADMIN — CARBOXYMETHYLCELLULOSE SODIUM 1 DROP: 10 GEL OPHTHALMIC at 20:57

## 2021-01-01 RX ADMIN — PIPERACILLIN SODIUM AND TAZOBACTAM SODIUM 4.5 G: 4; .5 INJECTION, POWDER, LYOPHILIZED, FOR SOLUTION INTRAVENOUS at 08:37

## 2021-01-01 RX ADMIN — SODIUM CHLORIDE 15 MG/HR: 0.9 INJECTION, SOLUTION INTRAVENOUS at 11:53

## 2021-01-01 RX ADMIN — AZITHROMYCIN DIHYDRATE 500 MG: 250 TABLET, FILM COATED ORAL at 18:02

## 2021-01-01 RX ADMIN — BUMETANIDE 2 MG: 0.25 INJECTION, SOLUTION INTRAMUSCULAR; INTRAVENOUS at 14:19

## 2021-01-01 RX ADMIN — ARFORMOTEROL TARTRATE 15 MCG: 15 SOLUTION RESPIRATORY (INHALATION) at 19:01

## 2021-01-01 RX ADMIN — LORAZEPAM 2 MG: 2 INJECTION INTRAMUSCULAR; INTRAVENOUS at 10:56

## 2021-01-01 RX ADMIN — FENTANYL CITRATE 100 MCG/HR: 50 INJECTION INTRAVENOUS at 10:17

## 2021-01-01 RX ADMIN — FOLIC ACID 1 MG: 1 TABLET ORAL at 08:37

## 2021-01-01 RX ADMIN — MIDAZOLAM 7 MG/HR: 5 INJECTION, SOLUTION INTRAMUSCULAR; INTRAVENOUS at 01:06

## 2021-01-01 RX ADMIN — FENTANYL CITRATE 200 MCG/HR: 50 INJECTION INTRAVENOUS at 03:25

## 2021-01-01 RX ADMIN — EPOPROSTENOL 50 NG/KG/MIN: 1.5 INJECTION, POWDER, LYOPHILIZED, FOR SOLUTION INTRAVENOUS at 19:03

## 2021-01-01 RX ADMIN — SILDENAFIL 20 MG: 20 TABLET, FILM COATED ORAL at 14:56

## 2021-01-01 RX ADMIN — FOLIC ACID 1 MG: 1 TABLET ORAL at 09:45

## 2021-01-01 RX ADMIN — MAGNESIUM SULFATE IN DEXTROSE 1 G: 10 INJECTION, SOLUTION INTRAVENOUS at 08:35

## 2021-01-01 RX ADMIN — IPRATROPIUM BROMIDE AND ALBUTEROL SULFATE 3 ML: 2.5; .5 SOLUTION RESPIRATORY (INHALATION) at 19:00

## 2021-01-01 RX ADMIN — ESMOLOL HYDROCHLORIDE 50 MCG/KG/MIN: 10 INJECTION INTRAVENOUS at 20:48

## 2021-01-01 RX ADMIN — VECURONIUM BROMIDE 10 MG: 1 INJECTION, POWDER, LYOPHILIZED, FOR SOLUTION INTRAVENOUS at 14:21

## 2021-01-01 RX ADMIN — PIPERACILLIN AND TAZOBACTAM 3.38 G: 3; .375 INJECTION, POWDER, LYOPHILIZED, FOR SOLUTION INTRAVENOUS at 03:27

## 2021-01-01 RX ADMIN — LABETALOL 20 MG/4 ML (5 MG/ML) INTRAVENOUS SYRINGE 0.5 MG/MIN: at 17:46

## 2021-01-01 RX ADMIN — DEXMEDETOMIDINE HYDROCHLORIDE 1.5 MCG/KG/HR: 100 INJECTION, SOLUTION INTRAVENOUS at 15:07

## 2021-01-01 RX ADMIN — PROPOFOL 40 MCG/KG/MIN: 10 INJECTION, EMULSION INTRAVENOUS at 14:04

## 2021-01-01 RX ADMIN — CARVEDILOL 6.25 MG: 6.25 TABLET, FILM COATED ORAL at 10:24

## 2021-01-01 RX ADMIN — IPRATROPIUM BROMIDE AND ALBUTEROL SULFATE 3 ML: 2.5; .5 SOLUTION RESPIRATORY (INHALATION) at 06:10

## 2021-01-01 RX ADMIN — FUROSEMIDE 20 MG: 20 INJECTION, SOLUTION INTRAMUSCULAR; INTRAVENOUS at 19:58

## 2021-01-01 RX ADMIN — AMLODIPINE BESYLATE 10 MG: 5 TABLET ORAL at 09:08

## 2021-01-01 RX ADMIN — FAMOTIDINE 20 MG: 20 TABLET, FILM COATED ORAL at 08:22

## 2021-01-01 RX ADMIN — HYDRALAZINE HYDROCHLORIDE 20 MG: 20 INJECTION INTRAMUSCULAR; INTRAVENOUS at 14:31

## 2021-01-01 RX ADMIN — MAGNESIUM SULFATE IN DEXTROSE 1 G: 10 INJECTION, SOLUTION INTRAVENOUS at 20:42

## 2021-01-01 RX ADMIN — PROPOFOL 40 MCG/KG/MIN: 10 INJECTION, EMULSION INTRAVENOUS at 05:51

## 2021-01-01 RX ADMIN — METHYLPREDNISOLONE SODIUM SUCCINATE 125 MG: 125 INJECTION, POWDER, FOR SOLUTION INTRAMUSCULAR; INTRAVENOUS at 21:59

## 2021-01-01 RX ADMIN — AZITHROMYCIN DIHYDRATE 250 MG: 250 TABLET, FILM COATED ORAL at 08:10

## 2021-01-01 RX ADMIN — LABETALOL HYDROCHLORIDE 2 MG/MIN: 5 INJECTION INTRAVENOUS at 03:08

## 2021-01-01 RX ADMIN — FENTANYL CITRATE 200 MCG/HR: 50 INJECTION, SOLUTION INTRAMUSCULAR; INTRAVENOUS at 04:03

## 2021-01-01 RX ADMIN — METHYLPREDNISOLONE SODIUM SUCCINATE 40 MG: 40 INJECTION, POWDER, FOR SOLUTION INTRAMUSCULAR; INTRAVENOUS at 23:06

## 2021-01-01 RX ADMIN — HYDRALAZINE HYDROCHLORIDE 20 MG: 20 INJECTION INTRAMUSCULAR; INTRAVENOUS at 13:50

## 2021-01-01 RX ADMIN — LABETALOL 20 MG/4 ML (5 MG/ML) INTRAVENOUS SYRINGE 10 MG: at 16:49

## 2021-01-01 RX ADMIN — BUMETANIDE 2 MG: 0.25 INJECTION, SOLUTION INTRAMUSCULAR; INTRAVENOUS at 13:50

## 2021-01-01 RX ADMIN — FENTANYL CITRATE 100 MCG/HR: 50 INJECTION INTRAVENOUS at 18:00

## 2021-01-01 RX ADMIN — LABETALOL 20 MG/4 ML (5 MG/ML) INTRAVENOUS SYRINGE 1 MG/MIN: at 15:55

## 2021-01-01 RX ADMIN — SODIUM CHLORIDE 15 MG/HR: 0.9 INJECTION, SOLUTION INTRAVENOUS at 04:26

## 2021-01-01 RX ADMIN — AMLODIPINE BESYLATE 10 MG: 5 TABLET ORAL at 08:37

## 2021-01-01 RX ADMIN — IPRATROPIUM BROMIDE AND ALBUTEROL SULFATE 3 ML: 2.5; .5 SOLUTION RESPIRATORY (INHALATION) at 22:55

## 2021-01-01 RX ADMIN — METOCLOPRAMIDE 5 MG: 5 INJECTION, SOLUTION INTRAMUSCULAR; INTRAVENOUS at 20:18

## 2021-01-01 RX ADMIN — PROPOFOL 40 MCG/KG/MIN: 10 INJECTION, EMULSION INTRAVENOUS at 01:46

## 2021-01-01 RX ADMIN — BUDESONIDE 0.5 MG: 0.5 SUSPENSION RESPIRATORY (INHALATION) at 06:17

## 2021-01-01 RX ADMIN — AMLODIPINE BESYLATE 10 MG: 5 TABLET ORAL at 08:43

## 2021-01-01 RX ADMIN — CHLOROTHIAZIDE SODIUM 250 MG: 500 INJECTION, POWDER, LYOPHILIZED, FOR SOLUTION INTRAVENOUS at 12:41

## 2021-01-01 RX ADMIN — HYDRALAZINE HYDROCHLORIDE 100 MG: 25 TABLET, FILM COATED ORAL at 16:03

## 2021-01-01 RX ADMIN — BUMETANIDE 2 MG: 0.25 INJECTION INTRAMUSCULAR; INTRAVENOUS at 15:32

## 2021-01-01 RX ADMIN — PROPOFOL 40 MCG/KG/MIN: 10 INJECTION, EMULSION INTRAVENOUS at 21:05

## 2021-01-01 RX ADMIN — HYDRALAZINE HYDROCHLORIDE 100 MG: 25 TABLET, FILM COATED ORAL at 21:37

## 2021-01-01 RX ADMIN — BUMETANIDE 1 MG: 0.25 INJECTION, SOLUTION INTRAMUSCULAR; INTRAVENOUS at 03:10

## 2021-01-01 RX ADMIN — METOCLOPRAMIDE 5 MG: 5 INJECTION, SOLUTION INTRAMUSCULAR; INTRAVENOUS at 05:06

## 2021-01-01 RX ADMIN — HYDRALAZINE HYDROCHLORIDE 20 MG: 20 INJECTION INTRAMUSCULAR; INTRAVENOUS at 16:50

## 2021-01-01 RX ADMIN — MIDAZOLAM 10 MG/HR: 5 INJECTION INTRAMUSCULAR; INTRAVENOUS at 09:03

## 2021-01-01 RX ADMIN — POTASSIUM CHLORIDE 10 MEQ: 10 INJECTION, SOLUTION INTRAVENOUS at 14:12

## 2021-01-01 RX ADMIN — AZITHROMYCIN DIHYDRATE 250 MG: 250 TABLET, FILM COATED ORAL at 09:07

## 2021-01-01 RX ADMIN — HYDRALAZINE HYDROCHLORIDE 75 MG: 25 TABLET, FILM COATED ORAL at 14:00

## 2021-01-01 RX ADMIN — IPRATROPIUM BROMIDE AND ALBUTEROL SULFATE 3 ML: 2.5; .5 SOLUTION RESPIRATORY (INHALATION) at 11:25

## 2021-01-01 RX ADMIN — THERA TABS 1 TABLET: TAB at 09:31

## 2021-01-01 RX ADMIN — HYDRALAZINE HYDROCHLORIDE 100 MG: 25 TABLET, FILM COATED ORAL at 21:14

## 2021-01-01 RX ADMIN — HYDRALAZINE HYDROCHLORIDE 100 MG: 25 TABLET, FILM COATED ORAL at 13:30

## 2021-01-01 RX ADMIN — HYDRALAZINE HYDROCHLORIDE 100 MG: 25 TABLET, FILM COATED ORAL at 14:48

## 2021-01-01 RX ADMIN — PIPERACILLIN AND TAZOBACTAM 3.38 G: 3; .375 INJECTION, POWDER, LYOPHILIZED, FOR SOLUTION INTRAVENOUS at 21:59

## 2021-01-01 RX ADMIN — HYDRALAZINE HYDROCHLORIDE 100 MG: 25 TABLET, FILM COATED ORAL at 21:31

## 2021-01-01 RX ADMIN — INSULIN LISPRO 6 UNITS: 100 INJECTION, SOLUTION INTRAVENOUS; SUBCUTANEOUS at 01:10

## 2021-01-01 RX ADMIN — DEXMEDETOMIDINE HYDROCHLORIDE 1 MCG/KG/HR: 100 INJECTION, SOLUTION INTRAVENOUS at 19:05

## 2021-01-01 RX ADMIN — CLEVIPIDINE 20 MG/HR: 0.5 EMULSION INTRAVENOUS at 02:59

## 2021-01-01 RX ADMIN — IPRATROPIUM BROMIDE AND ALBUTEROL SULFATE 3 ML: 2.5; .5 SOLUTION RESPIRATORY (INHALATION) at 10:45

## 2021-01-01 RX ADMIN — INSULIN LISPRO 3 UNITS: 100 INJECTION, SOLUTION INTRAVENOUS; SUBCUTANEOUS at 12:15

## 2021-01-01 RX ADMIN — LABETALOL 20 MG/4 ML (5 MG/ML) INTRAVENOUS SYRINGE 1 MG/MIN: at 11:14

## 2021-01-01 RX ADMIN — LABETALOL HYDROCHLORIDE 0.5 MG/MIN: 5 INJECTION INTRAVENOUS at 08:26

## 2021-01-01 RX ADMIN — CLEVIPIDINE 21 MG/HR: 0.5 EMULSION INTRAVENOUS at 20:47

## 2021-01-01 RX ADMIN — POLYETHYLENE GLYCOL 3350 17 G: 17 POWDER, FOR SOLUTION ORAL at 08:38

## 2021-01-01 RX ADMIN — PROPOFOL 50 MCG/KG/MIN: 10 INJECTION, EMULSION INTRAVENOUS at 00:49

## 2021-01-01 RX ADMIN — CLEVIPIDINE 8 MG/HR: 0.5 EMULSION INTRAVENOUS at 08:56

## 2021-01-01 RX ADMIN — SILDENAFIL 20 MG: 20 TABLET, FILM COATED ORAL at 05:13

## 2021-01-01 RX ADMIN — PIPERACILLIN AND TAZOBACTAM 3.38 G: 3; .375 INJECTION, POWDER, LYOPHILIZED, FOR SOLUTION INTRAVENOUS at 11:49

## 2021-01-01 RX ADMIN — EPOPROSTENOL 50 NG/KG/MIN: 1.5 INJECTION, POWDER, LYOPHILIZED, FOR SOLUTION INTRAVENOUS at 07:53

## 2021-01-01 RX ADMIN — METOPROLOL TARTRATE 2.5 MG: 5 INJECTION INTRAVENOUS at 08:04

## 2021-01-01 RX ADMIN — LORAZEPAM 2 MG: 2 INJECTION INTRAMUSCULAR; INTRAVENOUS at 20:21

## 2021-01-01 RX ADMIN — CLEVIPIDINE 32 MG/HR: 0.5 EMULSION INTRAVENOUS at 10:19

## 2021-01-01 RX ADMIN — HYDRALAZINE HYDROCHLORIDE 20 MG: 20 INJECTION INTRAMUSCULAR; INTRAVENOUS at 07:51

## 2021-01-01 RX ADMIN — EPOPROSTENOL 50 NG/KG/MIN: 1.5 INJECTION, POWDER, LYOPHILIZED, FOR SOLUTION INTRAVENOUS at 00:22

## 2021-01-01 RX ADMIN — FENTANYL CITRATE 50 MCG/HR: 50 INJECTION INTRAVENOUS at 18:32

## 2021-01-01 RX ADMIN — EPOPROSTENOL 50 NG/KG/MIN: 1.5 INJECTION, POWDER, LYOPHILIZED, FOR SOLUTION INTRAVENOUS at 14:23

## 2021-01-01 RX ADMIN — EPOPROSTENOL 50 NG/KG/MIN: 1.5 INJECTION, POWDER, LYOPHILIZED, FOR SOLUTION INTRAVENOUS at 09:49

## 2021-01-01 RX ADMIN — INSULIN LISPRO 20 UNITS: 100 INJECTION, SOLUTION INTRAVENOUS; SUBCUTANEOUS at 00:52

## 2021-01-01 RX ADMIN — SODIUM CHLORIDE 5 MG/HR: 0.9 INJECTION, SOLUTION INTRAVENOUS at 05:15

## 2021-01-01 RX ADMIN — METOCLOPRAMIDE 5 MG: 5 INJECTION, SOLUTION INTRAMUSCULAR; INTRAVENOUS at 05:05

## 2021-01-01 RX ADMIN — MORPHINE SULFATE 2 MG: 4 INJECTION, SOLUTION INTRAMUSCULAR; INTRAVENOUS at 05:12

## 2021-01-01 RX ADMIN — ESMOLOL HYDROCHLORIDE 50 MCG/KG/MIN: 10 INJECTION INTRAVENOUS at 04:43

## 2021-01-01 RX ADMIN — METOPROLOL TARTRATE 2.5 MG: 5 INJECTION INTRAVENOUS at 21:18

## 2021-01-01 RX ADMIN — LORAZEPAM 1 MG: 2 INJECTION INTRAMUSCULAR; INTRAVENOUS at 04:15

## 2021-01-01 RX ADMIN — LABETALOL 20 MG/4 ML (5 MG/ML) INTRAVENOUS SYRINGE 0.5 MG/MIN: at 16:46

## 2021-01-01 RX ADMIN — BUDESONIDE 0.5 MG: 0.5 SUSPENSION RESPIRATORY (INHALATION) at 07:36

## 2021-01-01 RX ADMIN — IPRATROPIUM BROMIDE AND ALBUTEROL SULFATE 3 ML: 2.5; .5 SOLUTION RESPIRATORY (INHALATION) at 23:45

## 2021-01-01 RX ADMIN — BUDESONIDE 0.5 MG: 0.5 SUSPENSION RESPIRATORY (INHALATION) at 19:07

## 2021-01-01 RX ADMIN — THERA TABS 1 TABLET: TAB at 08:43

## 2021-01-01 RX ADMIN — BUMETANIDE 1 MG: 0.25 INJECTION, SOLUTION INTRAMUSCULAR; INTRAVENOUS at 23:27

## 2021-01-01 RX ADMIN — FENTANYL CITRATE 50 MCG: 50 INJECTION, SOLUTION INTRAMUSCULAR; INTRAVENOUS at 18:41

## 2021-01-01 RX ADMIN — CLONIDINE 1 PATCH: 0.3 PATCH, EXTENDED RELEASE TRANSDERMAL at 08:00

## 2021-01-01 RX ADMIN — ACETAMINOPHEN 650 MG: 325 TABLET, FILM COATED ORAL at 09:08

## 2021-01-01 RX ADMIN — SILDENAFIL 20 MG: 20 TABLET, FILM COATED ORAL at 14:00

## 2021-01-01 RX ADMIN — HYDRALAZINE HYDROCHLORIDE 100 MG: 25 TABLET, FILM COATED ORAL at 13:02

## 2021-01-01 RX ADMIN — FAMOTIDINE 20 MG: 10 INJECTION INTRAVENOUS at 20:18

## 2021-01-01 RX ADMIN — HYDRALAZINE HYDROCHLORIDE 20 MG: 20 INJECTION INTRAMUSCULAR; INTRAVENOUS at 05:34

## 2021-01-01 RX ADMIN — POTASSIUM CHLORIDE 10 MEQ: 7.46 INJECTION, SOLUTION INTRAVENOUS at 08:09

## 2021-01-01 RX ADMIN — FOLIC ACID 1 MG: 1 TABLET ORAL at 09:30

## 2021-01-01 RX ADMIN — LABETALOL 20 MG/4 ML (5 MG/ML) INTRAVENOUS SYRINGE 1 MG/MIN: at 19:36

## 2021-01-01 RX ADMIN — IPRATROPIUM BROMIDE AND ALBUTEROL SULFATE 3 ML: 2.5; .5 SOLUTION RESPIRATORY (INHALATION) at 07:25

## 2021-01-01 RX ADMIN — POLYETHYLENE GLYCOL 3350 17 G: 17 POWDER, FOR SOLUTION ORAL at 09:56

## 2021-01-01 RX ADMIN — FAMOTIDINE 20 MG: 10 INJECTION INTRAVENOUS at 20:22

## 2021-01-01 RX ADMIN — NICARDIPINE HYDROCHLORIDE 15 MG/HR: 25 INJECTION, SOLUTION INTRAVENOUS at 22:09

## 2021-01-01 RX ADMIN — EPOPROSTENOL 50 NG/KG/MIN: 1.5 INJECTION, POWDER, LYOPHILIZED, FOR SOLUTION INTRAVENOUS at 07:10

## 2021-01-01 RX ADMIN — HYDRALAZINE HYDROCHLORIDE 20 MG: 20 INJECTION INTRAMUSCULAR; INTRAVENOUS at 01:32

## 2021-01-01 RX ADMIN — MIDAZOLAM 8 MG/HR: 5 INJECTION INTRAMUSCULAR; INTRAVENOUS at 22:00

## 2021-01-01 RX ADMIN — SODIUM CHLORIDE 15 MG/HR: 0.9 INJECTION, SOLUTION INTRAVENOUS at 19:56

## 2021-01-01 RX ADMIN — LORAZEPAM 1 MG: 2 INJECTION INTRAMUSCULAR; INTRAVENOUS at 13:49

## 2021-01-01 RX ADMIN — FENTANYL CITRATE 50 MCG: 50 INJECTION, SOLUTION INTRAMUSCULAR; INTRAVENOUS at 07:34

## 2021-01-01 RX ADMIN — Medication 3 ML: at 20:19

## 2021-01-01 RX ADMIN — FOLIC ACID 1 MG: 1 TABLET ORAL at 08:43

## 2021-01-01 RX ADMIN — ESMOLOL HYDROCHLORIDE 125 MCG/KG/MIN: 10 INJECTION INTRAVENOUS at 11:34

## 2021-01-01 RX ADMIN — POTASSIUM CHLORIDE 10 MEQ: 7.46 INJECTION, SOLUTION INTRAVENOUS at 09:19

## 2021-01-01 RX ADMIN — EPOPROSTENOL 50 NG/KG/MIN: 1.5 INJECTION, POWDER, LYOPHILIZED, FOR SOLUTION INTRAVENOUS at 21:15

## 2021-01-01 RX ADMIN — SODIUM CHLORIDE 15 MG/HR: 0.9 INJECTION, SOLUTION INTRAVENOUS at 08:11

## 2021-01-01 RX ADMIN — EPOPROSTENOL 50 NG/KG/MIN: 1.5 INJECTION, POWDER, LYOPHILIZED, FOR SOLUTION INTRAVENOUS at 22:56

## 2021-01-01 RX ADMIN — METOCLOPRAMIDE 5 MG: 5 INJECTION, SOLUTION INTRAMUSCULAR; INTRAVENOUS at 05:25

## 2021-01-01 RX ADMIN — ACETAMINOPHEN 650 MG: 325 TABLET, FILM COATED ORAL at 06:09

## 2021-01-01 RX ADMIN — EPOPROSTENOL 50 NG/KG/MIN: 1.5 INJECTION, POWDER, LYOPHILIZED, FOR SOLUTION INTRAVENOUS at 14:55

## 2021-01-01 RX ADMIN — MIDAZOLAM 2 MG/HR: 5 INJECTION, SOLUTION INTRAMUSCULAR; INTRAVENOUS at 06:27

## 2021-01-01 RX ADMIN — METOCLOPRAMIDE 5 MG: 5 INJECTION, SOLUTION INTRAMUSCULAR; INTRAVENOUS at 22:53

## 2021-01-01 RX ADMIN — DEXMEDETOMIDINE HYDROCHLORIDE 1.5 MCG/KG/HR: 100 INJECTION, SOLUTION INTRAVENOUS at 02:06

## 2021-01-01 RX ADMIN — PIPERACILLIN AND TAZOBACTAM 3.38 G: 3; .375 INJECTION, POWDER, LYOPHILIZED, FOR SOLUTION INTRAVENOUS at 11:36

## 2021-01-01 RX ADMIN — MIDAZOLAM 10 MG/HR: 5 INJECTION INTRAMUSCULAR; INTRAVENOUS at 06:23

## 2021-01-01 RX ADMIN — FENTANYL CITRATE 200 MCG/HR: 50 INJECTION, SOLUTION INTRAMUSCULAR; INTRAVENOUS at 12:54

## 2021-01-01 RX ADMIN — CLEVIPIDINE 10 MG/HR: 0.5 EMULSION INTRAVENOUS at 13:47

## 2021-01-01 RX ADMIN — HYDRALAZINE HYDROCHLORIDE 100 MG: 25 TABLET, FILM COATED ORAL at 21:40

## 2021-01-01 RX ADMIN — HYDRALAZINE HYDROCHLORIDE 100 MG: 25 TABLET, FILM COATED ORAL at 13:48

## 2021-01-01 RX ADMIN — Medication 100 MG: at 08:58

## 2021-01-01 RX ADMIN — FUROSEMIDE 20 MG: 20 INJECTION, SOLUTION INTRAMUSCULAR; INTRAVENOUS at 10:12

## 2021-01-01 RX ADMIN — LORAZEPAM 2 MG: 2 INJECTION INTRAMUSCULAR; INTRAVENOUS at 11:29

## 2021-01-01 RX ADMIN — Medication 1200 MG: at 09:20

## 2021-01-01 RX ADMIN — DEXMEDETOMIDINE HYDROCHLORIDE 1.5 MCG/KG/HR: 100 INJECTION, SOLUTION INTRAVENOUS at 09:46

## 2021-01-01 RX ADMIN — SODIUM CHLORIDE 7.5 MG/HR: 0.9 INJECTION, SOLUTION INTRAVENOUS at 09:13

## 2021-01-01 RX ADMIN — METHYLPREDNISOLONE SODIUM SUCCINATE 125 MG: 125 INJECTION, POWDER, FOR SOLUTION INTRAMUSCULAR; INTRAVENOUS at 06:26

## 2021-01-01 RX ADMIN — INSULIN LISPRO 24 UNITS: 100 INJECTION, SOLUTION INTRAVENOUS; SUBCUTANEOUS at 09:08

## 2021-01-01 RX ADMIN — SODIUM CHLORIDE 5 UNITS/HR: 9 INJECTION, SOLUTION INTRAVENOUS at 21:00

## 2021-01-01 RX ADMIN — MIDAZOLAM 2 MG: 1 INJECTION INTRAMUSCULAR; INTRAVENOUS at 18:40

## 2021-01-01 RX ADMIN — NICARDIPINE HYDROCHLORIDE 15 MG/HR: 25 INJECTION, SOLUTION INTRAVENOUS at 05:47

## 2021-01-01 RX ADMIN — PROPOFOL 30 MCG/KG/MIN: 10 INJECTION, EMULSION INTRAVENOUS at 10:27

## 2021-01-01 RX ADMIN — FENTANYL CITRATE 100 MCG/HR: 50 INJECTION INTRAVENOUS at 01:59

## 2021-01-01 RX ADMIN — DEXMEDETOMIDINE HYDROCHLORIDE 1.5 MCG/KG/HR: 100 INJECTION, SOLUTION INTRAVENOUS at 04:27

## 2021-01-01 RX ADMIN — SODIUM CHLORIDE 23 UNITS/HR: 9 INJECTION, SOLUTION INTRAVENOUS at 19:40

## 2021-01-01 RX ADMIN — Medication 100 MG: at 09:30

## 2021-01-01 RX ADMIN — BUDESONIDE 0.5 MG: 0.5 SUSPENSION RESPIRATORY (INHALATION) at 19:26

## 2021-01-01 RX ADMIN — SILDENAFIL 20 MG: 20 TABLET, FILM COATED ORAL at 14:53

## 2021-01-01 RX ADMIN — SODIUM CHLORIDE 15 MG/HR: 0.9 INJECTION, SOLUTION INTRAVENOUS at 15:17

## 2021-01-01 RX ADMIN — FUROSEMIDE 20 MG: 10 INJECTION, SOLUTION INTRAMUSCULAR; INTRAVENOUS at 08:58

## 2021-01-01 RX ADMIN — MORPHINE SULFATE 2 MG: 4 INJECTION INTRAVENOUS at 05:17

## 2021-01-01 RX ADMIN — PROPOFOL 40 MCG/KG/MIN: 10 INJECTION, EMULSION INTRAVENOUS at 06:13

## 2021-01-01 RX ADMIN — THIAMINE HYDROCHLORIDE 100 MG: 100 INJECTION, SOLUTION INTRAMUSCULAR; INTRAVENOUS at 13:04

## 2021-01-01 RX ADMIN — BUMETANIDE 1 MG: 0.25 INJECTION, SOLUTION INTRAMUSCULAR; INTRAVENOUS at 08:40

## 2021-01-01 RX ADMIN — Medication 3 ML: at 08:10

## 2021-01-01 RX ADMIN — NITROGLYCERIN 70 MCG/MIN: 20 INJECTION INTRAVENOUS at 20:19

## 2021-01-01 RX ADMIN — Medication 3 ML: at 09:02

## 2021-01-01 RX ADMIN — SILDENAFIL 20 MG: 20 TABLET, FILM COATED ORAL at 13:30

## 2021-01-01 RX ADMIN — BUMETANIDE 2 MG: 0.25 INJECTION, SOLUTION INTRAMUSCULAR; INTRAVENOUS at 15:32

## 2021-01-01 RX ADMIN — METHYLPREDNISOLONE SODIUM SUCCINATE 40 MG: 40 INJECTION, POWDER, FOR SOLUTION INTRAMUSCULAR; INTRAVENOUS at 18:00

## 2021-01-01 RX ADMIN — NICARDIPINE HYDROCHLORIDE 15 MG/HR: 25 INJECTION, SOLUTION INTRAVENOUS at 08:02

## 2021-01-01 RX ADMIN — THERA TABS 1 TABLET: TAB at 08:40

## 2021-01-01 RX ADMIN — NITROGLYCERIN 0.4 MG: 0.4 TABLET SUBLINGUAL at 02:27

## 2021-01-01 RX ADMIN — MIDAZOLAM 6 MG/HR: 5 INJECTION INTRAMUSCULAR; INTRAVENOUS at 11:52

## 2021-01-01 RX ADMIN — THERA TABS 1 TABLET: TAB at 08:22

## 2021-01-01 RX ADMIN — IPRATROPIUM BROMIDE AND ALBUTEROL SULFATE 3 ML: 2.5; .5 SOLUTION RESPIRATORY (INHALATION) at 03:37

## 2021-01-01 RX ADMIN — LIDOCAINE: 50 OINTMENT TOPICAL at 09:00

## 2021-01-01 RX ADMIN — CALCIUM GLUCONATE: 98 INJECTION, SOLUTION INTRAVENOUS at 17:56

## 2021-01-01 RX ADMIN — INSULIN LISPRO 2 UNITS: 100 INJECTION, SOLUTION INTRAVENOUS; SUBCUTANEOUS at 12:01

## 2021-01-01 RX ADMIN — IRON SUCROSE 100 MG: 20 INJECTION, SOLUTION INTRAVENOUS at 13:56

## 2021-01-01 RX ADMIN — Medication 3 ML: at 08:38

## 2021-01-01 RX ADMIN — FENTANYL CITRATE 100 MCG/HR: 50 INJECTION INTRAVENOUS at 17:46

## 2021-01-01 RX ADMIN — FENTANYL CITRATE 200 MCG/HR: 50 INJECTION, SOLUTION INTRAMUSCULAR; INTRAVENOUS at 07:02

## 2021-01-01 RX ADMIN — METOCLOPRAMIDE 5 MG: 5 INJECTION, SOLUTION INTRAMUSCULAR; INTRAVENOUS at 14:54

## 2021-01-01 RX ADMIN — FUROSEMIDE 20 MG: 20 INJECTION, SOLUTION INTRAMUSCULAR; INTRAVENOUS at 12:39

## 2021-01-01 RX ADMIN — MIDAZOLAM 6 MG/HR: 5 INJECTION, SOLUTION INTRAMUSCULAR; INTRAVENOUS at 00:51

## 2021-01-01 RX ADMIN — SODIUM CHLORIDE 100 ML/HR: 9 INJECTION, SOLUTION INTRAVENOUS at 21:16

## 2021-01-01 RX ADMIN — METHYLPREDNISOLONE SODIUM SUCCINATE 125 MG: 125 INJECTION, POWDER, FOR SOLUTION INTRAMUSCULAR; INTRAVENOUS at 06:33

## 2021-01-01 RX ADMIN — THIAMINE HYDROCHLORIDE 50 ML/HR: 100 INJECTION, SOLUTION INTRAMUSCULAR; INTRAVENOUS at 12:16

## 2021-01-01 RX ADMIN — NICARDIPINE HYDROCHLORIDE 15 MG/HR: 25 INJECTION, SOLUTION INTRAVENOUS at 10:02

## 2021-01-01 RX ADMIN — PROPOFOL 100 MG: 10 INJECTION, EMULSION INTRAVENOUS at 11:26

## 2021-01-01 RX ADMIN — CARVEDILOL 12.5 MG: 6.25 TABLET, FILM COATED ORAL at 17:04

## 2021-01-01 RX ADMIN — METHYLNALTREXONE BROMIDE 6 MG: 12 INJECTION, SOLUTION SUBCUTANEOUS at 10:33

## 2021-01-01 RX ADMIN — MIDAZOLAM 2 MG: 1 INJECTION INTRAMUSCULAR; INTRAVENOUS at 10:30

## 2021-01-01 RX ADMIN — LIDOCAINE HYDROCHLORIDE: 20 INJECTION, SOLUTION EPIDURAL; INFILTRATION; INTRACAUDAL; PERINEURAL at 09:00

## 2021-01-01 RX ADMIN — CLEVIPIDINE 20 MG/HR: 0.5 EMULSION INTRAVENOUS at 05:33

## 2021-01-01 RX ADMIN — LABETALOL 20 MG/4 ML (5 MG/ML) INTRAVENOUS SYRINGE 1 MG/MIN: at 15:36

## 2021-01-01 RX ADMIN — AMLODIPINE BESYLATE 10 MG: 5 TABLET ORAL at 08:41

## 2021-01-01 RX ADMIN — Medication 3 ML: at 08:41

## 2021-01-01 RX ADMIN — SODIUM CHLORIDE 5 MG/HR: 0.9 INJECTION, SOLUTION INTRAVENOUS at 20:08

## 2021-01-01 RX ADMIN — SILDENAFIL 20 MG: 20 TABLET, FILM COATED ORAL at 21:00

## 2021-01-01 RX ADMIN — Medication 3 ML: at 20:28

## 2021-01-01 RX ADMIN — SODIUM CHLORIDE 100 ML/HR: 9 INJECTION, SOLUTION INTRAVENOUS at 10:33

## 2021-01-01 RX ADMIN — CEFTRIAXONE SODIUM 2 G: 2 INJECTION, POWDER, FOR SOLUTION INTRAMUSCULAR; INTRAVENOUS at 11:41

## 2021-01-01 RX ADMIN — BUMETANIDE 1 MG: 0.25 INJECTION, SOLUTION INTRAMUSCULAR; INTRAVENOUS at 18:30

## 2021-01-01 RX ADMIN — POTASSIUM CHLORIDE 10 MEQ: 7.46 INJECTION, SOLUTION INTRAVENOUS at 12:17

## 2021-01-01 RX ADMIN — PROPOFOL 50 MCG/KG/MIN: 10 INJECTION, EMULSION INTRAVENOUS at 06:49

## 2021-01-01 RX ADMIN — FENTANYL CITRATE 100 MCG/HR: 50 INJECTION INTRAVENOUS at 19:39

## 2021-01-01 RX ADMIN — FENTANYL CITRATE 200 MCG/HR: 50 INJECTION, SOLUTION INTRAMUSCULAR; INTRAVENOUS at 17:31

## 2021-01-01 RX ADMIN — LIDOCAINE HYDROCHLORIDE: 20 JELLY TOPICAL at 09:00

## 2021-01-01 RX ADMIN — HYDRALAZINE HYDROCHLORIDE 20 MG: 20 INJECTION INTRAMUSCULAR; INTRAVENOUS at 01:51

## 2021-01-01 RX ADMIN — AMLODIPINE BESYLATE 10 MG: 5 TABLET ORAL at 11:24

## 2021-01-01 RX ADMIN — HYDRALAZINE HYDROCHLORIDE 20 MG: 20 INJECTION INTRAMUSCULAR; INTRAVENOUS at 06:36

## 2021-01-01 RX ADMIN — METOCLOPRAMIDE 5 MG: 5 INJECTION, SOLUTION INTRAMUSCULAR; INTRAVENOUS at 21:59

## 2021-01-01 RX ADMIN — FUROSEMIDE 80 MG: 10 INJECTION, SOLUTION INTRAMUSCULAR; INTRAVENOUS at 14:47

## 2021-01-01 RX ADMIN — METOCLOPRAMIDE 5 MG: 5 INJECTION, SOLUTION INTRAMUSCULAR; INTRAVENOUS at 13:03

## 2021-01-01 RX ADMIN — ARFORMOTEROL TARTRATE 15 MCG: 15 SOLUTION RESPIRATORY (INHALATION) at 06:15

## 2021-01-01 RX ADMIN — HYDRALAZINE HYDROCHLORIDE 75 MG: 25 TABLET, FILM COATED ORAL at 14:31

## 2021-01-01 RX ADMIN — METHYLPREDNISOLONE SODIUM SUCCINATE 125 MG: 125 INJECTION, POWDER, FOR SOLUTION INTRAMUSCULAR; INTRAVENOUS at 12:55

## 2021-01-01 RX ADMIN — DEXMEDETOMIDINE HYDROCHLORIDE 1 MCG/KG/HR: 100 INJECTION, SOLUTION INTRAVENOUS at 15:51

## 2021-01-01 RX ADMIN — MIDAZOLAM 4 MG/HR: 5 INJECTION, SOLUTION INTRAMUSCULAR; INTRAVENOUS at 16:30

## 2021-01-01 RX ADMIN — PIPERACILLIN AND TAZOBACTAM 3.38 G: 3; .375 INJECTION, POWDER, LYOPHILIZED, FOR SOLUTION INTRAVENOUS at 20:30

## 2021-01-01 RX ADMIN — BUDESONIDE 0.5 MG: 0.5 SUSPENSION RESPIRATORY (INHALATION) at 18:23

## 2021-01-01 RX ADMIN — INSULIN LISPRO 16 UNITS: 100 INJECTION, SOLUTION INTRAVENOUS; SUBCUTANEOUS at 18:24

## 2021-01-01 RX ADMIN — HYDRALAZINE HYDROCHLORIDE 20 MG: 20 INJECTION INTRAMUSCULAR; INTRAVENOUS at 16:56

## 2021-01-01 RX ADMIN — CARVEDILOL 12.5 MG: 6.25 TABLET, FILM COATED ORAL at 17:31

## 2021-01-01 RX ADMIN — FAMOTIDINE 20 MG: 10 INJECTION INTRAVENOUS at 09:04

## 2021-01-01 RX ADMIN — LABETALOL 20 MG/4 ML (5 MG/ML) INTRAVENOUS SYRINGE 0.5 MG/MIN: at 11:39

## 2021-01-01 RX ADMIN — SILDENAFIL 20 MG: 20 TABLET, FILM COATED ORAL at 05:25

## 2021-01-01 RX ADMIN — Medication 100 MG: at 14:47

## 2021-01-01 RX ADMIN — METOCLOPRAMIDE 5 MG: 5 INJECTION, SOLUTION INTRAMUSCULAR; INTRAVENOUS at 22:21

## 2021-01-01 RX ADMIN — EPOPROSTENOL 50 NG/KG/MIN: 1.5 INJECTION, POWDER, LYOPHILIZED, FOR SOLUTION INTRAVENOUS at 02:47

## 2021-01-01 RX ADMIN — BUDESONIDE 0.5 MG: 0.5 SUSPENSION RESPIRATORY (INHALATION) at 06:52

## 2021-01-01 RX ADMIN — FAMOTIDINE 20 MG: 20 TABLET, FILM COATED ORAL at 08:40

## 2021-01-01 RX ADMIN — SILDENAFIL 20 MG: 20 TABLET, FILM COATED ORAL at 05:36

## 2021-01-01 RX ADMIN — SODIUM CHLORIDE 15 MG/HR: 0.9 INJECTION, SOLUTION INTRAVENOUS at 03:27

## 2021-01-01 RX ADMIN — IRON SUCROSE 100 MG: 20 INJECTION, SOLUTION INTRAVENOUS at 12:26

## 2021-01-01 RX ADMIN — PROPOFOL 40 MCG/KG/MIN: 10 INJECTION, EMULSION INTRAVENOUS at 17:44

## 2021-01-01 RX ADMIN — POLYETHYLENE GLYCOL 3350 17 G: 17 POWDER, FOR SOLUTION ORAL at 08:43

## 2021-01-01 RX ADMIN — EPOPROSTENOL 50 NG/KG/MIN: 1.5 INJECTION, POWDER, LYOPHILIZED, FOR SOLUTION INTRAVENOUS at 20:49

## 2021-01-01 RX ADMIN — EPINEPHRINE: 0.1 INJECTION, SOLUTION ENDOTRACHEAL; INTRACARDIAC; INTRAVENOUS at 12:44

## 2021-01-01 RX ADMIN — LORAZEPAM 2 MG: 2 INJECTION INTRAMUSCULAR; INTRAVENOUS at 12:16

## 2021-01-01 RX ADMIN — HYDRALAZINE HYDROCHLORIDE 100 MG: 25 TABLET, FILM COATED ORAL at 21:59

## 2021-01-01 RX ADMIN — HYDRALAZINE HYDROCHLORIDE 20 MG: 20 INJECTION INTRAMUSCULAR; INTRAVENOUS at 23:06

## 2021-01-01 RX ADMIN — METOCLOPRAMIDE 5 MG: 5 INJECTION, SOLUTION INTRAMUSCULAR; INTRAVENOUS at 21:14

## 2021-01-01 RX ADMIN — HYDRALAZINE HYDROCHLORIDE 100 MG: 25 TABLET, FILM COATED ORAL at 20:24

## 2021-01-01 RX ADMIN — IPRATROPIUM BROMIDE AND ALBUTEROL SULFATE 3 ML: 2.5; .5 SOLUTION RESPIRATORY (INHALATION) at 19:42

## 2021-01-01 RX ADMIN — Medication 100 MG: at 08:05

## 2021-01-01 RX ADMIN — FENTANYL CITRATE 200 MCG/HR: 50 INJECTION INTRAVENOUS at 11:46

## 2021-01-01 RX ADMIN — METHYLPREDNISOLONE SODIUM SUCCINATE 125 MG: 125 INJECTION, POWDER, FOR SOLUTION INTRAMUSCULAR; INTRAVENOUS at 14:54

## 2021-01-01 RX ADMIN — HYDRALAZINE HYDROCHLORIDE 20 MG: 20 INJECTION INTRAMUSCULAR; INTRAVENOUS at 23:40

## 2021-01-01 RX ADMIN — WATER 10 ML: 1 INJECTION INTRAMUSCULAR; INTRAVENOUS; SUBCUTANEOUS at 17:26

## 2021-01-01 RX ADMIN — Medication 3 ML: at 20:52

## 2021-01-01 RX ADMIN — IPRATROPIUM BROMIDE AND ALBUTEROL SULFATE 3 ML: 2.5; .5 SOLUTION RESPIRATORY (INHALATION) at 23:34

## 2021-01-01 RX ADMIN — BUMETANIDE 1 MG: 0.25 INJECTION, SOLUTION INTRAMUSCULAR; INTRAVENOUS at 12:04

## 2021-01-01 RX ADMIN — PROPOFOL 50 MCG/KG/MIN: 10 INJECTION, EMULSION INTRAVENOUS at 03:55

## 2021-01-01 RX ADMIN — IPRATROPIUM BROMIDE AND ALBUTEROL SULFATE 3 ML: 2.5; .5 SOLUTION RESPIRATORY (INHALATION) at 15:17

## 2021-01-01 RX ADMIN — FAMOTIDINE 20 MG: 20 TABLET, FILM COATED ORAL at 09:07

## 2021-01-01 RX ADMIN — CLEVIPIDINE 16 MG/HR: 0.5 EMULSION INTRAVENOUS at 23:27

## 2021-01-01 RX ADMIN — MIDAZOLAM 7 MG/HR: 5 INJECTION, SOLUTION INTRAMUSCULAR; INTRAVENOUS at 13:33

## 2021-01-01 RX ADMIN — HYDRALAZINE HYDROCHLORIDE 20 MG: 20 INJECTION INTRAMUSCULAR; INTRAVENOUS at 17:49

## 2021-01-01 RX ADMIN — HYDRALAZINE HYDROCHLORIDE 100 MG: 25 TABLET, FILM COATED ORAL at 13:59

## 2021-01-01 RX ADMIN — CARVEDILOL 12.5 MG: 6.25 TABLET, FILM COATED ORAL at 18:01

## 2021-01-01 RX ADMIN — SILDENAFIL 20 MG: 20 TABLET, FILM COATED ORAL at 13:03

## 2021-01-01 RX ADMIN — METOCLOPRAMIDE 5 MG: 5 INJECTION, SOLUTION INTRAMUSCULAR; INTRAVENOUS at 21:40

## 2021-01-01 RX ADMIN — INSULIN LISPRO 20 UNITS: 100 INJECTION, SOLUTION INTRAVENOUS; SUBCUTANEOUS at 12:56

## 2021-01-01 RX ADMIN — SILDENAFIL 20 MG: 20 TABLET, FILM COATED ORAL at 23:23

## 2021-01-01 RX ADMIN — AMLODIPINE BESYLATE 10 MG: 5 TABLET ORAL at 09:44

## 2021-01-01 RX ADMIN — IPRATROPIUM BROMIDE AND ALBUTEROL SULFATE 3 ML: 2.5; .5 SOLUTION RESPIRATORY (INHALATION) at 06:05

## 2021-01-01 RX ADMIN — BUMETANIDE 2 MG: 0.25 INJECTION, SOLUTION INTRAMUSCULAR; INTRAVENOUS at 01:58

## 2021-01-01 RX ADMIN — IPRATROPIUM BROMIDE AND ALBUTEROL SULFATE 3 ML: 2.5; .5 SOLUTION RESPIRATORY (INHALATION) at 10:38

## 2021-01-01 RX ADMIN — PROPOFOL 40 MCG/KG/MIN: 10 INJECTION, EMULSION INTRAVENOUS at 02:04

## 2021-01-01 RX ADMIN — ARFORMOTEROL TARTRATE 15 MCG: 15 SOLUTION RESPIRATORY (INHALATION) at 19:20

## 2021-01-01 RX ADMIN — HYDRALAZINE HYDROCHLORIDE 100 MG: 25 TABLET, FILM COATED ORAL at 05:19

## 2021-01-01 RX ADMIN — LEUCINE, PHENYLALANINE, LYSINE, METHIONINE, ISOLEUCINE, VALINE, HISTIDINE, THREONINE, TRYPTOPHAN, ALANINE, GLYCINE, ARGININE, PROLINE, SERINE, TYROSINE, DEXTROSE: 365; 280; 290; 200; 300; 290; 240; 210; 90; 1035; 515; 575; 340; 250; 20; 20 INJECTION INTRAVENOUS at 18:19

## 2021-01-01 RX ADMIN — POTASSIUM CHLORIDE 20 MEQ: 1.5 POWDER, FOR SOLUTION ORAL at 08:10

## 2021-01-01 RX ADMIN — BUDESONIDE 0.5 MG: 0.5 SUSPENSION RESPIRATORY (INHALATION) at 18:55

## 2021-01-01 RX ADMIN — METOPROLOL TARTRATE 25 MG: 25 TABLET, FILM COATED ORAL at 08:33

## 2021-01-01 RX ADMIN — FENTANYL CITRATE 150 MCG/HR: 50 INJECTION INTRAVENOUS at 11:31

## 2021-01-01 RX ADMIN — BUMETANIDE 1 MG: 0.25 INJECTION, SOLUTION INTRAMUSCULAR; INTRAVENOUS at 15:55

## 2021-01-01 RX ADMIN — BUDESONIDE 0.5 MG: 0.5 SUSPENSION RESPIRATORY (INHALATION) at 18:12

## 2021-01-01 RX ADMIN — Medication 3 ML: at 20:47

## 2021-01-01 RX ADMIN — METOCLOPRAMIDE 5 MG: 5 INJECTION, SOLUTION INTRAMUSCULAR; INTRAVENOUS at 21:00

## 2021-01-01 RX ADMIN — Medication 1200 MG: at 22:08

## 2021-01-01 RX ADMIN — Medication 100 MG: at 09:44

## 2021-01-01 RX ADMIN — NICARDIPINE HYDROCHLORIDE 15 MG/HR: 25 INJECTION, SOLUTION INTRAVENOUS at 16:25

## 2021-01-01 RX ADMIN — BUDESONIDE 0.5 MG: 0.5 SUSPENSION RESPIRATORY (INHALATION) at 18:45

## 2021-01-01 RX ADMIN — DEXMEDETOMIDINE HYDROCHLORIDE 1.5 MCG/KG/HR: 100 INJECTION, SOLUTION INTRAVENOUS at 23:47

## 2021-01-01 RX ADMIN — HYDRALAZINE HYDROCHLORIDE 100 MG: 25 TABLET, FILM COATED ORAL at 22:21

## 2021-01-01 RX ADMIN — FOLIC ACID 1 MG: 1 TABLET ORAL at 08:22

## 2021-01-01 RX ADMIN — FUROSEMIDE 20 MG: 10 INJECTION, SOLUTION INTRAMUSCULAR; INTRAVENOUS at 14:50

## 2021-01-01 RX ADMIN — ALTEPLASE: 2.2 INJECTION, POWDER, LYOPHILIZED, FOR SOLUTION INTRAVENOUS at 10:27

## 2021-01-01 RX ADMIN — INSULIN LISPRO 3 UNITS: 100 INJECTION, SOLUTION INTRAVENOUS; SUBCUTANEOUS at 06:09

## 2021-01-01 RX ADMIN — WATER 10 ML: 1 INJECTION INTRAMUSCULAR; INTRAVENOUS; SUBCUTANEOUS at 15:03

## 2021-01-01 RX ADMIN — SODIUM CHLORIDE 32 UNITS/HR: 9 INJECTION, SOLUTION INTRAVENOUS at 02:56

## 2021-01-01 RX ADMIN — Medication 3 ML: at 09:57

## 2021-01-01 RX ADMIN — ALBUMIN HUMAN 25 G: 0.25 SOLUTION INTRAVENOUS at 17:50

## 2021-01-01 RX ADMIN — VECURONIUM BROMIDE 10 MG: 1 INJECTION, POWDER, LYOPHILIZED, FOR SOLUTION INTRAVENOUS at 17:44

## 2021-01-01 RX ADMIN — AMLODIPINE BESYLATE 10 MG: 5 TABLET ORAL at 09:31

## 2021-01-01 RX ADMIN — MIDAZOLAM 10 MG/HR: 5 INJECTION, SOLUTION INTRAMUSCULAR; INTRAVENOUS at 14:11

## 2021-01-01 RX ADMIN — INSULIN LISPRO 3 UNITS: 100 INJECTION, SOLUTION INTRAVENOUS; SUBCUTANEOUS at 17:44

## 2021-01-01 RX ADMIN — ETOMIDATE 20 MG: 40 INJECTION, SOLUTION INTRAVENOUS at 06:01

## 2021-01-01 RX ADMIN — BUDESONIDE 0.5 MG: 0.5 SUSPENSION RESPIRATORY (INHALATION) at 06:10

## 2021-01-01 RX ADMIN — MIDAZOLAM 1 MG/HR: 5 INJECTION INTRAMUSCULAR; INTRAVENOUS at 07:55

## 2021-01-01 RX ADMIN — LABETALOL HYDROCHLORIDE 1 MG/MIN: 5 INJECTION INTRAVENOUS at 11:29

## 2021-01-01 RX ADMIN — FENTANYL CITRATE 200 MCG/HR: 50 INJECTION INTRAVENOUS at 20:48

## 2021-01-01 RX ADMIN — PIPERACILLIN AND TAZOBACTAM 3.38 G: 3; .375 INJECTION, POWDER, LYOPHILIZED, FOR SOLUTION INTRAVENOUS at 14:54

## 2021-01-01 RX ADMIN — LABETALOL 20 MG/4 ML (5 MG/ML) INTRAVENOUS SYRINGE 1.5 MG/MIN: at 22:35

## 2021-01-01 RX ADMIN — DEXMEDETOMIDINE HYDROCHLORIDE 1 MCG/KG/HR: 100 INJECTION, SOLUTION INTRAVENOUS at 11:34

## 2021-01-01 RX ADMIN — HYDRALAZINE HYDROCHLORIDE 100 MG: 25 TABLET, FILM COATED ORAL at 22:53

## 2021-01-01 RX ADMIN — PROPOFOL 40 MCG/KG/MIN: 10 INJECTION, EMULSION INTRAVENOUS at 20:47

## 2021-01-01 RX ADMIN — IPRATROPIUM BROMIDE AND ALBUTEROL SULFATE 3 ML: 2.5; .5 SOLUTION RESPIRATORY (INHALATION) at 10:56

## 2021-01-01 RX ADMIN — Medication 3 ML: at 08:05

## 2021-01-01 RX ADMIN — Medication 3 ML: at 20:25

## 2021-01-01 RX ADMIN — EPOPROSTENOL 50 NG/KG/MIN: 1.5 INJECTION, POWDER, LYOPHILIZED, FOR SOLUTION INTRAVENOUS at 03:51

## 2021-01-01 RX ADMIN — LABETALOL 20 MG/4 ML (5 MG/ML) INTRAVENOUS SYRINGE 2 MG/MIN: at 09:12

## 2021-01-01 RX ADMIN — CEFTRIAXONE SODIUM 2 G: 2 INJECTION, POWDER, FOR SOLUTION INTRAMUSCULAR; INTRAVENOUS at 11:36

## 2021-01-01 RX ADMIN — HYDRALAZINE HYDROCHLORIDE 100 MG: 25 TABLET, FILM COATED ORAL at 05:05

## 2021-01-01 RX ADMIN — BUDESONIDE 0.5 MG: 0.5 SUSPENSION RESPIRATORY (INHALATION) at 18:57

## 2021-01-01 RX ADMIN — Medication 3 ML: at 09:08

## 2021-01-01 RX ADMIN — INSULIN LISPRO 24 UNITS: 100 INJECTION, SOLUTION INTRAVENOUS; SUBCUTANEOUS at 07:23

## 2021-01-01 RX ADMIN — FAMOTIDINE 20 MG: 20 TABLET, FILM COATED ORAL at 08:43

## 2021-01-01 RX ADMIN — SODIUM CHLORIDE 10 UNITS/HR: 9 INJECTION, SOLUTION INTRAVENOUS at 09:20

## 2021-01-01 RX ADMIN — METOCLOPRAMIDE 5 MG: 5 INJECTION, SOLUTION INTRAMUSCULAR; INTRAVENOUS at 14:48

## 2021-01-01 RX ADMIN — FOLIC ACID 1 MG: 1 TABLET ORAL at 13:02

## 2021-01-01 RX ADMIN — SILDENAFIL 20 MG: 20 TABLET, FILM COATED ORAL at 21:31

## 2021-01-01 RX ADMIN — PROPOFOL 40 MCG/KG/MIN: 10 INJECTION, EMULSION INTRAVENOUS at 01:35

## 2021-01-01 RX ADMIN — POLYETHYLENE GLYCOL 3350 17 G: 17 POWDER, FOR SOLUTION ORAL at 12:32

## 2021-01-01 RX ADMIN — MIDAZOLAM 8 MG/HR: 5 INJECTION INTRAMUSCULAR; INTRAVENOUS at 14:36

## 2021-01-01 RX ADMIN — FAMOTIDINE 20 MG: 10 INJECTION INTRAVENOUS at 12:04

## 2021-01-01 RX ADMIN — PIPERACILLIN AND TAZOBACTAM 3.38 G: 3; .375 INJECTION, POWDER, LYOPHILIZED, FOR SOLUTION INTRAVENOUS at 11:59

## 2021-01-01 RX ADMIN — DEXTROSE MONOHYDRATE 75 ML/HR: 50 INJECTION, SOLUTION INTRAVENOUS at 11:08

## 2021-01-01 RX ADMIN — IPRATROPIUM BROMIDE AND ALBUTEROL SULFATE 3 ML: 2.5; .5 SOLUTION RESPIRATORY (INHALATION) at 18:40

## 2021-01-01 RX ADMIN — IPRATROPIUM BROMIDE AND ALBUTEROL SULFATE 3 ML: 2.5; .5 SOLUTION RESPIRATORY (INHALATION) at 06:52

## 2021-01-01 RX ADMIN — HYDRALAZINE HYDROCHLORIDE 20 MG: 20 INJECTION INTRAMUSCULAR; INTRAVENOUS at 21:13

## 2021-01-01 RX ADMIN — LABETALOL 20 MG/4 ML (5 MG/ML) INTRAVENOUS SYRINGE 2 MG/MIN: at 22:03

## 2021-01-01 RX ADMIN — HYDRALAZINE HYDROCHLORIDE 20 MG: 20 INJECTION INTRAMUSCULAR; INTRAVENOUS at 04:45

## 2021-01-01 RX ADMIN — PIPERACILLIN AND TAZOBACTAM 3.38 G: 3; .375 INJECTION, POWDER, LYOPHILIZED, FOR SOLUTION INTRAVENOUS at 05:04

## 2021-01-01 RX ADMIN — LABETALOL 20 MG/4 ML (5 MG/ML) INTRAVENOUS SYRINGE 1 MG/MIN: at 00:59

## 2021-01-01 RX ADMIN — FENTANYL CITRATE 200 MCG/HR: 50 INJECTION INTRAVENOUS at 15:42

## 2021-01-01 RX ADMIN — IPRATROPIUM BROMIDE AND ALBUTEROL SULFATE 3 ML: 2.5; .5 SOLUTION RESPIRATORY (INHALATION) at 15:06

## 2021-01-01 RX ADMIN — EPOPROSTENOL 50 NG/KG/MIN: 1.5 INJECTION, POWDER, LYOPHILIZED, FOR SOLUTION INTRAVENOUS at 13:12

## 2021-01-01 RX ADMIN — LABETALOL 20 MG/4 ML (5 MG/ML) INTRAVENOUS SYRINGE 10 MG: at 06:39

## 2021-01-01 RX ADMIN — POTASSIUM CHLORIDE 10 MEQ: 10 INJECTION, SOLUTION INTRAVENOUS at 10:02

## 2021-01-01 RX ADMIN — PROPOFOL 50 MCG/KG/MIN: 10 INJECTION, EMULSION INTRAVENOUS at 21:09

## 2021-01-01 RX ADMIN — Medication 3 ML: at 23:23

## 2021-01-01 RX ADMIN — CLEVIPIDINE 12 MG/HR: 0.5 EMULSION INTRAVENOUS at 19:34

## 2021-01-01 RX ADMIN — INSULIN LISPRO 24 UNITS: 100 INJECTION, SOLUTION INTRAVENOUS; SUBCUTANEOUS at 04:43

## 2021-01-01 RX ADMIN — SILDENAFIL 20 MG: 20 TABLET, FILM COATED ORAL at 14:48

## 2021-01-01 RX ADMIN — FENTANYL CITRATE 100 MCG/HR: 50 INJECTION INTRAVENOUS at 06:18

## 2021-01-01 RX ADMIN — SODIUM CHLORIDE 15 MG/HR: 0.9 INJECTION, SOLUTION INTRAVENOUS at 00:01

## 2021-01-01 RX ADMIN — CLEVIPIDINE 7 MG/HR: 0.5 EMULSION INTRAVENOUS at 01:49

## 2021-01-01 RX ADMIN — LABETALOL HYDROCHLORIDE 2 MG/MIN: 5 INJECTION INTRAVENOUS at 17:46

## 2021-01-01 RX ADMIN — Medication 100 MG: at 09:07

## 2021-01-01 RX ADMIN — VANCOMYCIN HYDROCHLORIDE 1750 MG: 10 INJECTION, POWDER, LYOPHILIZED, FOR SOLUTION INTRAVENOUS at 13:48

## 2021-01-01 RX ADMIN — HYDRALAZINE HYDROCHLORIDE 75 MG: 25 TABLET, FILM COATED ORAL at 05:36

## 2021-01-01 RX ADMIN — EPOPROSTENOL 50 NG/KG/MIN: 1.5 INJECTION, POWDER, LYOPHILIZED, FOR SOLUTION INTRAVENOUS at 00:27

## 2021-01-01 RX ADMIN — LABETALOL 20 MG/4 ML (5 MG/ML) INTRAVENOUS SYRINGE 1.5 MG/MIN: at 17:00

## 2021-01-01 RX ADMIN — ARFORMOTEROL TARTRATE 15 MCG: 15 SOLUTION RESPIRATORY (INHALATION) at 19:00

## 2021-01-01 RX ADMIN — FOLIC ACID 1 MG: 1 TABLET ORAL at 08:05

## 2021-01-01 RX ADMIN — FENTANYL CITRATE 200 MCG/HR: 50 INJECTION INTRAVENOUS at 22:00

## 2021-01-01 RX ADMIN — VECURONIUM BROMIDE 10 MG: 1 INJECTION, POWDER, LYOPHILIZED, FOR SOLUTION INTRAVENOUS at 11:06

## 2021-01-01 RX ADMIN — LORAZEPAM 1 MG: 2 INJECTION INTRAMUSCULAR; INTRAVENOUS at 17:25

## 2021-01-01 RX ADMIN — HYDRALAZINE HYDROCHLORIDE 100 MG: 25 TABLET, FILM COATED ORAL at 14:40

## 2021-01-01 RX ADMIN — IPRATROPIUM BROMIDE AND ALBUTEROL SULFATE 3 ML: 2.5; .5 SOLUTION RESPIRATORY (INHALATION) at 16:26

## 2021-01-01 RX ADMIN — EPOPROSTENOL 50 NG/KG/MIN: 1.5 INJECTION, POWDER, LYOPHILIZED, FOR SOLUTION INTRAVENOUS at 18:57

## 2021-01-01 RX ADMIN — PIPERACILLIN SODIUM AND TAZOBACTAM SODIUM 4.5 G: 4; .5 INJECTION, POWDER, LYOPHILIZED, FOR SOLUTION INTRAVENOUS at 20:56

## 2021-01-01 RX ADMIN — PROPOFOL 40 MCG/KG/MIN: 10 INJECTION, EMULSION INTRAVENOUS at 17:25

## 2021-01-01 RX ADMIN — HYDRALAZINE HYDROCHLORIDE 100 MG: 25 TABLET, FILM COATED ORAL at 05:25

## 2021-01-01 RX ADMIN — METOCLOPRAMIDE 5 MG: 5 INJECTION, SOLUTION INTRAMUSCULAR; INTRAVENOUS at 14:30

## 2021-01-01 RX ADMIN — IPRATROPIUM BROMIDE AND ALBUTEROL SULFATE 3 ML: 2.5; .5 SOLUTION RESPIRATORY (INHALATION) at 03:55

## 2021-01-01 RX ADMIN — LABETALOL 20 MG/4 ML (5 MG/ML) INTRAVENOUS SYRINGE 2 MG/MIN: at 02:47

## 2021-01-01 RX ADMIN — HYDRALAZINE HYDROCHLORIDE 20 MG: 20 INJECTION INTRAMUSCULAR; INTRAVENOUS at 08:52

## 2021-01-01 RX ADMIN — SODIUM CHLORIDE 15 MG/HR: 0.9 INJECTION, SOLUTION INTRAVENOUS at 14:04

## 2021-01-01 RX ADMIN — ARFORMOTEROL TARTRATE 15 MCG: 15 SOLUTION RESPIRATORY (INHALATION) at 18:25

## 2021-01-01 RX ADMIN — NITROGLYCERIN 0.4 MG: 0.4 TABLET SUBLINGUAL at 02:16

## 2021-01-01 RX ADMIN — IPRATROPIUM BROMIDE AND ALBUTEROL SULFATE 3 ML: 2.5; .5 SOLUTION RESPIRATORY (INHALATION) at 11:19

## 2021-01-01 RX ADMIN — FOLIC ACID 1 MG: 1 TABLET ORAL at 08:40

## 2021-01-01 RX ADMIN — NITROGLYCERIN 20 MCG/MIN: 20 INJECTION INTRAVENOUS at 03:03

## 2021-01-01 RX ADMIN — LABETALOL 20 MG/4 ML (5 MG/ML) INTRAVENOUS SYRINGE 1.5 MG/MIN: at 05:13

## 2021-01-01 RX ADMIN — FOLIC ACID 1 MG: 1 TABLET ORAL at 09:08

## 2021-01-01 RX ADMIN — IPRATROPIUM BROMIDE AND ALBUTEROL SULFATE 3 ML: 2.5; .5 SOLUTION RESPIRATORY (INHALATION) at 15:30

## 2021-01-01 RX ADMIN — WATER 10 ML: 1 INJECTION INTRAMUSCULAR; INTRAVENOUS; SUBCUTANEOUS at 17:44

## 2021-01-01 RX ADMIN — THERA TABS 1 TABLET: TAB at 08:37

## 2021-01-01 RX ADMIN — NICARDIPINE HYDROCHLORIDE 8 MG/HR: 25 INJECTION, SOLUTION INTRAVENOUS at 10:49

## 2021-01-01 RX ADMIN — BUDESONIDE 0.5 MG: 0.5 SUSPENSION RESPIRATORY (INHALATION) at 07:25

## 2021-01-01 RX ADMIN — CLEVIPIDINE 32 MG/HR: 0.5 EMULSION INTRAVENOUS at 19:12

## 2021-01-01 RX ADMIN — DEXTROSE MONOHYDRATE 100 ML/HR: 50 INJECTION, SOLUTION INTRAVENOUS at 10:47

## 2021-01-01 RX ADMIN — IPRATROPIUM BROMIDE AND ALBUTEROL SULFATE 3 ML: 2.5; .5 SOLUTION RESPIRATORY (INHALATION) at 18:20

## 2021-01-01 RX ADMIN — DEXMEDETOMIDINE HYDROCHLORIDE 1.5 MCG/KG/HR: 100 INJECTION, SOLUTION INTRAVENOUS at 21:01

## 2021-01-01 RX ADMIN — HYDRALAZINE HYDROCHLORIDE 100 MG: 25 TABLET, FILM COATED ORAL at 21:00

## 2021-01-01 RX ADMIN — CLEVIPIDINE 32 MG/HR: 0.5 EMULSION INTRAVENOUS at 08:37

## 2021-01-01 RX ADMIN — AMLODIPINE BESYLATE 10 MG: 5 TABLET ORAL at 09:04

## 2021-01-01 RX ADMIN — HYDRALAZINE HYDROCHLORIDE 20 MG: 20 INJECTION INTRAMUSCULAR; INTRAVENOUS at 01:58

## 2021-01-01 RX ADMIN — CLEVIPIDINE 1 MG/HR: 0.5 EMULSION INTRAVENOUS at 11:35

## 2021-01-01 RX ADMIN — AZITHROMYCIN DIHYDRATE 250 MG: 250 TABLET, FILM COATED ORAL at 08:05

## 2021-01-01 RX ADMIN — VECURONIUM BROMIDE 10 MG: 1 INJECTION, POWDER, LYOPHILIZED, FOR SOLUTION INTRAVENOUS at 15:03

## 2021-08-21 PROBLEM — F10.10 ALCOHOL ABUSE: Chronic | Status: ACTIVE | Noted: 2021-01-01

## 2021-08-21 PROBLEM — Z91.199 MEDICALLY NONCOMPLIANT: Chronic | Status: ACTIVE | Noted: 2021-01-01

## 2021-08-21 PROBLEM — Z72.0 TOBACCO ABUSE: Chronic | Status: ACTIVE | Noted: 2021-01-01

## 2021-08-21 PROBLEM — I71.9 AORTIC ANEURYSM AND DISSECTION (HCC): Status: ACTIVE | Noted: 2021-01-01

## 2021-08-21 PROBLEM — I71.00 DISSECTION OF AORTA (HCC): Status: ACTIVE | Noted: 2021-01-01

## 2021-08-21 PROBLEM — I71.00 AORTIC ANEURYSM AND DISSECTION (HCC): Status: ACTIVE | Noted: 2021-01-01

## 2021-08-21 PROBLEM — I10 HYPERTENSION: Chronic | Status: ACTIVE | Noted: 2021-01-01

## 2021-08-22 PROBLEM — J96.01 ACUTE RESPIRATORY FAILURE WITH HYPOXIA (HCC): Status: ACTIVE | Noted: 2021-01-01

## 2021-08-23 NOTE — ANESTHESIA POSTPROCEDURE EVALUATION
Patient: Geovanny Em    Procedure Summary     Date: 08/23/21 Room / Location: [facility] ENDO VIRTUAL RM /  [facility]    Anesthesia Start: 1121 Anesthesia Stop: 1137    Procedure: BRONCHOSCOPY AT BEDSIDE (N/A Bronchus) Diagnosis:       Acute respiratory failure with hypoxia (CMS/HCC)      (Acute respiratory failure with hypoxia (CMS/HCC) [J96.01])    Surgeons: Rubin Laird MD Provider: Blane Vo MD    Anesthesia Type: MAC ASA Status: 4 - Emergent          Anesthesia Type: MAC    Vitals  Vitals Value Taken Time   BP     Temp     Pulse 61 08/23/21 1153   Resp 28 08/23/21 1137   SpO2 92 % 08/23/21 1153   Vitals shown include unvalidated device data.        Post Anesthesia Care and Evaluation    Patient location during evaluation: ICU  Patient participation: complete - patient cannot participate  Level of consciousness: obtunded/minimal responses  Airway patency: patent  Anesthetic complications: No anesthetic complications  PONV Status: none  Cardiovascular status: hemodynamically stable, blood pressure returned to baseline, acceptable and bradycardic  Respiratory status: unstable, intubated, ETT and ventilator  Hydration status: acceptable

## 2021-08-23 NOTE — ANESTHESIA PREPROCEDURE EVALUATION
Anesthesia Evaluation     Patient summary reviewed and Nursing notes reviewed   NPO Solid Status: > 8 hours  NPO Liquid Status: > 8 hours           Airway   Dental      Pulmonary    Cardiovascular     Rhythm: regular    (+) hypertension,       Neuro/Psych  GI/Hepatic/Renal/Endo      Musculoskeletal     Abdominal    Substance History   (+) alcohol use,      OB/GYN          Other        ROS/Med Hx Other: 30 cm descending aortic aneurysm      Phys Exam Other: Intubated, juan francisco and triple lumen rij in situ  Versed and fentanyl gtts, cleviprex gtt                Anesthesia Plan    ASA 4 - emergent     MAC     intravenous induction

## 2021-08-24 PROBLEM — Z28.310 COVID-19 VACCINE SERIES DECLINED: Status: ACTIVE | Noted: 2021-01-01

## 2021-08-24 PROBLEM — N17.9 AKI (ACUTE KIDNEY INJURY) (HCC): Status: ACTIVE | Noted: 2021-01-01

## 2021-08-24 PROBLEM — A37.91: Status: ACTIVE | Noted: 2021-01-01

## 2021-08-24 PROBLEM — Z28.21 COVID-19 VACCINE SERIES DECLINED: Status: ACTIVE | Noted: 2021-01-01

## 2021-08-26 PROBLEM — G93.40 ACUTE ENCEPHALOPATHY: Status: ACTIVE | Noted: 2021-01-01

## 2021-08-27 PROBLEM — J18.9 PNEUMONIA DUE TO INFECTIOUS ORGANISM: Status: ACTIVE | Noted: 2021-01-01

## 2021-08-31 PROBLEM — I16.0 HYPERTENSIVE URGENCY: Status: ACTIVE | Noted: 2021-01-01

## 2021-08-31 PROBLEM — J80 ARDS (ADULT RESPIRATORY DISTRESS SYNDROME) (HCC): Status: ACTIVE | Noted: 2021-01-01

## 2021-09-01 NOTE — PROCEDURES
Bronchoscopy Procedure Note    Name: Geovanny Em   Age:  46 y.o.   Sex: male  :  1975  MRN: [id number]  Time of procedure: 22:24 EDT      Date of Operation: 2021     Pre-op Diagnosis: PNA acute resp failure    Post-op Diagnosis: same    Surgeon: Kayla Carter    Anesthesia: Conscious Sedation    Operation: Flexible fiberoptic bronchoscopy, diagnostic bronchoscope  Findings: frothy thin bloody secretions    Specimen: lung wash     Estimated Blood Loss: less than 10 cc    Drains: none    Complications: None    Indications and History:  The patient is a 46 y.o. with acute hypoxic resp failure unknow etiology not stable to send for CT scan.  The risks, benefits, complications, treatment options and expected outcomes were discussed with the family and informed consent was obtained. The possibilities of reaction to medication, pulmonary aspiration, pneumothorax, bleeding, respiratory failure and failure to diagnose a condition and creating a complication requiring transfusion or operation were discussed with the patient who freely signed the consent.      Description of Procedure:  After the induction of topical nasopharyngeal anesthesia, the bronchoscope was passed through the endotracheal tube .  Careful inspection of the tracheal lumen was accomplished.     An entire bronchial exam was performed including the right and left bronchi with the following findings:     Endobronchial findings: Multiple segment thin bloody secretions different segment suctioned and washed collected   Trachea: Normal  Ngozi: Normal shape  Right upper lobe bronchus: nicely open no lesions  Right midlelobe bronchus:nicely open no lesions  Right lower lobe bronchus: nicely open no lesions  Left main bronchus: nicely open no lesions  Left upper lobe bronchus: nicely open no lesions  Left lower lobe bronchus: nicely open no lesions    The Patient tolerated the procedure well.       NELIDA Beckham, [facility]  2021  22:24  EDT

## 2021-09-01 NOTE — PROGRESS NOTES
"Pharmacy consult - total parental nutrition    Geovanny Em is a 46 y.o.male admitted with type B aortic dissection. Pharmacy consulted to dose TPN for Nonfunctional or inaccessible GI tract per Dr. Carter's request. Patient was previously on tube feeds 8/23 - 8/27 but turned off d/t issues with abdominal distention and high abdominal pressure.      Assessment  Estimated macronutrient goal requirements per RD:    TPN type: Clinimix 5/20 (2400 mL/day)  Line type: Central  Protein: 120 grams/day (~1.2 grams/kg/day)  Lipids: 250 mL of 20% lipid infusion 2 days/week  Kcal/day: 2327 kcal (~25 kcal/kg/day)    Plan    Patient at risk for RFS d/t suboptimal intake and h/o EtOH abuse. Pt is also on clevidipine today, which is a lipid based emulsion. Will continue TPN with the following macros per d/w RD Remedios:    Protein: 75 grams/day  Dextrose: 300 grams/day  Lipids: holding while on clevidipine  Volume: 1510 mL (63 mL/hr over 24 hrs daily)    Ionized calcium now high, will remove from TPN. Sodium, phosphorus, and magnesium are still elevated so will continue to hold. Will also hold trace elements as bilirubin is elevated. MVI to be added M-F d/t stability. Based on labs, will add the following electrolytes/additives to the TPN:     Date: 8/30 8/31 9/1      TPN Lytes (60mL)         CaGluc (mEq) 8 8       MgSO4 (mEq)         NaCl (mEq)         NaAcetate (mEq)         KCL (mEq)         KAcetate (mEq)         NaPhos (mmol)         Kphos (mmol)         Famotidine (mg)         MVI (10 mL) 10 10 10      Trace (1 mL)         Humulin R (units)             Patient also received the following electrolyte replacement per protocol:    N/A.    Pharmacy will continue to follow.          Objective  Relevant clinical data and objective history reviewed:  177.8 cm (70\")   106 kg (232 lb 12.9 oz)   Ideal body weight: 73 kg (160 lb 15 oz)  Adjusted ideal body weight: 86 kg (189 lb 10.9 oz)  Body mass index is 33.4 kg/m².    Results from last " 7 days   Lab Units 09/01/21  0523 08/31/21  0537 08/30/21  1415 08/30/21  1157 08/30/21  0325 08/29/21  1246 08/29/21  0518 08/28/21  0244 08/27/21  0448 08/26/21  0334   SODIUM mmol/L 150* 147*  --  149* 148*  --  150* 151* 148* 143   POTASSIUM mmol/L 4.7 4.5  --  4.4 4.6  --  4.1 4.1 4.5 4.1   CHLORIDE mmol/L 118* 115*  --  116* 116*  --  118* 118* 113* 111*   CO2 mmol/L 21.0* 21.0*  --  22.0 21.0*  --  20.0* 20.0* 23.0 24.0   GLUCOSE mg/dL 340* 177*  --  149* 150*  --  147* 158* 225* 165*   BUN mg/dL 89* 81*  --  83* 85*  --  85* 65* 51* 48*   CREATININE mg/dL 1.68* 1.84*  --  1.96* 2.06*  --  2.23* 1.90* 1.54* 1.51*   CALCIUM mg/dL 8.8 8.7  --  8.4* 8.5*  --  8.2* 8.4* 8.7 8.4*   IONIZED CALCIUM mmol/L 1.33* 1.26  --  1.23  --   --   --  1.22  --   --    PHOSPHORUS mg/dL 5.0* 5.5* 5.1* 5.4*  --   --  4.8* 4.2 3.9 3.3   MAGNESIUM mg/dL 3.1* 3.0*  --  3.0* 3.0*  --  2.6 2.4 2.6 2.5   TOTAL PROTEIN g/dL 5.8* 6.1  --  5.7* 6.0 5.6*  --  5.9*  --   --    ALBUMIN g/dL 2.50* 2.50*  --  2.50* 2.50* 2.50* 2.50* 2.80* 3.20* 2.80*   PREALBUMIN mg/dL  --   --   --  7.9*  --   --   --   --   --   --    BILIRUBIN mg/dL 1.9* 2.6*  --  2.1* 1.9* 1.3*  --  0.9  --   --    ALK PHOS U/L 99 108  --  104 106 111  --  143*  --   --    AST (SGOT) U/L 18 20  --  19 14 13  --  26  --   --    ALT (SGPT) U/L 31 29  --  27 27 26  --  43*  --   --    TRIGLYCERIDES mg/dL  --   --   --  157*  --   --   --  83  --   --      I/O last 3 completed shifts:  In: 5051 [I.V.:4881; Other:120; NG/GT:50]  Out: 6275 [Urine:6275]  Estimated Creatinine Clearance: 65.6 mL/min (A) (by C-G formula based on SCr of 1.68 mg/dL (H)).    Dietary Orders (From admission, onward)       Start     Ordered    08/30/21 1138  NPO Diet  Diet Effective Now      08/30/21 1141                    Valentina Martin RPH  13:53 EDT 9/1/2021

## 2021-09-01 NOTE — PROGRESS NOTES
F/U:  Type B aortic dissection--Pagni  EF 55-60% (echo)    Subjective:  Intubated/sedated    Hypoxia continues, remains on 100%/+17 PEEP, inhaled Flolan  TPN started  Awaiting COVID-19 with BAL testing  COVID-19 neg x2 (last test was 8/29)  Drips:  Versed, Fentanyl, TPN, Cleviprex 8, iFlolan  Wt up 12 kgs since admission        Intake/Output Summary (Last 24 hours) at 9/1/2021 0811  Last data filed at 9/1/2021 0600  Gross per 24 hour   Intake 2731 ml   Output 4325 ml   Net -1594 ml     Temp:  [96.3 °F (35.7 °C)-98.6 °F (37 °C)] 97.3 °F (36.3 °C)  Heart Rate:  [60-69] 67  Resp:  [18-28] 28  BP: (138-167)/(70-77) 167/72  Arterial Line BP: (144-169)/(60-71) 147/62  FiO2 (%):  [100 %] 100 %      Results from last 7 days   Lab Units 09/01/21  0523 08/31/21  0538   WBC 10*3/mm3 22.50* 21.10*   HEMOGLOBIN g/dL 11.6* 11.7*   HEMATOCRIT % 34.9* 36.3*   PLATELETS 10*3/mm3 170 169     Results from last 7 days   Lab Units 09/01/21  0523   CREATININE mg/dL 1.68*   POTASSIUM mmol/L 4.7   SODIUM mmol/L 150*   MAGNESIUM mg/dL 3.1*   PHOSPHORUS mg/dL 5.0*       Physical Exam:  Intubated/sedated  Tele:  SB 60s  Vent--100% +17 PEEP      Assessment/Plan:  Principal Problem:    Aortic aneurysm and dissection (CMS/HCC)  Active Problems:    Tobacco abuse    Hypertension    Medically noncompliant    Dissection of aorta (CMS/HCC)    Alcohol abuse    Acute respiratory failure with hypoxia (CMS/HCC)    Pertussis pneumonia    VANESSA (acute kidney injury)    COVID-19 vaccine series declined    Acute encephalopathy    Pneumonia due to infectious organism    ARDS (adult respiratory distress syndrome)    Hypertensive urgency    Acute type B dissection without mal perfusion--stable  Acute respiratory failure with hypoxia, Pertussis pna, Staph aureus pna--pulm following  VANESSA/ARF with hypernatremia--cr 1.68 (1.5),na 150, renal following  Untreated HTN--oral meds now  Abdominal distention with elevated bladder pressure--gen surgery following  Volume  excess r/t antihypertensive drips  Tobacco abuse--cessation d/w pt  ETOH abuse/withdrawal--15 beers/daily  Non-compliance with medications--off BP meds x 5 years    He is stable from type B dissection but has multiple other medical issues at this time.  Continue enhanced droplet/contact precautions until BAL results returned.  Attempting to wean iFlolan today so we can get imaging completed.  Will continue to follow along.    Sandi Perez-Oenil, APRN  9/1/2021  08:11 EDT  Addendum  Patient was seen and examined by me, agree with the findings above.  He has multisystem organ failure most likely due to pneumonia and possible aspiration and a type B aortic dissection which is a good and seem to be stable.  Continue multisystem support.  Condition is critical  Efraín Todd MD

## 2021-09-01 NOTE — NURSING NOTE
WOCN note:    WOCN consult received for possible hospital acquired pressure injuries to feet.   Patient is 46 yr old male admitted 8/21/2021 with c/o back and chest pain. Found to have aortic aneurysm and dissection.     DTIs noted to Achilles area of bilateral feet and anterior ankle of left foot. Family at bedside reported that these areas were noted after the removal of heel protectors when patient's feet became swollen.    All pressure has currently been removed and heels are floated off bed with pillows. Recommend to monitor closely and notify WOCN if areas worsen or open wounds develop.

## 2021-09-01 NOTE — PROGRESS NOTES
NEPHROLOGY PROGRESS NOTE------[facility]    [facility]  [phone]  Carin Polk MD      Patient Care Team:  Provider, No Known as PCP - Ranulfo Marinelli MD as Consulting Physician (Nephrology)      Provider:  Carin Polk MD  Patient Name: Geovanny Em  :  1975    SUBJECTIVE:    F/U ARF/VANESSA/HTN    SEDATED AND INTUBATED ON VENT. BP okay. Spoke to patient's significant other in the room    Medication:  amLODIPine, 10 mg, Oral, Q24H  arformoterol, 15 mcg, Nebulization, BID - RT  budesonide, 0.5 mg, Nebulization, BID - RT  bumetanide, 1 mg, Intravenous, Q12H  cloNIDine, 1 patch, Transdermal, Weekly  famotidine, 20 mg, Nasogastric, Daily  folic acid, 1 mg, Oral, Daily  hydrALAZINE, 100 mg, Nasogastric, Q8H  insulin lispro, 0-9 Units, Subcutaneous, Q6H  ipratropium-albuterol, 3 mL, Nebulization, Q4H - RT  methylPREDNISolone sodium succinate, 125 mg, Intravenous, Q6H  metoclopramide, 5 mg, Intravenous, Q8H  multivitamin, 1 tablet, Oral, Daily  piperacillin-tazobactam, 3.375 g, Intravenous, Q8H  polyethylene glycol, 17 g, Nasogastric, Daily  sildenafil, 20 mg, Oral, Q8H  sodium chloride, 3 mL, Intravenous, Q12H  thiamine, 100 mg, Nasogastric, Daily      Adult Central Clinimix TPN, , Last Rate: 43 mL/hr at 21 1756  clevidipine, 2-32 mg/hr, Last Rate: 8 mg/hr (21 0551)  epoprostenol, 50 ng/kg/min (Ideal), Last Rate: 50 ng/kg/min (21 035)  fentanyl 10 mcg/mL,  mcg/hr, Last Rate: 200 mcg/hr (21 0403)  labetalol, 0.5-2 mg/min, Last Rate: 0.5 mg/min (21 0826)  midazolam 1 mg/mL 100mL NS, 1-10 mg/hr, Last Rate: 7 mg/hr (21 010)  Pharmacy to Dose TPN,   Pharmacy to Dose Zosyn,         OBJECTIVE    Vital Sign Min/Max for last 24 hours  Temp  Min: 96.3 °F (35.7 °C)  Max: 98.6 °F (37 °C)   BP  Min: 138/70  Max: 167/72   Pulse  Min: 60  Max: 69   Resp  Min: 18  Max: 28   SpO2  Min: 86 %  Max: 96  "%   No data recorded   Weight  Min: 106 kg (232 lb 12.9 oz)  Max: 106 kg (232 lb 12.9 oz)     Flowsheet Rows      First Filed Value   Admission Height  177.8 cm (70\") Documented at 08/21/2021 0136   Admission Weight  94.3 kg (207 lb 14.3 oz) Documented at 08/21/2021 0136          No intake/output data recorded.  I/O last 3 completed shifts:  In: 5051 [I.V.:4881; Other:120; NG/GT:50]  Out: 6275 [Urine:6275]    Physical Exam:  ON CLEVIPREX  General Appearance: SEDATED AND INTUBATED ON VENT  Head: normocephalic, without obvious abnormality and atraumatic +ETT INTACT/NGT  Eyes: conjunctivae and sclerae normal and no icterus  Neck: supple and no JVD  Lungs: diminished breath sounds   Heart: regular rhythm & normal rate and normal S1, S2 +SASCHA  Chest: Wall no abnormalities observed  Abdomen: ABDOMEN DISTENDED +HYPOACTIVE BS +CRUZ  Extremities: No edema, no cyanosis and no redness  Skin: no bleeding, bruising or rash, turgor normal, color normal and no lesions noted  Neurologic: INTUBATED ON VENT     Labs:    WBC WBC   Date Value Ref Range Status   09/01/2021 22.50 (H) 3.40 - 10.80 10*3/mm3 Final   08/31/2021 21.10 (H) 3.40 - 10.80 10*3/mm3 Final   08/30/2021 21.50 (H) 3.40 - 10.80 10*3/mm3 Final      HGB Hemoglobin   Date Value Ref Range Status   09/01/2021 11.6 (L) 13.0 - 17.7 g/dL Final   08/31/2021 11.7 (L) 13.0 - 17.7 g/dL Final   08/30/2021 11.4 (L) 13.0 - 17.7 g/dL Final      HCT Hematocrit   Date Value Ref Range Status   09/01/2021 34.9 (L) 37.5 - 51.0 % Final   08/31/2021 36.3 (L) 37.5 - 51.0 % Final   08/30/2021 35.1 (L) 37.5 - 51.0 % Final      Platlets No results found for: LABPLAT   MCV MCV   Date Value Ref Range Status   09/01/2021 98.9 (H) 79.0 - 97.0 fL Final   08/31/2021 99.1 (H) 79.0 - 97.0 fL Final   08/30/2021 97.9 (H) 79.0 - 97.0 fL Final          Sodium Sodium   Date Value Ref Range Status   09/01/2021 150 (H) 136 - 145 mmol/L Final   08/31/2021 147 (H) 136 - 145 mmol/L Final   08/30/2021 149 (H) 136 " - 145 mmol/L Final   08/30/2021 148 (H) 136 - 145 mmol/L Final      Potassium Potassium   Date Value Ref Range Status   09/01/2021 4.7 3.5 - 5.2 mmol/L Final   08/31/2021 4.5 3.5 - 5.2 mmol/L Final   08/30/2021 4.4 3.5 - 5.2 mmol/L Final     Comment:     Slight hemolysis detected by analyzer. Results may be affected.   08/30/2021 4.6 3.5 - 5.2 mmol/L Final      Chloride Chloride   Date Value Ref Range Status   09/01/2021 118 (H) 98 - 107 mmol/L Final   08/31/2021 115 (H) 98 - 107 mmol/L Final   08/30/2021 116 (H) 98 - 107 mmol/L Final   08/30/2021 116 (H) 98 - 107 mmol/L Final      CO2 CO2   Date Value Ref Range Status   09/01/2021 21.0 (L) 22.0 - 29.0 mmol/L Final   08/31/2021 21.0 (L) 22.0 - 29.0 mmol/L Final   08/30/2021 22.0 22.0 - 29.0 mmol/L Final   08/30/2021 21.0 (L) 22.0 - 29.0 mmol/L Final      BUN BUN   Date Value Ref Range Status   09/01/2021 89 (H) 6 - 20 mg/dL Final   08/31/2021 81 (H) 6 - 20 mg/dL Final   08/30/2021 83 (H) 6 - 20 mg/dL Final   08/30/2021 85 (H) 6 - 20 mg/dL Final      Creatinine Creatinine   Date Value Ref Range Status   09/01/2021 1.68 (H) 0.76 - 1.27 mg/dL Final   08/31/2021 1.84 (H) 0.76 - 1.27 mg/dL Final   08/30/2021 1.96 (H) 0.76 - 1.27 mg/dL Final   08/30/2021 2.06 (H) 0.76 - 1.27 mg/dL Final      Calcium Calcium   Date Value Ref Range Status   09/01/2021 8.8 8.6 - 10.5 mg/dL Final   08/31/2021 8.7 8.6 - 10.5 mg/dL Final   08/30/2021 8.4 (L) 8.6 - 10.5 mg/dL Final   08/30/2021 8.5 (L) 8.6 - 10.5 mg/dL Final      PO4 No components found for: PO4   Albumin Albumin   Date Value Ref Range Status   09/01/2021 2.50 (L) 3.50 - 5.20 g/dL Final   08/31/2021 2.50 (L) 3.50 - 5.20 g/dL Final   08/30/2021 2.50 (L) 3.50 - 5.20 g/dL Final   08/30/2021 2.50 (L) 3.50 - 5.20 g/dL Final   08/29/2021 2.50 (L) 3.50 - 5.20 g/dL Final      Magnesium Magnesium   Date Value Ref Range Status   09/01/2021 3.1 (H) 1.6 - 2.6 mg/dL Final   08/31/2021 3.0 (H) 1.6 - 2.6 mg/dL Final   08/30/2021 3.0 (H) 1.6 -  2.6 mg/dL Final   08/30/2021 3.0 (H) 1.6 - 2.6 mg/dL Final      Uric Acid No components found for: URIC ACID     Imaging Results (Last 72 Hours)     Procedure Component Value Units Date/Time    XR Chest 1 View [[id number]] Resulted: 09/01/21 0652     Updated: 09/01/21 0652    XR Chest 1 View [[id number]] Collected: 08/31/21 0742     Updated: 08/31/21 0746    Narrative:      XR CHEST 1 VW-     Date of Exam: 8/31/2021 6:06 AM     Indication: decreased O2 sat; per Selma Holley, APRN;  I71.00-Dissection of unspecified site of aorta; J96.01-Acute respiratory  failure with hypoxia; J18.9-Pneumonia, unspecified organism.     Comparison Exams: August 29, 2021     Technique: Single AP chest radiograph     FINDINGS:  Endotracheal tube has its tip in the midtrachea. An NG tube has its tip  below the diaphragm. A right internal jugular catheter has its tip at  the cavoatrial junction. Diffuse bilateral airspace consolidations are  slightly improved on the right. The heart and mucosal contours appear  stable. The osseous structures appear intact.       Impression:      1.Endotracheal tube in good position.  2.Diffuse bilateral airspace consolidations appear slightly improved on  the right, which could represent pneumonia, pulmonary edema, or ARDS.     Electronically Signed By-Shahriar Sutton MD On:8/31/2021 7:44 AM  This report was finalized on [id number] by  Shahriar Sutton MD.    US Abdomen Limited [[id number]] Collected: 08/29/21 1312     Updated: 08/29/21 1316    Narrative:      DATE OF EXAM:  8/29/2021 12:43 PM     PROCEDURE:  US ABDOMEN LIMITED-     INDICATIONS:  Abdominal distention, evaluate for ascites.     COMPARISON:  No Comparisons Available     TECHNIQUE:   Grayscale and color Doppler ultrasound evaluation of the limited abdomen  was performed.     FINDINGS:  There is scant ascitic fluid in the right lower quadrant measuring 3.3 x  2.9 cm. There is also scant ascitic fluid in the lower pelvis measuring  2.8 x  3.8 cm. There is a small amount of ascitic fluid adjacent to the  tip of the liver in the right upper quadrant measuring 6.1 x 3.4 cm.  There is no ascites within the left aspect of the abdomen or pelvis.       Impression:      Scant abdominal and pelvic ascites as noted above.     Electronically Signed By-Chirag Nguyen MD On:8/29/2021 1:14 PM  This report was finalized on [id number] by  Chirag Nguyen MD.          Results for orders placed during the hospital encounter of 08/21/21    XR Chest 1 View    Narrative  XR CHEST 1 VW-    Date of Exam: 8/31/2021 6:06 AM    Indication: decreased O2 sat; per Selma Holley, APRN;  I71.00-Dissection of unspecified site of aorta; J96.01-Acute respiratory  failure with hypoxia; J18.9-Pneumonia, unspecified organism.    Comparison Exams: August 29, 2021    Technique: Single AP chest radiograph    FINDINGS:  Endotracheal tube has its tip in the midtrachea. An NG tube has its tip  below the diaphragm. A right internal jugular catheter has its tip at  the cavoatrial junction. Diffuse bilateral airspace consolidations are  slightly improved on the right. The heart and mucosal contours appear  stable. The osseous structures appear intact.    Impression  1.Endotracheal tube in good position.  2.Diffuse bilateral airspace consolidations appear slightly improved on  the right, which could represent pneumonia, pulmonary edema, or ARDS.    Electronically Signed By-Shahriar Sutton MD On:8/31/2021 7:44 AM  This report was finalized on [id number] by  Shahriar Sutton MD.      XR Abdomen KUB    Narrative  DATE OF EXAM:  8/28/2021 2:19 PM    PROCEDURE:  XR ABDOMEN KUB-    INDICATIONS:  Abdominal distention, aortic dissection.    COMPARISON:  08/22/2021.    TECHNIQUE:  Single radiographic view of the abdomen was obtained.      FINDINGS:  The tip of the nasogastric tube terminates within the body of the  stomach. The bowel gas pattern is unremarkable. There is old barium  contrast seen  within the distal colon. There is a Galvan catheter in the  urinary bladder. The bony structures are within range of normal.    Impression  1. The tip of the nasogastric tube terminates within the body of the  stomach.  2. Unremarkable bowel gas pattern.    Electronically Signed By-Chirag Nguyen MD On:8/28/2021 3:11 PM  This report was finalized on [id number] by  Chirag Nguyen MD.      XR Chest 1 View    Narrative  DATE OF EXAM:  8/29/2021 7:54 AM    PROCEDURE:  XR CHEST 1 VW-    INDICATIONS:  Aortic dissection, acute respiratory failure with hypoxia, pneumonia.    COMPARISON:  8/28/2021.    TECHNIQUE:  Single radiographic view of the chest was obtained.    FINDINGS:  There is a stable right internal jugular line and endotracheal tube in  place. The nasogastric tube tip projects outside the field of view, but  below the hemidiaphragms. The heart is enlarged. There is worsening  bilateral mixed interstitial/airspace disease which can be seen with  pneumonia, pulmonary edema, or ARDS.  Small bilateral basilar pleural  effusions are again suspected.    Impression  Worsening bilateral mixed interstitial/airspace disease which can be  seen with pneumonia, pulmonary edema, or ARDS.    Electronically Signed By-Chirag Nguyen MD On:8/29/2021 10:12 AM  This report was finalized on [id number] by  Chirag Nguyen MD.            ASSESSMENT / PLAN      Aortic aneurysm and dissection (CMS/HCC)    Tobacco abuse    Hypertension    Medically noncompliant    Dissection of aorta (CMS/HCC)    Alcohol abuse    Acute respiratory failure with hypoxia (CMS/HCC)    Pertussis pneumonia    VANESSA (acute kidney injury)    COVID-19 vaccine series declined    Acute encephalopathy    Pneumonia due to infectious organism    ARDS (adult respiratory distress syndrome)    Hypertensive urgency    1. ARF/VANESSA-----Nonoliguric. BUN up a little. Creatinine down a little. Adjust diuretics to Lasix post Albumin. May need dialysis. Will see where labs are tomorrow.  Spoke to patient's significant other that's present in the room. Continue maintenance D5W IV.  +ATN/DIANDRA from BP irregularities and IV dye exposure. Gradual BP reduction. No NSAIDs. Avoid hypotension and bradycardia. Dose meds for CrCl less than 10 cc/min.     2. ACCELERATED HTN-----BP okay. On Cleviprex and sedation    3. H/O ETOH ABUSE    4. AAA------per CT Surgery    5. GERD/PUD PROPHYLAXIS-----Renal dose adjusted Pepcid    6. MEDICAL NONCOMPLIANCE    7. RESPIRATORY FAILURE/HYPOXIA-----per Pulmonary/CC    8. HYPOMAGNESEMIA------Replaced    9. HYPOKALEMIA-----Replaced    10. HYPOALBUMINEMIA-----S/P IV Albumin to temporize    11. HYPERNATREMIA------IV D5W and adjust diuretics    12. ABDOMINAL DISTENSION    13. HYPOALBUMINEMIA------IV Albumin to temporize    14. NUTRITION-----On TPN    Carin Polk MD  [facility]  [phone]  09/01/21  07:42 EDT

## 2021-09-01 NOTE — PROGRESS NOTES
Cardiology Progress Note      Admiting Physician:  Kayla Carter MD   LOS: 11 days         Cardiology assessment and plan    Type B aortic dissection  Respiratory failure on vent  Abdominal distention  Malignant hypertension currently under multiple medications  History of alcohol abuse  Normal LV systolic function  Persistent hypoxia possible ARDS  Acute on chronic renal failure  CT of the chest and abdomen with a stable dissection from the left subclavian to the SMA true lumen supplies the celiac and SMA arteries      Continue current medical therapy  Labs and medications reviewed  Continue close monitoring  Discussed with family at bedside  Patient underwent bronchoscopy today  Discussed with surgical team  Further recommendations based on patient course              Reason For Followup:  Type B aortic dissection      Subjective:    Interval History:  Patient was not examined today due to new Covid nineteen isolation pending swab results. Attending note reviewed and outliers noted. Rhythm sinus at 66, /77. Still unable to perform CT as patient decompensates with hypoxia and bradycardia.  Drips: Propofol, fentanyl. Repeat CT chest/abdomen with stable dissection from left subclavian to SMA, true lumen supplies celiac and SMA arteries.    Review of Systems:  Unable to obtain review of systems    Assessment & Plan    Impressions:  Type B aortic dissection without malperfusion  Malignant hypertension-currently with significant lability  Medically noncompliant  Tobacco abuse  Alcohol abuse    Recommendations:  Continue current medical therapy  Repeat limIted echo performed secondary to acute EKG changes, no significant findings.  EF 56-60%.  Continue to monitor rhythm and pressure closely  Nephrology managing renal aspects-creatinine improving and urine output improving.  Pulmonary managing ventilator.  Sedation resumed  Further recommendations as patient's course progresses.  Prognosis quite guarded.      Seen  and examined patient agree with note written by the nurse practitioner  Objective:    Medication Review:   Scheduled Meds:albumin human, 25 g, Intravenous, Q8H  amLODIPine, 10 mg, Oral, Q24H  arformoterol, 15 mcg, Nebulization, BID - RT  budesonide, 0.5 mg, Nebulization, BID - RT  cloNIDine, 1 patch, Transdermal, Weekly  famotidine, 20 mg, Nasogastric, Daily  folic acid, 1 mg, Oral, Daily  furosemide, 20 mg, Intravenous, Q8H  hydrALAZINE, 100 mg, Nasogastric, Q8H  insulin lispro, 0-9 Units, Subcutaneous, Q6H  ipratropium-albuterol, 3 mL, Nebulization, Q4H - RT  methylPREDNISolone sodium succinate, 125 mg, Intravenous, Q6H  metoclopramide, 5 mg, Intravenous, Q8H  multivitamin, 1 tablet, Oral, Daily  piperacillin-tazobactam, 3.375 g, Intravenous, Q8H  polyethylene glycol, 17 g, Nasogastric, Daily  sildenafil, 20 mg, Oral, Q8H  sodium chloride, 3 mL, Intravenous, Q12H  thiamine, 100 mg, Nasogastric, Daily      Continuous Infusions:Adult Central Clinimix TPN, , Last Rate: 43 mL/hr at 08/31/21 1756  clevidipine, 2-32 mg/hr, Last Rate: 8 mg/hr (09/01/21 0856)  epoprostenol, 50 ng/kg/min (Ideal), Last Rate: 50 ng/kg/min (09/01/21 0923)  fentanyl 10 mcg/mL,  mcg/hr, Last Rate: 200 mcg/hr (09/01/21 0403)  labetalol, 0.5-2 mg/min, Last Rate: 0.5 mg/min (08/31/21 0826)  midazolam 1 mg/mL 100mL NS, 1-10 mg/hr, Last Rate: 7 mg/hr (09/01/21 0106)  Pharmacy to Dose TPN,   Pharmacy to Dose Zosyn,       PRN Meds:.•  acetaminophen  •  bisacodyl  •  carboxymethylcellulose sod  •  clevidipine  •  dextrose  •  dextrose  •  glucagon (human recombinant)  •  hydrALAZINE  •  insulin lispro **AND** insulin lispro  •  LORazepam  •  ondansetron **OR** ondansetron  •  Pharmacy to Dose TPN  •  Pharmacy to Dose Zosyn  •  sodium chloride  •  vecuronium    Patient Active Problem List   Diagnosis   • Tobacco abuse   • Hypertension   • Aortic aneurysm and dissection (CMS/HCC)   • Medically noncompliant   • Dissection of aorta (CMS/HCC)   •  Alcohol abuse   • Acute respiratory failure with hypoxia (CMS/HCC)   • Pertussis pneumonia   • VANESSA (acute kidney injury)   • COVID-19 vaccine series declined   • Acute encephalopathy   • Pneumonia due to infectious organism   • ARDS (adult respiratory distress syndrome)   • Hypertensive urgency         Physical Exam:    General: Sedated on ventilator  Head:  Normocephalic, atraumatic, mucous membranes moist  Eyes:  Conjunctivae/corneas clear, EOM's intact     Neck:  Supple,  no bruit  Lungs: Coarse and mechanical  Chest wall:   Heart::  Regular rate and rhythm, S1 and S2 normal, 1/6 holosystolic murmur.  No rub or gallop  Abdomen: Soft, bowel sounds hypoactive  Extremities: No cyanosis, clubbing, or edema  Pulses: 2+ and symmetric all extremities  Skin:  No rashes or lesions  Neuro/psych: Sedated on ventilator    Vital Signs:  Vitals:    09/01/21 0617 09/01/21 0644 09/01/21 0700 09/01/21 0800   BP:    152/77   BP Location:    Right arm   Patient Position:    Lying   Pulse: 65 67 66 66   Resp:    20   Temp:    97.6 °F (36.4 °C)   TempSrc:    Oral   SpO2: 90% 91% 91% 93%   Weight:       Height:         Wt Readings from Last 1 Encounters:   09/01/21 106 kg (232 lb 12.9 oz)       Intake/Output Summary (Last 24 hours) at 9/1/2021 1025  Last data filed at 9/1/2021 0600  Gross per 24 hour   Intake 2731 ml   Output 4325 ml   Net -1594 ml         Results Review:     CBC    Results from last 7 days   Lab Units 09/01/21  0523 08/31/21  0538 08/30/21  0325 08/29/21  0518 08/28/21  0244 08/27/21  0448 08/26/21  1549 08/26/21  0334   WBC 10*3/mm3 22.50* 21.10* 21.50* 20.00* 16.00* 16.30*  --  14.50*   HEMOGLOBIN g/dL 11.6* 11.7* 11.4* 10.9* 11.7* 11.8*  --  12.0*   HEMOGLOBIN, POC g/dL  --   --   --   --   --   --  12.7  --    PLATELETS 10*3/mm3 170 169 147 145 143 145  --  155     Cr Clearance Estimated Creatinine Clearance: 65.6 mL/min (A) (by C-G formula based on SCr of 1.68 mg/dL (H)).  Coag       HbA1C   Lab Results    Component Value Date    HGBA1C 6.0 (H) 08/27/2021     Blood Glucose   Glucose   Date/Time Value Ref Range Status   09/01/2021 0526 286 (H) 70 - 105 mg/dL Final     Comment:     Serial Number: [id number]:  [id number]   09/01/2021 0104 266 (H) 70 - 105 mg/dL Final     Comment:     Serial Number: [id number]:  [id number]   08/31/2021 1730 227 (H) 70 - 105 mg/dL Final     Comment:     Serial Number: [id number]:  [id number]   08/31/2021 1210 210 (H) 70 - 105 mg/dL Final     Comment:     Serial Number: [id number]:  [id number]   08/31/2021 0532 173 (H) 70 - 105 mg/dL Final     Comment:     Serial Number: [id number]:  [id number]   08/30/2021 2312 146 (H) 70 - 105 mg/dL Final     Comment:     Serial Number: [id number]:  [id number]   08/30/2021 1727 130 (H) 70 - 105 mg/dL Final     Comment:     Serial Number: [id number]:  [id number]   08/30/2021 1219 149 (H) 70 - 105 mg/dL Final     Comment:     Serial Number: [id number]:  [id number]     Infection   Results from last 7 days   Lab Units 08/30/21  1415 08/30/21  1333 08/29/21  0518   BLOODCX  No growth at 24 hours No growth at 24 hours  --    PROCALCITONIN ng/mL  --   --  1.28*     CMP   Results from last 7 days   Lab Units 09/01/21  0523 08/31/21  0537 08/30/21  1157 08/30/21  0325 08/29/21  1246 08/29/21  0518 08/28/21  0244 08/27/21  0809 08/27/21  0448 08/27/21  0448   SODIUM mmol/L 150* 147* 149* 148*  --  150* 151*  --   --  148*   POTASSIUM mmol/L 4.7 4.5 4.4 4.6  --  4.1 4.1  --   --  4.5   CHLORIDE mmol/L 118* 115* 116* 116*  --  118* 118*  --   --  113*   CO2 mmol/L 21.0* 21.0* 22.0 21.0*  --  20.0* 20.0*  --   --  23.0   BUN mg/dL 89* 81* 83* 85*  --  85* 65*  --   --  51*   CREATININE mg/dL 1.68* 1.84* 1.96* 2.06*  --  2.23* 1.90*  --   --  1.54*   GLUCOSE mg/dL 340* 177* 149* 150*  --  147* 158*  --   --  225*   ALBUMIN g/dL 2.50* 2.50* 2.50* 2.50* 2.50* 2.50* 2.80*  --    < > 3.20*   BILIRUBIN mg/dL 1.9* 2.6* 2.1*  1.9* 1.3*  --  0.9  --   --   --    ALK PHOS U/L 99 108 104 106 111  --  143*  --   --   --    AST (SGOT) U/L 18 20 19 14 13  --  26  --   --   --    ALT (SGPT) U/L 31 29 27 27 26  --  43*  --   --   --    AMMONIA umol/L  --   --   --   --   --   --   --  33  --   --     < > = values in this interval not displayed.     ABG    Results from last 7 days   Lab Units 08/31/21  0609 08/31/21  0358 08/30/21  0329 08/29/21  0310 08/28/21  1614 08/28/21  0343 08/27/21  0309   PH, ARTERIAL pH units 7.280* 7.320* 7.313* 7.381 7.380 7.441 7.401   PCO2, ARTERIAL mm Hg 48.3* 43.5 43.0 35.2 34.9* 30.2* 38.3   PO2 ART mm Hg 51.4* 72.5* 67.4* 75.6* 47.3* 75.2* 63.5*   O2 SATURATION ART % 81.1* 93.0* 91.3* 94.9 82.4* 95.7 92.0*   BASE EXCESS ART mmol/L -4.2* -3.6* -4.3* -3.7* -3.9* -2.7* -0.8*     UA        YUE  No results found for: POCMETH, POCAMPHET, POCBARBITUR, POCBENZO, POCCOCAINE, POCOPIATES, POCOXYCODO, POCPHENCYC, POCPROPOXY, POCTHC, POCTRICYC  Lysis Labs   Results from last 7 days   Lab Units 09/01/21  0523 08/31/21  0538 08/31/21  0537 08/30/21  1157 08/30/21  0325 08/29/21  0518 08/28/21  0244 08/27/21  0448 08/26/21  1549 08/26/21  0334 08/26/21  0334   HEMOGLOBIN g/dL 11.6* 11.7*  --   --  11.4* 10.9* 11.7* 11.8*  --   --  12.0*   HEMOGLOBIN, POC g/dL  --   --   --   --   --   --   --   --  12.7  --   --    PLATELETS 10*3/mm3 170 169  --   --  147 145 143 145  --   --  155   CREATININE mg/dL 1.68*  --  1.84* 1.96* 2.06* 2.23* 1.90* 1.54*  --    < > 1.51*    < > = values in this interval not displayed.     Radiology(recent) XR Chest 1 View    Result Date: 9/1/2021  1.Endotracheal tube in good position. 2.Patchy bilateral airspace consolidations appear slightly improved on the right, which could reflect pneumonia, pulmonary edema, or ARDS.  Electronically Signed By-Shahriar Sutton MD On:9/1/2021 7:52 AM This report was finalized on [id number] by  Shahriar Sutton MD.    XR Chest 1 View    Result Date:  8/31/2021  1.Endotracheal tube in good position. 2.Diffuse bilateral airspace consolidations appear slightly improved on the right, which could represent pneumonia, pulmonary edema, or ARDS.  Electronically Signed By-Shahriar Sutton MD On:8/31/2021 7:44 AM This report was finalized on [id number] by  Shahriar Sutton MD.              Imaging Results (Last 24 Hours)     Procedure Component Value Units Date/Time    XR Chest 1 View [[id number]] Collected: 09/01/21 0751     Updated: 09/01/21 0754    Narrative:      XR CHEST 1 VW-     Date of Exam: 9/1/2021 5:31 AM     Indication: Shortness of breath; I71.00-Dissection of unspecified site  of aorta; J96.01-Acute respiratory failure with hypoxia;  J18.9-Pneumonia, unspecified organism.     Comparison Exams: August 31, 2021     Technique: Single AP chest radiograph     FINDINGS:  An endotracheal tube has its tip in the midtrachea. An NG tube has its  tip below the diaphragm. A right internal jugular catheter has its tip  in the right atrium. Patchy bilateral airspace consolidations appear  slightly improved on the right. The heart and mediastinal contours  appear stable. The osseous structures appear intact.       Impression:      1.Endotracheal tube in good position.  2.Patchy bilateral airspace consolidations appear slightly improved on  the right, which could reflect pneumonia, pulmonary edema, or ARDS.     Electronically Signed By-Shahriar Sutton MD On:9/1/2021 7:52 AM  This report was finalized on [id number] by  Shahriar Sutton MD.          Cardiac Studies:  Echo- Results for orders placed during the hospital encounter of 08/21/21    Adult Transthoracic Echo Limited W/ Cont if Necessary Per Protocol    Interpretation Summary  · Saline test results are negative for right to left atrial level shunt.    Stress Myoview-  Cath-        DEVANTE Robbins  09/01/21  10:25 EDT         Electronically signed by DEVANTE Robbins, 09/01/21, 10:25 AM EDT.

## 2021-09-01 NOTE — PROGRESS NOTES
"Nutrition Services    Patient Name: Geovanny Em  YOB: 1975  MRN: [id number]  Admission date: 8/21/2021      PPE Documentation        PPE Worn By Provider RD did not enter room for this encounter   PPE Worn By Patient  -     PROGRESS NOTE      Encounter Information: TPN check on. TPN initiated 8/30 and today makes day #3 of TPN. Propofol off. Now on Cleviprex a 20 mL/hr (~960 kcals/day)        Labs (reviewed below): -Hypernatremia->serum Na+ trending back up  -elevated BUN/Cr  -hyperglycemia->insulin regimen adjusted  -hypermagnesemia   -Hyperphosphatemia       GI Function:  -Last BM documented on 8/26 ->has been NPO, on TPN. Plans for abd CT today       Nutrition Intervention: Spoke with pharmacist Valentina, increasing TPN macros slightly today. Continuing to hold lipids r/t being on lipid based medications.        PO Diet/Supplements: NPO Diet   TPN Prescription: Clinimix 5/20 1500 mL volume, hold lipids        Intake/Output:   Intake/Output Summary (Last 24 hours) at 9/1/2021 1051  Last data filed at 9/1/2021 0600  Gross per 24 hour   Intake 2731 ml   Output 4325 ml   Net -1594 ml            Height: Height: 177.8 cm (70\")   Weight: Weight: 106 kg (232 lb 12.9 oz) (09/01/21 0500)   BMI: Body mass index is 33.4 kg/m².     Results from last 7 days   Lab Units 09/01/21  0523 08/31/21  0537 08/30/21  1157   SODIUM mmol/L 150* 147* 149*   POTASSIUM mmol/L 4.7 4.5 4.4   CHLORIDE mmol/L 118* 115* 116*   CO2 mmol/L 21.0* 21.0* 22.0   BUN mg/dL 89* 81* 83*   CREATININE mg/dL 1.68* 1.84* 1.96*   CALCIUM mg/dL 8.8 8.7 8.4*   BILIRUBIN mg/dL 1.9* 2.6* 2.1*   ALK PHOS U/L 99 108 104   ALT (SGPT) U/L 31 29 27   AST (SGOT) U/L 18 20 19   GLUCOSE mg/dL 340* 177* 149*     Results from last 7 days   Lab Units 09/01/21  0523 08/31/21  0538 08/31/21  0537 08/30/21  1415 08/30/21  1157   MAGNESIUM mg/dL 3.1*  --  3.0*  --  3.0*   PHOSPHORUS mg/dL 5.0*   < > 5.5*   < > 5.4*   HEMOGLOBIN g/dL 11.6*   < >  --   --   --  "   HEMATOCRIT % 34.9*   < >  --   --   --    TRIGLYCERIDES mg/dL  --   --   --   --  157*    < > = values in this interval not displayed.     COVID19   Date Value Ref Range Status   08/29/2021 Not Detected Not Detected - Ref. Range Final     Lab Results   Component Value Date    HGBA1C 6.0 (H) 08/27/2021       Vitals:    09/01/21 0800   BP: 152/77   Pulse: 66   Resp: 20   Temp: 97.6 °F (36.4 °C)   SpO2: 93%       RD to follow up per protocol.    Electronically signed by:  Remedios Roberts RD  09/01/21 10:51 EDT

## 2021-09-01 NOTE — PROGRESS NOTES
Was able to turn the flolan pump off today: Sats maintained 93-95%.  Attempted to then place patient on travel vent to see if he could tolerate well enough to go to CT. Sats dropped to 81% in less than 5minutes. Placed patient back on 840 ventilator. Will keep flolan off as tolerated.     Marychuy Marshall, RRT  9/1/2021

## 2021-09-01 NOTE — PROGRESS NOTES
PULMONARY/CRITICAL CARE PROGRESS NOTE       NAME:     Geovanny Em   AGE:     46 y.o.   SEX:  male   : 1975       MRN:       [id number]          RM:  MALENA CVCU      ASSESSMENT     F/U: AAA dissection, acute respiratory failure    Principal Problem:    Aortic aneurysm and dissection (CMS/HCC)  Active Problems:    Acute respiratory failure with hypoxia (CMS/HCC)    Pertussis pneumonia    ARDS (adult respiratory distress syndrome)    Hypertensive urgency    Hypertension    Medically noncompliant    VANESSA (acute kidney injury)    Acute encephalopathy    Alcohol abuse    Tobacco abuse    Dissection of aorta (CMS/HCC)    COVID-19 vaccine series declined    Pneumonia due to infectious organism    MULTIDISCIPLINARY ROUNDING     -Intubated and sedated.  -FiO2 100%/+17, wean oxygen supplementation.  Attempting to wean Flolan first.  Will trial patient on travel vent once Flolan weaned.  -Possitble attempt at CT's.  -As needed paralytic based on oxygenation needs  -Continue to have difficulty with diagnostic testing requiring traveling from room due to significant instability.  -echo with bubble study negative, no evidence of pulmonary shunting.  -Starting on high dose steroids 125 mg q6h, planned wean starting on .  -Blood pressure target: SBP <140  -Hemodynamics per CTS, currently on Cliveprex.  -Does not tolerate beta-blockers due to severe bradycardia.  -Retesting for COVID-19 with BAL, keep in enhanced droplet/contact precautions, test-pending.  -Continues on TPN, would like a CT Abd prior to initiation of tube feeding.  -Monitor fluid volume status: Net IO Since Admission: 15,204.41 mL [21 1107]  -Diuresis per nephrology.  -ARDS, possible CHF  -Poor prognosis, discussed with patient's significant other at bedside.    PLAN     Aortic aneurysm and dissection, without rupture  -CT Chest, Abdomen, & Pelvis: acute aortic dissection beginning near the level of the left subclavian artery and  propagating inferiorly just past the level of the celiac artery as above. It measures approximately 30 cm in length.  -CTS consulted and managing  -Strict BP management, targeting SBP < 140  -Currently off vasoactive drips  -Doesn't tolerate beta blockers due to bradycardia.    Acute respiratory failure with hypoxia  ARDS (Acure respiratory distress syndrome)  Pertussis pneumonia  MSSA pneumonia  -Intubated 8/22 secondary to acute alcohol withdrawal  -Refractory hypoxia requiring high ventilator settings and nebulized Flolan  -Respiratory panel reviewed: Positive for Bordetella pertussis PCR  -Repeat COVID-19 swab negative  -Bronchoscopy completed on 8/23/2021, follow BAL finalized with normal respiratory irwin  -Ventilator management, wean FiO2 as tolerated  -Titrate to oxygen saturation of >92%  -Pulmicort, Duonebs scheduled and as needed.  -Sputum culture on 8/25/2021, preliminary result with 2+ Staph aureus, follow culture results  -Has received multiple antibiotics this admission  -ID consulted for further antibiotic management, now on Zosyn  -Requiring inhaled Flolan, high PEEP and high oxygen supplementation.  -Bronchoscopy 8/27, follow BAL: MSSA pneumonia, resistant only to Penicillin G  -Diurese as tolerated.  -Starting on high-dose steroids (8/31), wean pending clinical response, planned weaning to start on 9/2.  -9/2--weaning Flolan.    Suspected Pulmonary hypertension  -Revatio initiated on 8/20    Hypertensive Urgency  Essential hypertension, chronic  -Aggressive blood pressure management per CTS blood pressure parameters  -Currently on multiple medications per feeding tube  -Current regimen: Amlodipine, Coreg, hydralazine and clonidine patch  -Echo reviewed: EF 56-60%  -Avoiding Cardene due to risk of pulmonary shunting, but patient does not tolerate BB's.  -Per CTS, starting on Cleviprex (8/31)    Abdominal distention with increased bladder pressure  -Bladder pressure 22  -General surgery  "consulted  -Unsuccessful attempt to obtain abdominal CT due to acute desaturation and bradycardia  -Ultrasound abdomen reviewed, scant ascites     Acute encephalopathy  -CT reviewed: Patchy hypodensities in the right frontoparietal junction in the right parietal lobe which were nonspecific and could reflect changes of age indeterminate infarcts or chronic microvascular disease.  Recommended MRI brain stroke protocol.  Also revealed chronic appearing pontine  -Ammonia not elevated  -Consulted neurology  -Unable to obtain MRI due to high O2 needs    Medically noncompliant  -Recommend compliance with prescribed regimen    VANESSA (acute kidney injury)  -Creatinine on admission 0.98, increased to 2.6 prompting nephrology consultation  -Nephrology consulted and managing.  -Avoid NSAIDs, nephrotoxic medications, and hypotension.    Alcohol abuse  -History of drinking 12-15 beers daily  -Monitor for signs and symptoms of alcohol withdrawal  -[facility] protocol  -Thiamine, folic acid daily  -Counseled on abstinence of alcohol or substances prior to intubation.    Tobacco abuse  -Counseled on smoking cessation prior to intubation    Clinical features of DEMETRICE  -Recommend outpatient follow up in pulmonary/sleep clinic for sleep study    Code Status: Full code  VTE Prophylaxis: SCDs  PUD Prophylaxis: Pepcid  Nutrition: OG tube with tube feedings     Overall a poor prognosis with current  AAA, nursing to discuss with CTS about calling and discussing patient's prognosis involving  AAA specifically.        [place] & SUBJECTIVE     The patient is a 46-year-old male who presented to the emergency department today for evaluation of low back pain and chest pain.  He is a fairly poor historian however he is able to state that he woke up with the pain this morning and has had no relieving factors.  He states that the pain was a 3-5/10 and he describes it as \"sore\".  He is unable to state if the pain radiates from front to back or radiates to any " other areas of his body.  He denies that he has experienced nausea, vomiting, cough, expectoration, abdominal pain, diarrhea, diaphoresis, urinary hesitancy or pain with urination.  He had no aggravating factors for his discomfort.  He reports that he has previously been diagnosed with hypertension however after moving here 5 years ago he did not seek medical evaluation and has not been on prescription medications.  He denies previous cardiac history.  He does state that he is a smoker of 3/4 pack/day of cigarettes and has smoked for 30 years.     On evaluation in the emergency department the patient had an SBP >200mm/Hg and he was placed on both Cardene drip and nitroglycerin drip.  Chest x-ray revealed enlargement of the aortic notch suggesting an aneurysm.  CT of the chest per PE protocol revealed an acute aortic dissection beginning near the level of the left subclavian artery and propagating inferiorly just past the level of the celiac artery measuring approximately 30 cm in length.  The patient was admitted to the ICU for further evaluation and treatment.The emergency department physician spoke with Dr. Corrales, vascular surgery, who agreed with ICU admission and aggressive blood pressure control.  Dr. Todd, CV surgery, was also consulted and agreed with the current treatment plan.    DAILY UPDATES:  8/22: intubated and sedated  8/23: Intubated and sedated.  Refractory hypoxia  8/24: Intubated, sedated  8/25: Intubated, not following commands, mildly sedated  8/26: Intubated, sedated, not following commands.  8/27: Intubated, sedated, not following commands.  8/28: Intubated and sedated.  Continues to have high O2 needs  8/29: Intubated and sedated.  Inhaled Flolan.  Continues to have high O2 needs  8/30: No acute events.  Intubated and sedated.  Continues to receive inhaled Flolan  8/31: Issues with elevated BP overnight, Cardene restarted.  Family concerned about previous discussions involving possible  worsening hypoxia, benefit > risk, continued, but now planned change to Cleviprex.   9/1: On Cleviprex, weaning Flolan.  Intubated with FiO2 100%, PEEP +17.  Intubated, sedated.    REVIEW OF SYSTEMS:  Review of systems could not be obtained due to   patient sedation status. patient intubated.    MEDICATIONS     SCHEDULED MEDICATIONS:   albumin human, 25 g, Intravenous, Q8H  amLODIPine, 10 mg, Oral, Q24H  arformoterol, 15 mcg, Nebulization, BID - RT  budesonide, 0.5 mg, Nebulization, BID - RT  cloNIDine, 1 patch, Transdermal, Weekly  famotidine, 20 mg, Nasogastric, Daily  folic acid, 1 mg, Oral, Daily  furosemide, 20 mg, Intravenous, Q8H  hydrALAZINE, 100 mg, Nasogastric, Q8H  insulin lispro, 0-9 Units, Subcutaneous, Q6H  ipratropium-albuterol, 3 mL, Nebulization, Q4H - RT  methylPREDNISolone sodium succinate, 125 mg, Intravenous, Q6H  metoclopramide, 5 mg, Intravenous, Q8H  multivitamin, 1 tablet, Oral, Daily  piperacillin-tazobactam, 3.375 g, Intravenous, Q8H  polyethylene glycol, 17 g, Nasogastric, Daily  sildenafil, 20 mg, Oral, Q8H  sodium chloride, 3 mL, Intravenous, Q12H  thiamine, 100 mg, Nasogastric, Daily        CONTINUOUS INFUSIONS:   Adult [facility] Clinimix TPN, , Last Rate: 43 mL/hr at 08/31/21 1756  clevidipine, 2-32 mg/hr, Last Rate: 8 mg/hr (09/01/21 0856)  epoprostenol, 50 ng/kg/min (Ideal), Last Rate: 50 ng/kg/min (09/01/21 0923)  fentanyl 10 mcg/mL,  mcg/hr, Last Rate: 200 mcg/hr (09/01/21 0403)  labetalol, 0.5-2 mg/min, Last Rate: 0.5 mg/min (08/31/21 0826)  midazolam 1 mg/mL 100mL NS, 1-10 mg/hr, Last Rate: 7 mg/hr (09/01/21 0106)  Pharmacy to Dose TPN,   Pharmacy to Dose Zosyn,         PRN MEDS:  •  acetaminophen  •  bisacodyl  •  carboxymethylcellulose sod  •  clevidipine  •  dextrose  •  dextrose  •  glucagon (human recombinant)  •  hydrALAZINE  •  insulin lispro **AND** insulin lispro  •  LORazepam  •  ondansetron **OR** ondansetron  •  Pharmacy to Dose TPN  •  Pharmacy to Dose Zosyn  •   "sodium chloride  •  vecuronium    OBJECTIVE     VITAL SIGNS:  /77 (BP Location: Right arm, Patient Position: Lying)   Pulse 66   Temp 97.6 °F (36.4 °C) (Oral)   Resp 20   Ht 177.8 cm (70\")   Wt 106 kg (232 lb 12.9 oz)   SpO2 93%   BMI 33.40 kg/m²     I/O FROM PREVIOUS 3 SHIFTS:  I/O last 3 completed shifts:  In: 5051 [I.V.:4881; Other:120; NG/GT:50]  Out: 6275 [Urine:6275]    NET BALANCE SINCE ADMISSION:  Net IO Since Admission: 15,204.41 mL [09/01/21 1107]     I/O PREVIOUS 24 HOURS:   Intake/Output                       08/30/21 0701 - 08/31/21 0700 08/31/21 0701 - 09/01/21 0700     4721-5397 2593-6631 Total 1042-3234 7966-4939 Total                 Intake    I.V.  1780  2320 4100  1139  1422 2561    Other  50  -- 50  120  -- 120    Flush/ Irrigation Intake (mL) (NG/OG Tube Orogastric Center mouth) 50 -- 50 120 -- 120    NG/GT  --  -- --  --  50 50    Total Intake 1830 2320 4150 1259 1472 2731       Output    Urine  1575  1950 3525  1325  3000 4325    Total Output 1575 1950 3525 1325 3000 4325           PHYSICAL EXAM:  Constitutional:  Well developed, well nourished, no acute distress, intubated and sedated   Eyes:  PERRL, conjunctiva injected, EOM not assessed  HENT:  Atraumatic, external ears normal, nose normal. OETT. Neck-normal range of motion, no JVD, supple, trachea midline.  Right IJ central line  Respiratory: Coarse breath sounds, diminished bibasilar breath sound, scattered rhonchi without wheezes, non-labored respirations without accessory muscle use  Cardiovascular:  Normal rate normal rhythm, S1-S2, no murmurs, no gallops, no rubs   GI:  firm and distended, active bowel sounds. Morbid body habitus  : Deferred, Galvan catheter in place.  Musculoskeletal:   Generalized edema, no clubbing, no deformities  Integument:  Well hydrated, no rash   Neurologic:  intubated and sedated, no focal deficits noted  Psychiatric:   Intubated, sedated         RESULTS     LABS:  Lab Results (last 24 hours)  "    Procedure Component Value Units Date/Time    Fungus Culture - Wash, Bronchus [[id number]]  (Abnormal) Collected: 08/23/21 1131    Specimen: Wash from Bronchus Updated: 09/01/21 0939     Fungus Culture Candida species    Manual Differential [[id number]]  (Abnormal) Collected: 09/01/21 0523    Specimen: Blood Updated: 09/01/21 0738     Neutrophil % 89.0 %      Lymphocyte % 1.0 %      Monocyte % 4.0 %      Bands %  4.0 %      Metamyelocyte % 2.0 %      Neutrophils Absolute 20.93 10*3/mm3      Lymphocytes Absolute 0.23 10*3/mm3      Monocytes Absolute 0.90 10*3/mm3      nRBC 1.0 /100 WBC      RBC Morphology Normal     WBC Morphology Normal     Platelet Morphology Normal    CBC & Differential [[id number]]  (Abnormal) Collected: 09/01/21 0523    Specimen: Blood Updated: 09/01/21 0738    Narrative:      The following orders were created for panel order CBC & Differential.  Procedure                               Abnormality         Status                     ---------                               -----------         ------                     CBC Auto Differential[[id number]]        Abnormal            Final result               Scan Slide[[id number]]                                       Final result                 Please view results for these tests on the individual orders.    Scan Slide [[id number]] Collected: 09/01/21 0523    Specimen: Blood Updated: 09/01/21 0738     Scan Slide --     Comment: See Manual Differential Results       CBC Auto Differential [[id number]]  (Abnormal) Collected: 09/01/21 0523    Specimen: Blood Updated: 09/01/21 0738     WBC 22.50 10*3/mm3      RBC 3.53 10*6/mm3      Hemoglobin 11.6 g/dL      Hematocrit 34.9 %      MCV 98.9 fL      MCH 32.8 pg      MCHC 33.2 g/dL      RDW 15.3 %      RDW-SD 53.4 fl      MPV 10.8 fL      Platelets 170 10*3/mm3     Narrative:      The previously reported component NRBC is no longer being reported. Previous result was 0.2 /100 WBC (Reference Range: 0.0-0.2 /100  WBC) on 9/1/2021 at 0553 EDT.    Calcium, Ionized [[id number]]  (Abnormal) Collected: 09/01/21 0523    Specimen: Blood Updated: 09/01/21 0606     Ionized Calcium 1.33 mmol/L     Comprehensive Metabolic Panel [[id number]]  (Abnormal) Collected: 09/01/21 0523    Specimen: Blood Updated: 09/01/21 0603     Glucose 340 mg/dL      BUN 89 mg/dL      Creatinine 1.68 mg/dL      Sodium 150 mmol/L      Potassium 4.7 mmol/L      Chloride 118 mmol/L      CO2 21.0 mmol/L      Calcium 8.8 mg/dL      Total Protein 5.8 g/dL      Albumin 2.50 g/dL      ALT (SGPT) 31 U/L      AST (SGOT) 18 U/L      Alkaline Phosphatase 99 U/L      Total Bilirubin 1.9 mg/dL      eGFR Non African Amer 44 mL/min/1.73      Globulin 3.3 gm/dL      A/G Ratio 0.8 g/dL      BUN/Creatinine Ratio 53.0     Anion Gap 11.0 mmol/L     Narrative:      GFR Normal >60  Chronic Kidney Disease <60  Kidney Failure <15      Phosphorus [[id number]]  (Abnormal) Collected: 09/01/21 0523    Specimen: Blood Updated: 09/01/21 0603     Phosphorus 5.0 mg/dL     Magnesium [[id number]]  (Abnormal) Collected: 09/01/21 0523    Specimen: Blood Updated: 09/01/21 0603     Magnesium 3.1 mg/dL     POC Glucose Once [[id number]]  (Abnormal) Collected: 09/01/21 0526    Specimen: Blood Updated: 09/01/21 0527     Glucose 286 mg/dL      Comment: Serial Number: [id number]:  [id number]       Blood Gas, Venous - [[id number]]  (Abnormal) Collected: 09/01/21 0359    Specimen: Venous Blood Updated: 09/01/21 0416     Site Arterial Line     pH, Venous 7.310 pH Units      pCO2, Venous 44.9 mm Hg      pO2, Venous 83.4 mm Hg      HCO3, Venous 22.6 mmol/L      Base Excess, Venous -3.7 mmol/L      Comment: Serial Number: [id number]:  [id number]        O2 Saturation, Venous 95.1 %      CO2 Content 23.9 mmol/L      Barometric Pressure for Blood Gas --     Comment: N/A        Modality Adult Vent     FIO2 100 %      Ventilator Mode ;AC     Set Tidal Volume 520     PEEP 17    POC Glucose Once [[id number]]   (Abnormal) Collected: 09/01/21 0104    Specimen: Blood Updated: 09/01/21 0105     Glucose 266 mg/dL      Comment: Serial Number: [id number]:  [id number]       POC Glucose Once [[id number]]  (Abnormal) Collected: 08/31/21 1730    Specimen: Blood Updated: 08/31/21 1732     Glucose 227 mg/dL      Comment: Serial Number: [id number]:  [id number]              RADIOLOGY:  XR Chest 1 View    Result Date: 9/1/2021  XR CHEST 1 VW-  Date of Exam: 9/1/2021 5:31 AM  Indication: Shortness of breath; I71.00-Dissection of unspecified site of aorta; J96.01-Acute respiratory failure with hypoxia; J18.9-Pneumonia, unspecified organism.  Comparison Exams: August 31, 2021  Technique: Single AP chest radiograph  FINDINGS: An endotracheal tube has its tip in the midtrachea. An NG tube has its tip below the diaphragm. A right internal jugular catheter has its tip in the right atrium. Patchy bilateral airspace consolidations appear slightly improved on the right. The heart and mediastinal contours appear stable. The osseous structures appear intact.      1.Endotracheal tube in good position. 2.Patchy bilateral airspace consolidations appear slightly improved on the right, which could reflect pneumonia, pulmonary edema, or ARDS.  Electronically Signed By-Shahriar Sutton MD On:9/1/2021 7:52 AM This report was finalized on [id number] by  Shahriar Sutton MD.      ECHOCARDIOGRAM:  Results for orders placed during the hospital encounter of 08/21/21    Adult Transthoracic Echo Limited W/ Cont if Necessary Per Protocol    Interpretation Summary  · Saline test results are negative for right to left atrial level shunt.       I reviewed the patient's new clinical results.    This note has been scribed by me for pulmonary attending physician.    Electronically signed by DEVANTE Mancia, 09/01/21 at 11:07 EDT.     I personally have examined  and interviewed the patient. I have reviewed the history, data, problems, assessment and  plan with our NP.  Critical care time in direct medical management ( 34  ) minutes  Electronically signed by Kayla Carter MD, D,ABS, 09/01/21, 10:14 PM EDT.

## 2021-09-01 NOTE — PROGRESS NOTES
Infectious Diseases Progress Note      LOS: 11 days   Patient Care Team:  Provider, No Known as PCP - Ranulfo Marinelli MD as Consulting Physician (Nephrology)    Chief Complaint: Intubated on the ventilator    Subjective     The patient had no high-grade fever during the last 24 hours.  The patient remained intubated on the ventilator 100% FiO2.  The patient is currently off vasopressors and is just on sedation.    Review of Systems:   Review of Systems   Unable to perform ROS: Intubated        Objective     Vital Signs  Temp:  [96.3 °F (35.7 °C)-98.5 °F (36.9 °C)] 98.5 °F (36.9 °C)  Heart Rate:  [61-72] 67  Resp:  [20-28] 28  BP: (133-167)/(67-78) 136/69  Arterial Line BP: (144-169)/(60-70) 158/69  FiO2 (%):  [100 %] 100 %    Physical Exam:  Physical Exam  Constitutional:       Comments: Intubated and sedated on the ventilator   HENT:      Head: Normocephalic and atraumatic.      Mouth/Throat:      Mouth: Mucous membranes are moist.   Eyes:      Pupils: Pupils are equal, round, and reactive to light.   Cardiovascular:      Rate and Rhythm: Normal rate and regular rhythm.   Pulmonary:      Breath sounds: Rales present.   Abdominal:      General: There is distension.      Comments: Less distention abdomen today examination   Musculoskeletal:      Cervical back: Neck supple.      Right lower leg: Edema present.      Left lower leg: Edema present.   Neurological:      Comments: Intubated and sedated          Results Review:    I have reviewed all clinical data, test, lab, and imaging results.     Radiology  XR Chest 1 View    Result Date: 9/1/2021  XR CHEST 1 VW-  Date of Exam: 9/1/2021 5:31 AM  Indication: Shortness of breath; I71.00-Dissection of unspecified site of aorta; J96.01-Acute respiratory failure with hypoxia; J18.9-Pneumonia, unspecified organism.  Comparison Exams: August 31, 2021  Technique: Single AP chest radiograph  FINDINGS: An endotracheal tube has its tip in the midtrachea. An NG tube has  its tip below the diaphragm. A right internal jugular catheter has its tip in the right atrium. Patchy bilateral airspace consolidations appear slightly improved on the right. The heart and mediastinal contours appear stable. The osseous structures appear intact.      1.Endotracheal tube in good position. 2.Patchy bilateral airspace consolidations appear slightly improved on the right, which could reflect pneumonia, pulmonary edema, or ARDS.  Electronically Signed By-Shahriar Sutton MD On:9/1/2021 7:52 AM This report was finalized on [id number] by  Shahriar Sutton MD.      Cardiology    Laboratory    Results from last 7 days   Lab Units 09/01/21  0523 08/31/21  0538 08/30/21  0325 08/29/21  0518 08/28/21  0244 08/27/21  0448 08/26/21  1549 08/26/21  0334   WBC 10*3/mm3 22.50* 21.10* 21.50* 20.00* 16.00* 16.30*  --  14.50*   HEMOGLOBIN g/dL 11.6* 11.7* 11.4* 10.9* 11.7* 11.8*  --  12.0*   HEMOGLOBIN, POC g/dL  --   --   --   --   --   --  12.7  --    HEMATOCRIT % 34.9* 36.3* 35.1* 33.4* 35.1* 35.8*  --  35.8*   HEMATOCRIT POC %  --   --   --   --   --   --  37*  --    PLATELETS 10*3/mm3 170 169 147 145 143 145  --  155     Results from last 7 days   Lab Units 09/01/21  0523 08/31/21  0537 08/30/21  1157 08/30/21  0325 08/29/21  1246 08/29/21  0518 08/28/21  0244 08/27/21  0809 08/27/21  0448 08/27/21  0448   SODIUM mmol/L 150* 147* 149* 148*  --  150* 151*  --   --  148*   POTASSIUM mmol/L 4.7 4.5 4.4 4.6  --  4.1 4.1  --   --  4.5   CHLORIDE mmol/L 118* 115* 116* 116*  --  118* 118*  --   --  113*   CO2 mmol/L 21.0* 21.0* 22.0 21.0*  --  20.0* 20.0*  --   --  23.0   BUN mg/dL 89* 81* 83* 85*  --  85* 65*  --   --  51*   CREATININE mg/dL 1.68* 1.84* 1.96* 2.06*  --  2.23* 1.90*  --   --  1.54*   GLUCOSE mg/dL 340* 177* 149* 150*  --  147* 158*  --   --  225*   ALBUMIN g/dL 2.50* 2.50* 2.50* 2.50* 2.50* 2.50* 2.80*  --    < > 3.20*   BILIRUBIN mg/dL 1.9* 2.6* 2.1* 1.9* 1.3*  --  0.9  --   --   --    ALK PHOS U/L  99 108 104 106 111  --  143*  --   --   --    AST (SGOT) U/L 18 20 19 14 13  --  26  --   --   --    ALT (SGPT) U/L 31 29 27 27 26  --  43*  --   --   --    AMMONIA umol/L  --   --   --   --   --   --   --  33  --   --    CALCIUM mg/dL 8.8 8.7 8.4* 8.5*  --  8.2* 8.4*  --   --  8.7    < > = values in this interval not displayed.                 Microbiology   Microbiology Results (last 10 days)     Procedure Component Value - Date/Time    AFB Culture - Wash, Bronchus [[id number]] Collected: 08/31/21 0909    Lab Status: Preliminary result Specimen: Wash from Bronchus Updated: 09/01/21 1221     AFB Stain No acid fast bacilli seen    Respiratory Culture - Wash, Bronchus [[id number]] Collected: 08/31/21 0909    Lab Status: Preliminary result Specimen: Wash from Bronchus Updated: 09/01/21 1131     Respiratory Culture No growth     Gram Stain Few (2+) WBCs per low power field      Rare (1+) Mixed bacterial morphotypes seen on Gram Stain    Respiratory Panel, PCR (WITHOUT COVID) - Wash, Bronchus [[id number]]  (Normal) Collected: 08/31/21 0909    Lab Status: Final result Specimen: Wash from Bronchus Updated: 08/31/21 1056     ADENOVIRUS, PCR Not Detected     Coronavirus 229E Not Detected     Coronavirus HKU1 Not Detected     Coronavirus NL63 Not Detected     Coronavirus OC43 Not Detected     Human Metapneumovirus Not Detected     Human Rhinovirus/Enterovirus Not Detected     Influenza B PCR Not Detected     Parainfluenza Virus 1 Not Detected     Parainfluenza Virus 2 Not Detected     Parainfluenza Virus 3 Not Detected     Parainfluenza Virus 4 Not Detected     Bordetella pertussis pcr Not Detected     Chlamydophila pneumoniae PCR Not Detected     Mycoplasma pneumo by PCR Not Detected     Influenza A PCR Not Detected     RSV, PCR Not Detected     Bordetella parapertussis PCR Not Detected    Narrative:      The coronavirus on the RVP is NOT COVID-19 and is NOT indicative of infection with COVID-19.    In the setting of a  positive respiratory panel with a viral infection PLUS a negative procalcitonin without other underlying concern for bacterial infection, consider observing off antibiotics or discontinuation of antibiotics and continue supportive care. If the respiratory panel is positive for atypical bacterial infection (Bordetella pertussis, Chlamydophila pneumoniae, or Mycoplasma pneumoniae), consider antibiotic de-escalation to target atypical bacterial infection.    Blood Culture - Blood, Hand, Right [[id number]] Collected: 08/30/21 1415    Lab Status: Preliminary result Specimen: Blood from Hand, Right Updated: 09/01/21 1430     Blood Culture No growth at 2 days    Blood Culture - Blood, Blood, Central Line [[id number]] Collected: 08/30/21 1333    Lab Status: Preliminary result Specimen: Blood, Central Line Updated: 09/01/21 1345     Blood Culture No growth at 2 days    S. Pneumo Ag Urine or CSF - Urine, Urine, Catheter [[id number]]  (Normal) Collected: 08/29/21 1246    Lab Status: Final result Specimen: Urine, Catheter Updated: 08/29/21 1315     Strep Pneumo Ag Negative    Legionella Antigen, Urine - Urine, Urine, Catheter [[id number]]  (Normal) Collected: 08/29/21 1246    Lab Status: Final result Specimen: Urine, Catheter Updated: 08/29/21 1315     LEGIONELLA ANTIGEN, URINE Negative    Respiratory Panel PCR w/COVID-19(SARS-CoV-2) MARY KAY/KERRIE/MALENA/PAD/COR/MAD/BUZZ In-House, NP Swab in UTM/VTM, 3-4 HR TAT - Swab, Nasopharynx [[id number]]  (Normal) Collected: 08/29/21 1246    Lab Status: Final result Specimen: Swab from Nasopharynx Updated: 08/29/21 1355     ADENOVIRUS, PCR Not Detected     Coronavirus 229E Not Detected     Coronavirus HKU1 Not Detected     Coronavirus NL63 Not Detected     Coronavirus OC43 Not Detected     COVID19 Not Detected     Human Metapneumovirus Not Detected     Human Rhinovirus/Enterovirus Not Detected     Influenza A PCR Not Detected     Influenza B PCR Not Detected     Parainfluenza Virus 1 Not Detected      Parainfluenza Virus 2 Not Detected     Parainfluenza Virus 3 Not Detected     Parainfluenza Virus 4 Not Detected     RSV, PCR Not Detected     Bordetella pertussis pcr Not Detected     Bordetella parapertussis PCR Not Detected     Chlamydophila pneumoniae PCR Not Detected     Mycoplasma pneumo by PCR Not Detected    Narrative:      In the setting of a positive respiratory panel with a viral infection PLUS a negative procalcitonin without other underlying concern for bacterial infection, consider observing off antibiotics or discontinuation of antibiotics and continue supportive care. If the respiratory panel is positive for atypical bacterial infection (Bordetella pertussis, Chlamydophila pneumoniae, or Mycoplasma pneumoniae), consider antibiotic de-escalation to target atypical bacterial infection.    BAL Culture, Quantitative - Lavage, Bronchus [[id number]]  (Abnormal)  (Susceptibility) Collected: 08/27/21 1233    Lab Status: Final result Specimen: Lavage from Bronchus Updated: 08/29/21 1006     BAL Culture 25,000 CFU/mL Staphylococcus aureus      No Normal Respiratory Muna     Gram Stain Few (2+) WBCs per low power field      Rare (1+) Gram positive cocci    Susceptibility      Staphylococcus aureus      SATISH      Clindamycin Susceptible      Oxacillin Susceptible      Penicillin G Resistant      Tetracycline Susceptible      Trimethoprim + Sulfamethoxazole Susceptible      Vancomycin Susceptible               Linear View                   Respiratory Culture - Sputum, ET Suction [[id number]]  (Abnormal)  (Susceptibility) Collected: 08/25/21 0215    Lab Status: Final result Specimen: Sputum from ET Suction Updated: 08/27/21 0919     Respiratory Culture Light growth (2+) Staphylococcus aureus      Rare Normal Respiratory Muna     Gram Stain Few (2+) Epithelial cells per low power field      Moderate (3+) WBCs per low power field      Few (2+) Mixed bacterial morphotypes seen on Gram Stain    Susceptibility       Staphylococcus aureus      SATISH      Clindamycin Susceptible      Oxacillin Susceptible      Penicillin G Resistant      Tetracycline Susceptible      Trimethoprim + Sulfamethoxazole Susceptible      Vancomycin Susceptible               Linear View                   Blood Culture - Blood, Blood, Central Line [[id number]] Collected: 08/25/21 0054    Lab Status: Final result Specimen: Blood, Central Line Updated: 08/30/21 0115     Blood Culture No growth at 5 days    Blood Culture - Blood, Blood, Arterial Line [[id number]] Collected: 08/25/21 0054    Lab Status: Final result Specimen: Blood, Arterial Line Updated: 08/30/21 0115     Blood Culture No growth at 5 days    Fungus Culture - Wash, Bronchus [[id number]]  (Abnormal) Collected: 08/23/21 1131    Lab Status: Preliminary result Specimen: Wash from Bronchus Updated: 09/01/21 0939     Fungus Culture Candida species    AFB Culture - Wash, Bronchus [[id number]] Collected: 08/23/21 1131    Lab Status: Preliminary result Specimen: Wash from Bronchus Updated: 08/30/21 1300     AFB Culture No AFB isolated at 1 week     AFB Stain No acid fast bacilli seen    Respiratory Culture - Wash, Bronchus [[id number]] Collected: 08/23/21 1131    Lab Status: Final result Specimen: Wash from Bronchus Updated: 08/25/21 1230     Respiratory Culture Light growth (2+) Normal Respiratory Muna     Gram Stain Moderate (3+) WBCs per low power field      Moderate (3+) Gram positive cocci in pairs and chains      Rare (1+) Gram positive cocci in clusters    Respiratory Panel, PCR (WITHOUT COVID) - Wash, Bronchus [[id number]]  (Abnormal) Collected: 08/23/21 1131    Lab Status: Final result Specimen: Wash from Bronchus Updated: 08/23/21 1653     ADENOVIRUS, PCR Not Detected     Coronavirus 229E Not Detected     Coronavirus HKU1 Not Detected     Coronavirus NL63 Not Detected     Coronavirus OC43 Not Detected     Human Metapneumovirus Not Detected     Human Rhinovirus/Enterovirus Not Detected      "Influenza B PCR Not Detected     Parainfluenza Virus 1 Not Detected     Parainfluenza Virus 2 Not Detected     Parainfluenza Virus 3 Not Detected     Parainfluenza Virus 4 Not Detected     Bordetella pertussis pcr Detected     Chlamydophila pneumoniae PCR Not Detected     Mycoplasma pneumo by PCR Not Detected     Influenza A PCR Not Detected     RSV, PCR Not Detected     Bordetella parapertussis PCR Not Detected    Narrative:      The coronavirus on the RVP is NOT COVID-19 and is NOT indicative of infection with COVID-19.    In the setting of a positive respiratory panel with a viral infection PLUS a negative procalcitonin without other underlying concern for bacterial infection, consider observing off antibiotics or discontinuation of antibiotics and continue supportive care. If the respiratory panel is positive for atypical bacterial infection (Bordetella pertussis, Chlamydophila pneumoniae, or Mycoplasma pneumoniae), consider antibiotic de-escalation to target atypical bacterial infection.    Histoplasma Antigen, CSF or BAL - Wash, Bronchus [[id number]] Collected: 08/23/21 1131    Lab Status: Final result Specimen: Wash from Bronchus Updated: 08/26/21 1710     Result None Detected ng/mL      Comment:                                           None Detected        Interpretation Negative     Comment: Positive results reported in ng/mL from 0.20 ng/mL to 20.00 ng/mL  Positive results above 20.00 ng/mL are reported as \"Above the  Limit of Quantification\"       Narrative:      Performed at:  [address] [place] [address] [facility]  [address] [place] [address] [place]  [id number]  : Al Heredia MD, Phone:  [phone]    Cytomegalovirus (CMV) By PCR - Wash, Bronchus [[id number]] Collected: 08/23/21 1131    Lab Status: Final result Specimen: Wash from Bronchus Updated: 08/26/21 0209     Specimen Source Comment     Comment: BRONCH WASH CARBOWAX        Cytomegalovirus PCR Negative     Comment: -------------------ADDITIONAL " INFORMATION-------------------  This test was developed and its performance characteristics  determined by [facility] in a manner consistent with CLIA  requirements. This test has not been cleared or approved by  the [place]. Food and Drug Administration.       Narrative:      Performed at:  01 - [facility] [address]  [id number]  : Rahul Recinos , Phone:  [phone]    Pneumocystis PCR - Wash, Bronchus [[id number]] Collected: 08/23/21 1131    Lab Status: Final result Specimen: Wash from Bronchus Updated: 08/27/21 1111     Pneumocystis jiroveci DNA Negative     Comment: -------------------ADDITIONAL INFORMATION-------------------  This test was developed and its performance characteristics  determined by [facility] in a manner consistent with CLIA  requirements. This test has not been cleared or approved by  the [place]. Food and Drug Administration.  REFERENCE RANGE: Not Applicable        Specimen Source Comment     Comment: BRONCH WASH       Narrative:      Performed at:  01 - [facility] [address]  [id number]  : Rahul Recinos , Phone:  [phone]          Medication Review:       Schedule Meds  albumin human, 25 g, Intravenous, Q8H  amLODIPine, 10 mg, Oral, Q24H  arformoterol, 15 mcg, Nebulization, BID - RT  budesonide, 0.5 mg, Nebulization, BID - RT  cloNIDine, 1 patch, Transdermal, Weekly  famotidine, 20 mg, Nasogastric, Daily  folic acid, 1 mg, Oral, Daily  furosemide, 20 mg, Intravenous, Q8H  hydrALAZINE, 100 mg, Nasogastric, Q8H  insulin lispro, 0-24 Units, Subcutaneous, Q6H  ipratropium-albuterol, 3 mL, Nebulization, Q4H - RT  methylPREDNISolone sodium succinate, 125 mg, Intravenous, Q6H  metoclopramide, 5 mg, Intravenous, Q8H  multivitamin, 1 tablet, Oral, Daily  piperacillin-tazobactam, 3.375 g, Intravenous, Q8H  polyethylene glycol, 17 g, Nasogastric, Daily  sildenafil, 20 mg, Oral, Q8H  sodium  chloride, 3 mL, Intravenous, Q12H  thiamine, 100 mg, Nasogastric, Daily        Infusion Meds  Adult Central Clinimix TPN, , Last Rate: 43 mL/hr at 08/31/21 1756  Adult Central [facility] TPN,   clevidipine, 2-32 mg/hr, Last Rate: 10 mg/hr (09/01/21 1347)  epoprostenol, 50 ng/kg/min (Ideal), Last Rate: 50 ng/kg/min (09/01/21 0923)  fentanyl 10 mcg/mL,  mcg/hr, Last Rate: 200 mcg/hr (09/01/21 0403)  labetalol, 0.5-2 mg/min, Last Rate: 0.5 mg/min (08/31/21 0826)  midazolam 1 mg/mL 100mL NS, 1-10 mg/hr, Last Rate: 7 mg/hr (09/01/21 1333)  Pharmacy to Dose TPN,   Pharmacy to Dose Zosyn,         PRN Meds  •  acetaminophen  •  bisacodyl  •  carboxymethylcellulose sod  •  clevidipine  •  dextrose  •  dextrose  •  glucagon (human recombinant)  •  hydrALAZINE  •  insulin lispro **AND** insulin lispro  •  LORazepam  •  ondansetron **OR** ondansetron  •  Pharmacy to Dose TPN  •  Pharmacy to Dose Zosyn  •  sodium chloride  •  vecuronium        Assessment/Plan       Antimicrobial Therapy   1.  IV Zosyn      day  2.      Day  3.      Day  4.      Day  5.      Day    Assessment     Severe respiratory failure on the ventilator 100% FiO2.  Patient was screened negative twice for Covid 19.  The patient is probably in ARDS.  The other possibility is pulmonary edema     The patient was admitted with chest pain and was found to have type B dissection of thoracic aortic aneurysm     The patient had history of alcohol abuse.  The patient developed alcohol withdrawal 1 week ago     Distended abdomen.  The etiology is not clear.  Bedside ultrasound showed minimal fluid.  Lactate was normal     History of alcohol abuse.  Patient was drinking 15 cans of beer every day     Positive BAL for Bordetella pertussis PCR on August 23, 2021.  Patient was treated with 5 days of azithromycin     Hospital-acquired pneumonia with methicillin susceptible Staphylococcus aureus from sputum culture done on August 25, 2021.  The patient had bilateral lower  lobe infiltrates on the most recent CAT scan.  On admission patient had no pneumonia     Malignant hypertension.  Patient is currently on labetalol    The patient will be placed on Covid isolation waiting on BAL results for Covid PCR        Plan     Continue IV Zosyn 3.375 g every 8 hours  Supportive care  A.m. labs  Prognosis is very guarded  Possible CT of the abdomen and pelvis, chest, and head if patient is able to be stabilized    The above note was transcribed for Dr. Abbott-physical exam and review of systems were performed by him      Tarah Mathews, APRN  09/01/21  14:47 EDT      Note is dictated utilizing voice recognition software/Dragon

## 2021-09-02 NOTE — PROGRESS NOTES
Infectious Diseases Progress Note      LOS: 12 days   Patient Care Team:  Provider, No Known as PCP - Ranulfo Marinelli MD as Consulting Physician (Nephrology)    Chief Complaint: Intubated on the ventilator    Subjective     The patient had no high-grade fever during the last 24 hours.  The patient remained intubated on the ventilator 100% FiO2.  The patient is currently off vasopressors    Review of Systems:   Review of Systems   Unable to perform ROS: Intubated        Objective     Vital Signs  Temp:  [98.5 °F (36.9 °C)-100.8 °F (38.2 °C)] 99.4 °F (37.4 °C)  Heart Rate:  [65-73] 66  Resp:  [28-30] 30  BP: (131-172)/(59-78) 131/64  Arterial Line BP: (152-188)/() 159/67  FiO2 (%):  [85 %-100 %] 85 %    Physical Exam:  Physical Exam  Constitutional:       Comments: Intubated and sedated on the ventilator   HENT:      Head: Normocephalic and atraumatic.      Mouth/Throat:      Mouth: Mucous membranes are moist.   Eyes:      Pupils: Pupils are equal, round, and reactive to light.   Cardiovascular:      Rate and Rhythm: Normal rate and regular rhythm.   Pulmonary:      Breath sounds: Rales present.   Abdominal:      General: There is distension.      Comments: Less distention abdomen today examination   Musculoskeletal:      Cervical back: Neck supple.      Right lower leg: Edema present.      Left lower leg: Edema present.   Neurological:      Comments: Intubated and sedated          Results Review:    I have reviewed all clinical data, test, lab, and imaging results.     Radiology  XR Chest 1 View    Result Date: 9/2/2021  Examination: XR CHEST 1 VW-  Date of Exam: 9/2/2021 10:23 AM  Indication: worsening hypoxia, f/u ptx; I71.00-Dissection of unspecified site of aorta; J96.01-Acute respiratory failure with hypoxia; J18.9-Pneumonia, unspecified organism.  Comparison: September 1, 2021  Technique: 1 view of the chest  Findings: There is a right internal jugular central line with the tip in the superior  vena cava. There is an endotracheal tube at the level of clavicles. There is a nasogastric tube below the diaphragm. There are diffuse bilateral alveolar airspace opacities greater on the left than the right and appearing similar to the prior study. There is no definite pneumothorax.      Diffuse bilateral infiltrates persist greater on the left than the right. Aeration is similar to the prior study. Tubes and lines appear properly positioned.  Electronically Signed By-Roel Morin MD On:9/2/2021 10:43 AM This report was finalized on [id number] by  Roel Morin MD.      Cardiology    Laboratory    Results from last 7 days   Lab Units 09/02/21  0301 09/01/21  0523 08/31/21  0538 08/30/21  0325 08/29/21  0518 08/28/21  0244 08/27/21  0448   WBC 10*3/mm3 22.20* 22.50* 21.10* 21.50* 20.00* 16.00* 16.30*   HEMOGLOBIN g/dL 10.5* 11.6* 11.7* 11.4* 10.9* 11.7* 11.8*   HEMATOCRIT % 32.8* 34.9* 36.3* 35.1* 33.4* 35.1* 35.8*   PLATELETS 10*3/mm3 176 170 169 147 145 143 145     Results from last 7 days   Lab Units 09/02/21  0301 09/01/21  0523 08/31/21  0537 08/30/21  1157 08/30/21  0325 08/29/21  1246 08/29/21  0518 08/28/21  0244 08/28/21  0244 08/27/21  0809 08/27/21  0448   SODIUM mmol/L 151* 150* 147* 149* 148*  --  150*  --  151*  --    < >   POTASSIUM mmol/L 4.4 4.7 4.5 4.4 4.6  --  4.1  --  4.1  --    < >   CHLORIDE mmol/L 118* 118* 115* 116* 116*  --  118*  --  118*  --    < >   CO2 mmol/L 21.0* 21.0* 21.0* 22.0 21.0*  --  20.0*  --  20.0*  --    < >   BUN mg/dL 101* 89* 81* 83* 85*  --  85*  --  65*  --    < >   CREATININE mg/dL 1.89* 1.68* 1.84* 1.96* 2.06*  --  2.23*  --  1.90*  --    < >   GLUCOSE mg/dL 450* 340* 177* 149* 150*  --  147*  --  158*  --    < >   ALBUMIN g/dL 3.20* 2.50* 2.50* 2.50* 2.50* 2.50* 2.50*   < > 2.80*  --    < >   BILIRUBIN mg/dL 1.7* 1.9* 2.6* 2.1* 1.9* 1.3*  --   --  0.9  --   --    ALK PHOS U/L 93 99 108 104 106 111  --   --  143*  --   --    AST (SGOT) U/L 18 18 20 19 14 13  --   --   26  --   --    ALT (SGPT) U/L 35 31 29 27 27 26  --   --  43*  --   --    AMMONIA umol/L  --   --   --   --   --   --   --   --   --  33  --    CALCIUM mg/dL 9.4 8.8 8.7 8.4* 8.5*  --  8.2*  --  8.4*  --    < >    < > = values in this interval not displayed.                 Microbiology   Microbiology Results (last 10 days)     Procedure Component Value - Date/Time    AFB Culture - Wash, Bronchus [[id number]] Collected: 08/31/21 0909    Lab Status: Preliminary result Specimen: Wash from Bronchus Updated: 09/01/21 1221     AFB Stain No acid fast bacilli seen    Respiratory Culture - Wash, Bronchus [[id number]] Collected: 08/31/21 0909    Lab Status: Final result Specimen: Wash from Bronchus Updated: 09/02/21 1132     Respiratory Culture No growth     Gram Stain Few (2+) WBCs per low power field      Rare (1+) Mixed bacterial morphotypes seen on Gram Stain    Respiratory Panel, PCR (WITHOUT COVID) - Wash, Bronchus [[id number]]  (Normal) Collected: 08/31/21 0909    Lab Status: Final result Specimen: Wash from Bronchus Updated: 08/31/21 1056     ADENOVIRUS, PCR Not Detected     Coronavirus 229E Not Detected     Coronavirus HKU1 Not Detected     Coronavirus NL63 Not Detected     Coronavirus OC43 Not Detected     Human Metapneumovirus Not Detected     Human Rhinovirus/Enterovirus Not Detected     Influenza B PCR Not Detected     Parainfluenza Virus 1 Not Detected     Parainfluenza Virus 2 Not Detected     Parainfluenza Virus 3 Not Detected     Parainfluenza Virus 4 Not Detected     Bordetella pertussis pcr Not Detected     Chlamydophila pneumoniae PCR Not Detected     Mycoplasma pneumo by PCR Not Detected     Influenza A PCR Not Detected     RSV, PCR Not Detected     Bordetella parapertussis PCR Not Detected    Narrative:      The coronavirus on the RVP is NOT COVID-19 and is NOT indicative of infection with COVID-19.    In the setting of a positive respiratory panel with a viral infection PLUS a negative procalcitonin  without other underlying concern for bacterial infection, consider observing off antibiotics or discontinuation of antibiotics and continue supportive care. If the respiratory panel is positive for atypical bacterial infection (Bordetella pertussis, Chlamydophila pneumoniae, or Mycoplasma pneumoniae), consider antibiotic de-escalation to target atypical bacterial infection.    COVID-19,[facility] ROUTINE, NP/OP SWAB IN TRANSPORT MEDIA OR ESWAB 72 HR TAT - Wash, Bronchus [[id number]] Collected: 08/31/21 0909    Lab Status: Final result Specimen: Wash from Bronchus Updated: 09/02/21 0338     SARS-CoV-2, DISHA Not Detected     Comment: This nucleic acid amplification test was developed and its performance  characteristics determined by [facility]. Nucleic acid  amplification tests include RT-PCR and TMA. This test has not been  FDA cleared or approved. This test has been authorized by FDA under  an Emergency Use Authorization (EUA). This test is only authorized  for the duration of time the declaration that circumstances exist  justifying the authorization of the emergency use of in vitro  diagnostic tests for detection of SARS-CoV-2 virus and/or diagnosis  of COVID-19 infection under section 564(b)(1) of the Act, 21 U.S.C.  360bbb-3(b) (1), unless the authorization is terminated or revoked  sooner.  When diagnostic testing is negative, the possibility of a false  negative result should be considered in the context of a patient's  recent exposures and the presence of clinical signs and symptoms  consistent with COVID-19. An individual without symptoms of COVID-19  and who is not shedding SARS-CoV-2 virus would expect to have a  negative (not detected) result in this assay.       Narrative:      Performed at:  01 - [facility]  [address], [place] [place] [facility], [place]  [id number]  : Susannah Tesfaye [facility], Phone:  [phone]    Blood Culture - Blood, Hand, Right [[id number]] Collected: 08/30/21 1415    Lab Status:  Preliminary result Specimen: Blood from Hand, Right Updated: 09/01/21 1430     Blood Culture No growth at 2 days    Blood Culture - Blood, Blood, Central Line [[id number]] Collected: 08/30/21 1333    Lab Status: Preliminary result Specimen: Blood, Central Line Updated: 09/01/21 1345     Blood Culture No growth at 2 days    S. Pneumo Ag Urine or CSF - Urine, Urine, Catheter [[id number]]  (Normal) Collected: 08/29/21 1246    Lab Status: Final result Specimen: Urine, Catheter Updated: 08/29/21 1315     Strep Pneumo Ag Negative    Legionella Antigen, Urine - Urine, Urine, Catheter [[id number]]  (Normal) Collected: 08/29/21 1246    Lab Status: Final result Specimen: Urine, Catheter Updated: 08/29/21 1315     LEGIONELLA ANTIGEN, URINE Negative    Respiratory Panel PCR w/COVID-19(SARS-CoV-2) MARY KAY/KERRIE/MALENA/PAD/COR/MAD/BUZZ In-House, NP Swab in UTM/VTM, 3-4 HR TAT - Swab, Nasopharynx [[id number]]  (Normal) Collected: 08/29/21 1246    Lab Status: Final result Specimen: Swab from Nasopharynx Updated: 08/29/21 1355     ADENOVIRUS, PCR Not Detected     Coronavirus 229E Not Detected     Coronavirus HKU1 Not Detected     Coronavirus NL63 Not Detected     Coronavirus OC43 Not Detected     COVID19 Not Detected     Human Metapneumovirus Not Detected     Human Rhinovirus/Enterovirus Not Detected     Influenza A PCR Not Detected     Influenza B PCR Not Detected     Parainfluenza Virus 1 Not Detected     Parainfluenza Virus 2 Not Detected     Parainfluenza Virus 3 Not Detected     Parainfluenza Virus 4 Not Detected     RSV, PCR Not Detected     Bordetella pertussis pcr Not Detected     Bordetella parapertussis PCR Not Detected     Chlamydophila pneumoniae PCR Not Detected     Mycoplasma pneumo by PCR Not Detected    Narrative:      In the setting of a positive respiratory panel with a viral infection PLUS a negative procalcitonin without other underlying concern for bacterial infection, consider observing off antibiotics or discontinuation of  antibiotics and continue supportive care. If the respiratory panel is positive for atypical bacterial infection (Bordetella pertussis, Chlamydophila pneumoniae, or Mycoplasma pneumoniae), consider antibiotic de-escalation to target atypical bacterial infection.    BAL Culture, Quantitative - Lavage, Bronchus [[id number]]  (Abnormal)  (Susceptibility) Collected: 08/27/21 1233    Lab Status: Final result Specimen: Lavage from Bronchus Updated: 08/29/21 1006     BAL Culture 25,000 CFU/mL Staphylococcus aureus      No Normal Respiratory Muna     Gram Stain Few (2+) WBCs per low power field      Rare (1+) Gram positive cocci    Susceptibility      Staphylococcus aureus      SATISH      Clindamycin Susceptible      Oxacillin Susceptible      Penicillin G Resistant      Tetracycline Susceptible      Trimethoprim + Sulfamethoxazole Susceptible      Vancomycin Susceptible               Linear View                   Respiratory Culture - Sputum, ET Suction [[id number]]  (Abnormal)  (Susceptibility) Collected: 08/25/21 0215    Lab Status: Final result Specimen: Sputum from ET Suction Updated: 08/27/21 0919     Respiratory Culture Light growth (2+) Staphylococcus aureus      Rare Normal Respiratory Muna     Gram Stain Few (2+) Epithelial cells per low power field      Moderate (3+) WBCs per low power field      Few (2+) Mixed bacterial morphotypes seen on Gram Stain    Susceptibility      Staphylococcus aureus      SATISH      Clindamycin Susceptible      Oxacillin Susceptible      Penicillin G Resistant      Tetracycline Susceptible      Trimethoprim + Sulfamethoxazole Susceptible      Vancomycin Susceptible               Linear View                   Blood Culture - Blood, Blood, Central Line [[id number]] Collected: 08/25/21 0054    Lab Status: Final result Specimen: Blood, Central Line Updated: 08/30/21 0115     Blood Culture No growth at 5 days    Blood Culture - Blood, Blood, Arterial Line [[id number]] Collected: 08/25/21 0054     Lab Status: Final result Specimen: Blood, Arterial Line Updated: 08/30/21 0115     Blood Culture No growth at 5 days          Medication Review:       Schedule Meds  amLODIPine, 10 mg, Oral, Q24H  arformoterol, 15 mcg, Nebulization, BID - RT  budesonide, 0.5 mg, Nebulization, BID - RT  cloNIDine, 1 patch, Transdermal, Weekly  famotidine, 20 mg, Nasogastric, Daily  folic acid, 1 mg, Oral, Daily  hydrALAZINE, 100 mg, Nasogastric, Q8H  ipratropium-albuterol, 3 mL, Nebulization, Q4H - RT  methylPREDNISolone sodium succinate, 40 mg, Intravenous, Q8H  metoclopramide, 5 mg, Intravenous, Q8H  multivitamin, 1 tablet, Oral, Daily  piperacillin-tazobactam, 3.375 g, Intravenous, Q8H  polyethylene glycol, 17 g, Nasogastric, Daily  sildenafil, 20 mg, Oral, Q8H  sodium chloride, 3 mL, Intravenous, Q12H  thiamine, 100 mg, Nasogastric, Daily        Infusion Meds  Adult Central Clinimix TPN, , Last Rate: 63 mL/hr at 09/01/21 1834  clevidipine, 2-32 mg/hr, Last Rate: 32 mg/hr (09/02/21 1055)  epoprostenol, 50 ng/kg/min (Ideal), Last Rate: 50 ng/kg/min (09/01/21 0923)  fentanyl 10 mcg/mL,  mcg/hr, Last Rate: 200 mcg/hr (09/02/21 0702)  insulin, 1-20 Units/hr  labetalol, 0.5-2 mg/min, Last Rate: 0.5 mg/min (08/31/21 0826)  midazolam 1 mg/mL 100mL NS, 1-10 mg/hr, Last Rate: 6 mg/hr (09/02/21 0051)  Pharmacy to Dose TPN,   Pharmacy to Dose Zosyn,         PRN Meds  •  acetaminophen  •  bisacodyl  •  carboxymethylcellulose sod  •  clevidipine  •  dextrose  •  dextrose  •  dextrose  •  glucagon (human recombinant)  •  hydrALAZINE  •  LORazepam  •  ondansetron **OR** ondansetron  •  Pharmacy to Dose TPN  •  Pharmacy to Dose Zosyn  •  sodium chloride  •  vecuronium        Assessment/Plan       Antimicrobial Therapy   1.  IV Zosyn      day  2.      Day  3.      Day  4.      Day  5.      Day    Assessment     Severe respiratory failure on the ventilator 100% FiO2.  Patient was screened negative twice for Covid 19.  The patient is probably  in ARDS.  The other possibility is pulmonary edema     The patient was admitted with chest pain and was found to have type B dissection of thoracic aortic aneurysm     The patient had history of alcohol abuse.  The patient developed alcohol withdrawal 1 week ago     Distended abdomen.  The etiology is not clear.  Bedside ultrasound showed minimal fluid.  Lactate was normal     History of alcohol abuse.  Patient was drinking 15 cans of beer every day     Positive BAL for Bordetella pertussis PCR on August 23, 2021.  Patient was treated with 5 days of azithromycin     Hospital-acquired pneumonia with methicillin susceptible Staphylococcus aureus from sputum culture done on August 25, 2021.  The patient had bilateral lower lobe infiltrates on the most recent CAT scan.  On admission patient had no pneumonia     Malignant hypertension.    The patient was previously on labetalol drip            Plan     Continue IV Zosyn 3.375 g every 8 hours  Supportive care  A.m. labs  Prognosis is very guarded  Possible CT of the abdomen and pelvis, chest, and head if patient is able to be stabilized        Ronit Abbott MD  09/02/21  11:49 EDT      Note is dictated utilizing voice recognition software/Dragon

## 2021-09-02 NOTE — CASE MANAGEMENT/SOCIAL WORK
Continued Stay Note  ARNULFO Cota     Patient Name: Geovanny Em  MRN: [id number]  Today's Date: 9/2/2021    Admit Date: 8/21/2021    Discharge Plan     Row Name 09/02/21 1116       Plan    Plan  D/C Plan : Pending clincal course , pt remains on the vent and is going to get dialysis today .FANNY is following        Discharge Codes    No documentation.       Expected Discharge Date and Time     Expected Discharge Date Expected Discharge Time    Aug 30, 2021             Paulina Juan RN

## 2021-09-02 NOTE — PROGRESS NOTES
"PULMONARY CRITICAL CARE Progress  NOTE      PATIENT IDENTIFICATION:  Name: Geovanny Em  MRN: [id number]  :  1975     Age: 46 y.o.  Sex: male    DATE OF Note:  2021   Referring Physician: Rubin Laird MD                  Subjective:   On vent still episode of hypoxia  On TPN  no nausea or vomiting, no change in bowel habit, no dysuria,  no new  skin rash or itching.      Objective:  tMax 24 hrs: Temp (24hrs), Av.2 °F (37.3 °C), Min:98.5 °F (36.9 °C), Max:100.8 °F (38.2 °C)      Vitals Ranges:   Temp:  [98.5 °F (36.9 °C)-100.8 °F (38.2 °C)] 99.4 °F (37.4 °C)  Heart Rate:  [65-73] 66  Resp:  [28-30] 30  BP: (131-172)/(59-78) 131/64  Arterial Line BP: (152-188)/() 159/67  FiO2 (%):  [85 %-100 %] 85 %    Intake and Output Last 3 Shifts:   I/O last 3 completed shifts:  In: 4427 [I.V.:4377; NG/GT:50]  Out: 6700 [Urine:6700]    Exam:  /64 (BP Location: Right arm, Patient Position: Lying)   Pulse 66   Temp 99.4 °F (37.4 °C) (Axillary)   Resp (!) 30   Ht 177.8 cm (70\")   Wt 106 kg (232 lb 12.9 oz)   SpO2 94%   BMI 33.40 kg/m²     General Appearance:   On vent sedated response to stimuli  HEENT:  Normocephalic, without obvious abnormality, Conjunctiva/corneas clear,.  Normal external ear canals, Nares normal, no drainage     Neck:  Supple, symmetrical, trachea midline. No JVD.  Lungs /Chest wall:   Bilateral basal rhonchi, respirations unlabored symmetrical wall movement.     Heart:  Regular rate and rhythm, systolic murmur PMI left sternal border  Abdomen: Soft, non-tender, no masses, no organomegaly.    Extremities: Trace edema no clubbing or Cyanosis        Medications:    Current Facility-Administered Medications:   •  acetaminophen (TYLENOL) tablet 650 mg, 650 mg, Oral, Q6H PRN, Selma Holley, APRN, 650 mg at 21 0908  •  Adult Central Clinimix TPN, , Intravenous, Q24H (TPN), Kayla Carter MD, Last Rate: 63 mL/hr at 21, New Bag at 21  •  " amLODIPine (NORVASC) tablet 10 mg, 10 mg, Oral, Q24H, Rossy Polk MD, 10 mg at 09/02/21 0843  •  arformoterol (BROVANA) nebulizer solution 15 mcg, 15 mcg, Nebulization, BID - RT, Kayla Carter MD, 15 mcg at 09/02/21 0736  •  bisacodyl (DULCOLAX) suppository 10 mg, 10 mg, Rectal, Daily PRN, Kassandra Sandra APRN, 10 mg at 08/26/21 1402  •  budesonide (PULMICORT) nebulizer solution 0.5 mg, 0.5 mg, Nebulization, BID - RT, Ra Geiger APRN, 0.5 mg at 09/02/21 0736  •  carboxymethylcellulose sod 1 % eye gel 1 drop, 1 drop, Both Eyes, TID PRN, Ra Geiger APRN, 1 drop at 08/31/21 2159  •  clevidipine (CLEVIPREX) infusion 0.5 mg/mL, 2-32 mg/hr, Intravenous, Continuous PRN, Kassandra Sandra APRN, Last Rate: 64 mL/hr at 09/02/21 1055, 32 mg/hr at 09/02/21 1055  •  cloNIDine (CATAPRES-TTS) 0.3 MG/24HR patch 1 patch, 1 patch, Transdermal, Weekly, Rossy Polk MD, 1 patch at 08/27/21 1024  •  dextrose (D50W) 25 g/ 50mL Intravenous Solution 25 g, 25 g, Intravenous, Q15 Min PRN, Concepcion Farmer APRN  •  dextrose (GLUTOSE) oral gel 15 g, 15 g, Oral, Q15 Min PRN, Concepcion Farmer APRN  •  epoprostenol (VELETRI) 1.5mg in 50mL NS (37326 ng/mL) nebulization, 50 ng/kg/min (Ideal), Inhalation, Continuous, Draw, MD Rubin, Last Rate: 7.3 mL/hr at 09/01/21 0923, 50 ng/kg/min at 09/01/21 0923  •  famotidine (PEPCID) tablet 20 mg, 20 mg, Nasogastric, Daily, Rubin Laird MD, 20 mg at 09/02/21 0843  •  fentaNYL 2500 mcg/250 mL NS infusion,  mcg/hr, Intravenous, Titrated, Concepcion Farmer APRN, Last Rate: 20 mL/hr at 09/02/21 0702, 200 mcg/hr at 09/02/21 0702  •  folic acid (FOLVITE) tablet 1 mg, 1 mg, Oral, Daily, Rubin Laird MD, 1 mg at 09/02/21 0843  •  glucagon (human recombinant) (GLUCAGEN DIAGNOSTIC) 1 mg in sterile water (preservative free) 1 mL injection, 1 mg, Subcutaneous, Q15 Min PRN, Concepcion Farmer APRN  •  hydrALAZINE (APRESOLINE) injection 20 mg, 20 mg, Intravenous, Q4H PRN,  Rossy Polk MD, 20 mg at 09/02/21 0534  •  hydrALAZINE (APRESOLINE) tablet 100 mg, 100 mg, Nasogastric, Q8H, Rossy Polk MD, 100 mg at 09/02/21 0519  •  insulin lispro (ADMELOG) injection 0-24 Units, 0-24 Units, Subcutaneous, Q6H, 20 Units at 09/02/21 0052 **AND** insulin lispro (ADMELOG) injection 0-24 Units, 0-24 Units, Subcutaneous, PRN, Kayla Carter MD, 24 Units at 09/02/21 0908  •  insulin regular (humuLIN R,novoLIN R) injection 12 Units, 12 Units, Subcutaneous, Q6H, Kayla Carter MD, 12 Units at 09/02/21 0842  •  ipratropium-albuterol (DUO-NEB) nebulizer solution 3 mL, 3 mL, Nebulization, Q4H - RT, Kayla Carter MD, 3 mL at 09/02/21 1056  •  labetalol (NORMODYNE,TRANDATE) 250 mg in sodium chloride 0.9 % 250 mL infusion, 0.5-2 mg/min, Intravenous, Titrated, Rossy Polk MD, Last Rate: 30 mL/hr at 08/31/21 0826, 0.5 mg/min at 08/31/21 0826  •  LORazepam (ATIVAN) injection 2 mg, 2 mg, Intravenous, Q2H PRN, Nicolasa Howe APRN, 2 mg at 08/29/21 1744  •  methylPREDNISolone sodium succinate (SOLU-Medrol) injection 125 mg, 125 mg, Intravenous, Q6H, Selma Holley APRN, 125 mg at 09/02/21 0702  •  metoclopramide (REGLAN) injection 5 mg, 5 mg, Intravenous, Q8H, Rubin Laird MD, 5 mg at 09/02/21 0519  •  midazolam (VERSED) 100 mg in 100mL NS infusion, 1-10 mg/hr, Intravenous, Titrated, Selma Holley APRN, Last Rate: 6 mL/hr at 09/02/21 0051, 6 mg/hr at 09/02/21 0051  •  multivitamin (THERAGRAN) tablet 1 tablet, 1 tablet, Oral, Daily, Clemente Garcia MD, 1 tablet at 09/02/21 0843  •  ondansetron (ZOFRAN) tablet 4 mg, 4 mg, Oral, Q6H PRN **OR** ondansetron (ZOFRAN) injection 4 mg, 4 mg, Intravenous, Q6H PRN, Day, Nicolasa, APRN  •  Pharmacy to Dose TPN, , Does not apply, Continuous PRN, Day, Dawn, APRN  •  Pharmacy to Dose Zosyn, , Does not apply, Continuous PRN, Day, Dawn, APRN  •  piperacillin-tazobactam (ZOSYN) IVPB 3.375 g in 100 mL NS (CD), 3.375 g,  Intravenous, Q8H, Day, Dawn, APRN, Last Rate: 25 mL/hr at 09/02/21 0300, 3.375 g at 09/02/21 0300  •  polyethylene glycol (MIRALAX) packet 17 g, 17 g, Nasogastric, Daily, Day, Nicolasa, APRN, 17 g at 09/02/21 0843  •  sildenafil (REVATIO) tablet 20 mg, 20 mg, Oral, Q8H, Rubin Laird MD, 20 mg at 09/02/21 0519  •  sodium chloride 0.9 % flush 3 mL, 3 mL, Intravenous, Q12H, Day, Dawn, APRN, 3 mL at 09/01/21 2034  •  sodium chloride 0.9 % flush 3-10 mL, 3-10 mL, Intravenous, PRN, Day, Dawn, APRN  •  thiamine (VITAMIN B-1) tablet 100 mg, 100 mg, Nasogastric, Daily, Rubin Laird MD, 100 mg at 09/02/21 0843  •  vecuronium (NORCURON) injection 10 mg, 10 mg, Intravenous, Q1H PRN, Day, Dawn, APRN, 10 mg at 08/29/21 1744    Data Review:  All labs (24hrs):   Recent Results (from the past 24 hour(s))   POC Glucose Once    Collection Time: 09/01/21 12:49 PM    Specimen: Blood   Result Value Ref Range    Glucose 375 (H) 70 - 105 mg/dL   POC Glucose Once    Collection Time: 09/01/21  5:52 PM    Specimen: Blood   Result Value Ref Range    Glucose 321 (H) 70 - 105 mg/dL   POC Glucose Once    Collection Time: 09/02/21 12:33 AM    Specimen: Blood   Result Value Ref Range    Glucose 395 (H) 70 - 105 mg/dL   Magnesium    Collection Time: 09/02/21  3:01 AM    Specimen: Blood   Result Value Ref Range    Magnesium 2.9 (H) 1.6 - 2.6 mg/dL   Comprehensive Metabolic Panel    Collection Time: 09/02/21  3:01 AM    Specimen: Blood   Result Value Ref Range    Glucose 450 (C) 65 - 99 mg/dL     (H) 6 - 20 mg/dL    Creatinine 1.89 (H) 0.76 - 1.27 mg/dL    Sodium 151 (H) 136 - 145 mmol/L    Potassium 4.4 3.5 - 5.2 mmol/L    Chloride 118 (H) 98 - 107 mmol/L    CO2 21.0 (L) 22.0 - 29.0 mmol/L    Calcium 9.4 8.6 - 10.5 mg/dL    Total Protein 6.3 6.0 - 8.5 g/dL    Albumin 3.20 (L) 3.50 - 5.20 g/dL    ALT (SGPT) 35 1 - 41 U/L    AST (SGOT) 18 1 - 40 U/L    Alkaline Phosphatase 93 39 - 117 U/L    Total Bilirubin 1.7 (H) 0.0 - 1.2 mg/dL    eGFR Non   Amer 38 (L) >60 mL/min/1.73    Globulin 3.1 gm/dL    A/G Ratio 1.0 g/dL    BUN/Creatinine Ratio 53.4 (H) 7.0 - 25.0    Anion Gap 12.0 5.0 - 15.0 mmol/L   Phosphorus    Collection Time: 09/02/21  3:01 AM    Specimen: Blood   Result Value Ref Range    Phosphorus 4.0 2.5 - 4.5 mg/dL   Calcium, Ionized    Collection Time: 09/02/21  3:01 AM    Specimen: Blood   Result Value Ref Range    Ionized Calcium 1.38 (H) 1.20 - 1.30 mmol/L   CBC Auto Differential    Collection Time: 09/02/21  3:01 AM    Specimen: Blood   Result Value Ref Range    WBC 22.20 (H) 3.40 - 10.80 10*3/mm3    RBC 3.36 (L) 4.14 - 5.80 10*6/mm3    Hemoglobin 10.5 (L) 13.0 - 17.7 g/dL    Hematocrit 32.8 (L) 37.5 - 51.0 %    MCV 97.4 (H) 79.0 - 97.0 fL    MCH 31.3 26.6 - 33.0 pg    MCHC 32.1 31.5 - 35.7 g/dL    RDW 15.2 12.3 - 15.4 %    RDW-SD 52.5 37.0 - 54.0 fl    MPV 10.5 6.0 - 12.0 fL    Platelets 176 140 - 450 10*3/mm3   Scan Slide    Collection Time: 09/02/21  3:01 AM    Specimen: Blood   Result Value Ref Range    Scan Slide     Manual Differential    Collection Time: 09/02/21  3:01 AM    Specimen: Blood   Result Value Ref Range    Neutrophil % 84.0 (H) 42.7 - 76.0 %    Lymphocyte % 5.0 (L) 19.6 - 45.3 %    Monocyte % 1.0 (L) 5.0 - 12.0 %    Bands %  9.0 (H) 0.0 - 5.0 %    Metamyelocyte % 1.0 (H) 0.0 - 0.0 %    Neutrophils Absolute 20.65 (H) 1.70 - 7.00 10*3/mm3    Lymphocytes Absolute 1.11 0.70 - 3.10 10*3/mm3    Monocytes Absolute 0.22 0.10 - 0.90 10*3/mm3    Macrocytes Slight/1+ None Seen    Poikilocytes Slight/1+ None Seen    WBC Morphology Normal Normal    Platelet Estimate Adequate Normal   Blood Gas, Arterial -    Collection Time: 09/02/21  3:59 AM    Specimen: Arterial Blood   Result Value Ref Range    Site Arterial Line     Asher's Test N/A     pH, Arterial 7.341 (L) 7.350 - 7.450 pH units    pCO2, Arterial 41.5 35.0 - 48.0 mm Hg    pO2, Arterial 84.2 83.0 - 108.0 mm Hg    HCO3, Arterial 22.4 21.0 - 28.0 mmol/L    Base Excess, Arterial -3.2 (L)  0.0 - 3.0 mmol/L    O2 Saturation, Arterial 95.6 94.0 - 98.0 %    CO2 Content 23.7 22 - 29 mmol/L    Barometric Pressure for Blood Gas      Modality Adult Vent     FIO2 90 %    Ventilator Mode ;AC     Set Tidal Volume 520     PEEP 17     Hemodilution No     Respiratory Rate 28    POC Glucose Once    Collection Time: 09/02/21  4:40 AM    Specimen: Blood   Result Value Ref Range    Glucose 415 (C) 70 - 105 mg/dL   POC Glucose Once    Collection Time: 09/02/21  7:05 AM    Specimen: Blood   Result Value Ref Range    Glucose 422 (C) 70 - 105 mg/dL   Hepatitis B Surface Antigen    Collection Time: 09/02/21  8:48 AM    Specimen: Blood   Result Value Ref Range    Hepatitis B Surface Ag Non-Reactive Non-Reactive   POC Glucose Once    Collection Time: 09/02/21  8:59 AM    Specimen: Blood   Result Value Ref Range    Glucose 402 (C) 70 - 105 mg/dL        Imaging:  XR Chest 1 View  Narrative: Examination: XR CHEST 1 VW-     Date of Exam: 9/2/2021 10:23 AM     Indication: worsening hypoxia, f/u ptx; I71.00-Dissection of unspecified  site of aorta; J96.01-Acute respiratory failure with hypoxia;  J18.9-Pneumonia, unspecified organism.     Comparison: September 1, 2021     Technique: 1 view of the chest      Findings:  There is a right internal jugular central line with the tip in the  superior vena cava. There is an endotracheal tube at the level of  clavicles. There is a nasogastric tube below the diaphragm. There are  diffuse bilateral alveolar airspace opacities greater on the left than  the right and appearing similar to the prior study. There is no definite  pneumothorax.     Impression: Diffuse bilateral infiltrates persist greater on the left than the  right. Aeration is similar to the prior study. Tubes and lines appear  properly positioned.     Electronically Signed By-Roel Morin MD On:9/2/2021 10:43 AM  This report was finalized on [id number] by  Roel Morin MD.       ASSESSMENT:   acute hypoxic respiratory failure   Aortic aneurysm and dissection (CMS/HCC)    Tobacco abuse    Hypertension    Medically noncompliant    Dissection of aorta (CMS/HCC)    Alcohol abuse    Acute respiratory failure with hypoxia (CMS/HCC)    Pertussis pneumonia    VANESSA (acute kidney injury)    COVID-19 vaccine series declined    Acute encephalopathy    Pneumonia due to infectious organism    ARDS (adult respiratory distress syndrome)    Hypertensive urgency     PLAN:  Vent management now pressure control  HD today per renal  BP control  Still not able to get CT abdomen not stable   Antibiotics per ID  Pt not surgical candidate  Bronchodilator  Inhaled corticosteroids  Electrolytes/ glycemic control  DVT and GI prophylaxis.  Prognosis guarded due to multiple T organ failure  Total Critical care time in direct medical management ( 34  ) minutes  Kayla Carter MD. D, ABSM.     9/2/2021  11:37 EDT

## 2021-09-02 NOTE — PROGRESS NOTES
Pharmacy consult - total parental nutrition    Geovanny Em is a 46 y.o.male admitted with type B aortic dissection. Pharmacy consulted to dose TPN for Nonfunctional or inaccessible GI tract per Dr. Carter's request. Patient was previously on tube feeds 8/23 - 8/27 but turned off d/t issues with abdominal distention and high abdominal pressure.      Assessment  Estimated macronutrient goal requirements per RD:    TPN type: Clinimix 5/20 (2400 mL/day)  Line type: Central  Protein: 120 grams/day (~1.2 grams/kg/day)  Lipids: 250 mL of 20% lipid infusion 2 days/week  Kcal/day: 2327 kcal (~25 kcal/kg/day)    Plan    Patient at risk for RFS d/t suboptimal intake and h/o EtOH abuse. Will not titrate up today d/t patient also being on a significant amount of clevidipine, which is a lipid based emulsion. Will continue TPN with the following macros per d/w RD Remedios:    Protein: 75 grams/day  Dextrose: 300 grams/day  Lipids: holding while on clevidipine  Volume: 1500 mL (63 mL/hr over 24 hrs daily)    Ionized calcium, sodium, phosphorus, and magnesium are still elevated so will continue to hold all electrolytes from TPN. K and phos starting to trend down so may be able to add slight kphos to TPN tomorrow if trend continues. Will also hold trace elements as bilirubin is elevated. Will continue to monitor BGs carefully as patient had to be started on an insulin gtt today. MVI to be added MWF d/t stability. Based on labs, will add the following electrolytes/additives to the TPN:     Date: 8/30 8/31 9/1 9/2     TPN Lytes (60mL)         CaGluc (mEq) 8 8       MgSO4 (mEq)         NaCl (mEq)         NaAcetate (mEq)         KCL (mEq)         KAcetate (mEq)         NaPhos (mmol)         Kphos (mmol)         Famotidine (mg)         MVI (10 mL) 10 10 10      Trace (1 mL)         Humulin R (units)             Patient also received the following electrolyte replacement per protocol:    N/A.    Pharmacy will continue to follow.     "      Objective  Relevant clinical data and objective history reviewed:  177.8 cm (70\")   106 kg (232 lb 12.9 oz)   Ideal body weight: 73 kg (160 lb 15 oz)  Adjusted ideal body weight: 86 kg (189 lb 10.9 oz)  Body mass index is 33.4 kg/m².    Results from last 7 days   Lab Units 09/02/21  0301 09/01/21  0523 08/31/21  0537 08/30/21  1415 08/30/21  1157 08/30/21  0325 08/29/21  1246 08/29/21  0518 08/28/21  0244 08/27/21  0448   SODIUM mmol/L 151* 150* 147*  --  149* 148*  --  150* 151* 148*   POTASSIUM mmol/L 4.4 4.7 4.5  --  4.4 4.6  --  4.1 4.1 4.5   CHLORIDE mmol/L 118* 118* 115*  --  116* 116*  --  118* 118* 113*   CO2 mmol/L 21.0* 21.0* 21.0*  --  22.0 21.0*  --  20.0* 20.0* 23.0   GLUCOSE mg/dL 450* 340* 177*  --  149* 150*  --  147* 158* 225*   BUN mg/dL 101* 89* 81*  --  83* 85*  --  85* 65* 51*   CREATININE mg/dL 1.89* 1.68* 1.84*  --  1.96* 2.06*  --  2.23* 1.90* 1.54*   CALCIUM mg/dL 9.4 8.8 8.7  --  8.4* 8.5*  --  8.2* 8.4* 8.7   IONIZED CALCIUM mmol/L 1.38* 1.33* 1.26  --  1.23  --   --   --  1.22  --    PHOSPHORUS mg/dL 4.0 5.0* 5.5* 5.1* 5.4*  --   --  4.8* 4.2 3.9   MAGNESIUM mg/dL 2.9* 3.1* 3.0*  --  3.0* 3.0*  --  2.6 2.4 2.6   TOTAL PROTEIN g/dL 6.3 5.8* 6.1  --  5.7* 6.0 5.6*  --  5.9*  --    ALBUMIN g/dL 3.20* 2.50* 2.50*  --  2.50* 2.50* 2.50* 2.50* 2.80* 3.20*   PREALBUMIN mg/dL  --   --   --   --  7.9*  --   --   --   --   --    BILIRUBIN mg/dL 1.7* 1.9* 2.6*  --  2.1* 1.9* 1.3*  --  0.9  --    ALK PHOS U/L 93 99 108  --  104 106 111  --  143*  --    AST (SGOT) U/L 18 18 20  --  19 14 13  --  26  --    ALT (SGPT) U/L 35 31 29  --  27 27 26  --  43*  --    TRIGLYCERIDES mg/dL  --   --   --   --  157*  --   --   --  83  --      I/O last 3 completed shifts:  In: 4427 [I.V.:4377; NG/GT:50]  Out: 6700 [Urine:6700]  Estimated Creatinine Clearance: 58.3 mL/min (A) (by C-G formula based on SCr of 1.89 mg/dL (H)).    Dietary Orders (From admission, onward)       Start     Ordered    08/30/21 1138  NPO " Diet  Diet Effective Now      08/30/21 1141                    Valentina Martin RPH  13:16 EDT 9/2/2021

## 2021-09-02 NOTE — PROGRESS NOTES
F/U:  Type B aortic dissection--[facility]agni  EF 55-60% (echo)    Subjective:  Intubated/sedated    Hypoxia continues, remains on 100%/+17 PEEP, off Flolan since yest  Shiley placed for HD today  TPN   PCR COVID-19 negative  COVID-19 neg x2 (last test was 8/29)  Drips:  Versed, Fentanyl, TPN, Cleviprex 32  Wt up 12 kgs since admission        Intake/Output Summary (Last 24 hours) at 9/2/2021 0955  Last data filed at 9/2/2021 0549  Gross per 24 hour   Intake 2955 ml   Output 3700 ml   Net -745 ml     Temp:  [98.5 °F (36.9 °C)-100.8 °F (38.2 °C)] 100.8 °F (38.2 °C)  Heart Rate:  [65-73] 73  Resp:  [28] 28  BP: (136-172)/(59-78) 145/67  Arterial Line BP: (152-188)/() 159/67  FiO2 (%):  [85 %-100 %] 85 %      Results from last 7 days   Lab Units 09/02/21  0301 09/01/21  0523   WBC 10*3/mm3 22.20* 22.50*   HEMOGLOBIN g/dL 10.5* 11.6*   HEMATOCRIT % 32.8* 34.9*   PLATELETS 10*3/mm3 176 170     Results from last 7 days   Lab Units 09/02/21  0301   CREATININE mg/dL 1.89*   POTASSIUM mmol/L 4.4   SODIUM mmol/L 151*   MAGNESIUM mg/dL 2.9*   PHOSPHORUS mg/dL 4.0       Physical Exam:  Intubated/sedated  Tele:  SB 60s  Vent--100% +17 PEEP      Assessment/Plan:  Principal Problem:    Aortic aneurysm and dissection (CMS/HCC)  Active Problems:    Tobacco abuse    Hypertension    Medically noncompliant    Dissection of aorta (CMS/HCC)    Alcohol abuse    Acute respiratory failure with hypoxia (CMS/HCC)    Pertussis pneumonia    VANESSA (acute kidney injury)    COVID-19 vaccine series declined    Acute encephalopathy    Pneumonia due to infectious organism    ARDS (adult respiratory distress syndrome)    Hypertensive urgency    Acute type B dissection without mal perfusion--stable  Acute respiratory failure with hypoxia, Pertussis pna, Staph aureus pna--pulm following  VANESSA/ARF with hypernatremia--cr 1.68 (1.5),na 150, renal following  Untreated HTN--on Cleviprex  Abdominal distention with elevated bladder pressure--gen surgery  following  Volume excess r/t antihypertensive drips  Tobacco abuse--cessation d/w pt  ETOH abuse/withdrawal--15 beers/daily  Non-compliance with medications--off BP meds x 5 years    He is stable from type B dissection but has multiple other medical issues at this time.  Plans to start HD today.  Still to unstable to travel to radiology for scans.  Will continue to follow along.    Sandi Marquez, APRN  9/2/2021  09:55 EDT

## 2021-09-02 NOTE — PROGRESS NOTES
NEPHROLOGY PROGRESS NOTE------[facility]    [facility]  [phone]  Carin Polk MD      Patient Care Team:  Provider, No Known as PCP - Ranulfo Marinelli MD as Consulting Physician (Nephrology)      Provider:  Carin Polk MD  Patient Name: Geovanny Em  :  1975    SUBJECTIVE:    F/U ARF/VANESSA/HTN    SEDATED AND INTUBATED ON VENT.     Medication:  amLODIPine, 10 mg, Oral, Q24H  arformoterol, 15 mcg, Nebulization, BID - RT  budesonide, 0.5 mg, Nebulization, BID - RT  cloNIDine, 1 patch, Transdermal, Weekly  famotidine, 20 mg, Nasogastric, Daily  folic acid, 1 mg, Oral, Daily  hydrALAZINE, 100 mg, Nasogastric, Q8H  insulin lispro, 0-24 Units, Subcutaneous, Q6H  ipratropium-albuterol, 3 mL, Nebulization, Q4H - RT  methylPREDNISolone sodium succinate, 125 mg, Intravenous, Q6H  metoclopramide, 5 mg, Intravenous, Q8H  multivitamin, 1 tablet, Oral, Daily  piperacillin-tazobactam, 3.375 g, Intravenous, Q8H  polyethylene glycol, 17 g, Nasogastric, Daily  sildenafil, 20 mg, Oral, Q8H  sodium chloride, 3 mL, Intravenous, Q12H  thiamine, 100 mg, Nasogastric, Daily      Adult Central Clinimix TPN, , Last Rate: 63 mL/hr at 21 1834  clevidipine, 2-32 mg/hr, Last Rate: 20 mg/hr (21 0533)  epoprostenol, 50 ng/kg/min (Ideal), Last Rate: 50 ng/kg/min (21)  fentanyl 10 mcg/mL,  mcg/hr, Last Rate: 200 mcg/hr (21 0702)  labetalol, 0.5-2 mg/min, Last Rate: 0.5 mg/min (21 0826)  midazolam 1 mg/mL 100mL NS, 1-10 mg/hr, Last Rate: 6 mg/hr (21 0051)  Pharmacy to Dose TPN,   Pharmacy to Dose Zosyn,         OBJECTIVE    Vital Sign Min/Max for last 24 hours  Temp  Min: 97.6 °F (36.4 °C)  Max: 98.8 °F (37.1 °C)   BP  Min: 135/69  Max: 172/69   Pulse  Min: 65  Max: 72   Resp  Min: 20  Max: 28   SpO2  Min: 91 %  Max: 96 %   No data recorded   Weight  Min: 106 kg (232 lb 12.9 oz)  Max: 106 kg (232 lb 12.9 oz)  "    Flowsheet Rows      First Filed Value   Admission Height  177.8 cm (70\") Documented at 08/21/2021 0136   Admission Weight  94.3 kg (207 lb 14.3 oz) Documented at 08/21/2021 0136          No intake/output data recorded.  I/O last 3 completed shifts:  In: 4427 [I.V.:4377; NG/GT:50]  Out: 6700 [Urine:6700]    Physical Exam:  ON CLEVIPREX  General Appearance: SEDATED AND INTUBATED ON VENT  Head: normocephalic, without obvious abnormality and atraumatic +ETT INTACT/NGT  Eyes: conjunctivae and sclerae normal and no icterus  Neck: supple and no JVD  Lungs: diminished breath sounds   Heart: regular rhythm & normal rate and normal S1, S2 +SASCHA  Chest: Wall no abnormalities observed  Abdomen: ABDOMEN DISTENDED +HYPOACTIVE BS +CRUZ  Extremities: No edema, no cyanosis and no redness  Skin: no bleeding, bruising or rash, turgor normal, color normal and no lesions noted  Neurologic: INTUBATED ON VENT     Labs:    WBC WBC   Date Value Ref Range Status   09/02/2021 22.20 (H) 3.40 - 10.80 10*3/mm3 Final   09/01/2021 22.50 (H) 3.40 - 10.80 10*3/mm3 Final   08/31/2021 21.10 (H) 3.40 - 10.80 10*3/mm3 Final      HGB Hemoglobin   Date Value Ref Range Status   09/02/2021 10.5 (L) 13.0 - 17.7 g/dL Final   09/01/2021 11.6 (L) 13.0 - 17.7 g/dL Final   08/31/2021 11.7 (L) 13.0 - 17.7 g/dL Final      HCT Hematocrit   Date Value Ref Range Status   09/02/2021 32.8 (L) 37.5 - 51.0 % Final   09/01/2021 34.9 (L) 37.5 - 51.0 % Final   08/31/2021 36.3 (L) 37.5 - 51.0 % Final      Platlets No results found for: LABPLAT   MCV MCV   Date Value Ref Range Status   09/02/2021 97.4 (H) 79.0 - 97.0 fL Final   09/01/2021 98.9 (H) 79.0 - 97.0 fL Final   08/31/2021 99.1 (H) 79.0 - 97.0 fL Final          Sodium Sodium   Date Value Ref Range Status   09/02/2021 151 (H) 136 - 145 mmol/L Final   09/01/2021 150 (H) 136 - 145 mmol/L Final   08/31/2021 147 (H) 136 - 145 mmol/L Final   08/30/2021 149 (H) 136 - 145 mmol/L Final      Potassium Potassium   Date Value " Ref Range Status   09/02/2021 4.4 3.5 - 5.2 mmol/L Final   09/01/2021 4.7 3.5 - 5.2 mmol/L Final   08/31/2021 4.5 3.5 - 5.2 mmol/L Final   08/30/2021 4.4 3.5 - 5.2 mmol/L Final     Comment:     Slight hemolysis detected by analyzer. Results may be affected.      Chloride Chloride   Date Value Ref Range Status   09/02/2021 118 (H) 98 - 107 mmol/L Final   09/01/2021 118 (H) 98 - 107 mmol/L Final   08/31/2021 115 (H) 98 - 107 mmol/L Final   08/30/2021 116 (H) 98 - 107 mmol/L Final      CO2 CO2   Date Value Ref Range Status   09/02/2021 21.0 (L) 22.0 - 29.0 mmol/L Final   09/01/2021 21.0 (L) 22.0 - 29.0 mmol/L Final   08/31/2021 21.0 (L) 22.0 - 29.0 mmol/L Final   08/30/2021 22.0 22.0 - 29.0 mmol/L Final      BUN BUN   Date Value Ref Range Status   09/02/2021 101 (H) 6 - 20 mg/dL Final   09/01/2021 89 (H) 6 - 20 mg/dL Final   08/31/2021 81 (H) 6 - 20 mg/dL Final   08/30/2021 83 (H) 6 - 20 mg/dL Final      Creatinine Creatinine   Date Value Ref Range Status   09/02/2021 1.89 (H) 0.76 - 1.27 mg/dL Final   09/01/2021 1.68 (H) 0.76 - 1.27 mg/dL Final   08/31/2021 1.84 (H) 0.76 - 1.27 mg/dL Final   08/30/2021 1.96 (H) 0.76 - 1.27 mg/dL Final      Calcium Calcium   Date Value Ref Range Status   09/02/2021 9.4 8.6 - 10.5 mg/dL Final   09/01/2021 8.8 8.6 - 10.5 mg/dL Final   08/31/2021 8.7 8.6 - 10.5 mg/dL Final   08/30/2021 8.4 (L) 8.6 - 10.5 mg/dL Final      PO4 No components found for: PO4   Albumin Albumin   Date Value Ref Range Status   09/02/2021 3.20 (L) 3.50 - 5.20 g/dL Final   09/01/2021 2.50 (L) 3.50 - 5.20 g/dL Final   08/31/2021 2.50 (L) 3.50 - 5.20 g/dL Final   08/30/2021 2.50 (L) 3.50 - 5.20 g/dL Final      Magnesium Magnesium   Date Value Ref Range Status   09/02/2021 2.9 (H) 1.6 - 2.6 mg/dL Final   09/01/2021 3.1 (H) 1.6 - 2.6 mg/dL Final   08/31/2021 3.0 (H) 1.6 - 2.6 mg/dL Final   08/30/2021 3.0 (H) 1.6 - 2.6 mg/dL Final      Uric Acid No components found for: URIC ACID     Imaging Results (Last 72 Hours)      Procedure Component Value Units Date/Time    XR Chest 1 View [[id number]] Collected: 09/01/21 0751     Updated: 09/01/21 0754    Narrative:      XR CHEST 1 VW-     Date of Exam: 9/1/2021 5:31 AM     Indication: Shortness of breath; I71.00-Dissection of unspecified site  of aorta; J96.01-Acute respiratory failure with hypoxia;  J18.9-Pneumonia, unspecified organism.     Comparison Exams: August 31, 2021     Technique: Single AP chest radiograph     FINDINGS:  An endotracheal tube has its tip in the midtrachea. An NG tube has its  tip below the diaphragm. A right internal jugular catheter has its tip  in the right atrium. Patchy bilateral airspace consolidations appear  slightly improved on the right. The heart and mediastinal contours  appear stable. The osseous structures appear intact.       Impression:      1.Endotracheal tube in good position.  2.Patchy bilateral airspace consolidations appear slightly improved on  the right, which could reflect pneumonia, pulmonary edema, or ARDS.     Electronically Signed By-Shahriar Sutton MD On:9/1/2021 7:52 AM  This report was finalized on [id number] by  Shahriar Sutton MD.    XR Chest 1 View [[id number]] Collected: 08/31/21 0742     Updated: 08/31/21 0746    Narrative:      XR CHEST 1 VW-     Date of Exam: 8/31/2021 6:06 AM     Indication: decreased O2 sat; per Selma Holley, APRN;  I71.00-Dissection of unspecified site of aorta; J96.01-Acute respiratory  failure with hypoxia; J18.9-Pneumonia, unspecified organism.     Comparison Exams: August 29, 2021     Technique: Single AP chest radiograph     FINDINGS:  Endotracheal tube has its tip in the midtrachea. An NG tube has its tip  below the diaphragm. A right internal jugular catheter has its tip at  the cavoatrial junction. Diffuse bilateral airspace consolidations are  slightly improved on the right. The heart and mucosal contours appear  stable. The osseous structures appear intact.       Impression:       1.Endotracheal tube in good position.  2.Diffuse bilateral airspace consolidations appear slightly improved on  the right, which could represent pneumonia, pulmonary edema, or ARDS.     Electronically Signed By-Shahriar Sutton MD On:8/31/2021 7:44 AM  This report was finalized on [id number] by  Shahriar Sutton MD.          Results for orders placed during the hospital encounter of 08/21/21    XR Chest 1 View    Narrative  XR CHEST 1 VW-    Date of Exam: 8/31/2021 6:06 AM    Indication: decreased O2 sat; per Selma Holley, APRN;  I71.00-Dissection of unspecified site of aorta; J96.01-Acute respiratory  failure with hypoxia; J18.9-Pneumonia, unspecified organism.    Comparison Exams: August 29, 2021    Technique: Single AP chest radiograph    FINDINGS:  Endotracheal tube has its tip in the midtrachea. An NG tube has its tip  below the diaphragm. A right internal jugular catheter has its tip at  the cavoatrial junction. Diffuse bilateral airspace consolidations are  slightly improved on the right. The heart and mucosal contours appear  stable. The osseous structures appear intact.    Impression  1.Endotracheal tube in good position.  2.Diffuse bilateral airspace consolidations appear slightly improved on  the right, which could represent pneumonia, pulmonary edema, or ARDS.    Electronically Signed By-Shahriar Sutton MD On:8/31/2021 7:44 AM  This report was finalized on [id number] by  Shahriar Sutton MD.      XR Chest 1 View    Narrative  XR CHEST 1 VW-    Date of Exam: 9/1/2021 5:31 AM    Indication: Shortness of breath; I71.00-Dissection of unspecified site  of aorta; J96.01-Acute respiratory failure with hypoxia;  J18.9-Pneumonia, unspecified organism.    Comparison Exams: August 31, 2021    Technique: Single AP chest radiograph    FINDINGS:  An endotracheal tube has its tip in the midtrachea. An NG tube has its  tip below the diaphragm. A right internal jugular catheter has its tip  in the right  atrium. Patchy bilateral airspace consolidations appear  slightly improved on the right. The heart and mediastinal contours  appear stable. The osseous structures appear intact.    Impression  1.Endotracheal tube in good position.  2.Patchy bilateral airspace consolidations appear slightly improved on  the right, which could reflect pneumonia, pulmonary edema, or ARDS.    Electronically Signed By-Shahriar Sutton MD On:9/1/2021 7:52 AM  This report was finalized on [id number] by  Shahriar Sutton MD.      XR Abdomen KUB    Narrative  DATE OF EXAM:  8/28/2021 2:19 PM    PROCEDURE:  XR ABDOMEN KUB-    INDICATIONS:  Abdominal distention, aortic dissection.    COMPARISON:  08/22/2021.    TECHNIQUE:  Single radiographic view of the abdomen was obtained.      FINDINGS:  The tip of the nasogastric tube terminates within the body of the  stomach. The bowel gas pattern is unremarkable. There is old barium  contrast seen within the distal colon. There is a Galvan catheter in the  urinary bladder. The bony structures are within range of normal.    Impression  1. The tip of the nasogastric tube terminates within the body of the  stomach.  2. Unremarkable bowel gas pattern.    Electronically Signed By-Chirag Nguyen MD On:8/28/2021 3:11 PM  This report was finalized on [id number] by  Chirag Nguyen MD.            ASSESSMENT / PLAN      Aortic aneurysm and dissection (CMS/HCC)    Tobacco abuse    Hypertension    Medically noncompliant    Dissection of aorta (CMS/HCC)    Alcohol abuse    Acute respiratory failure with hypoxia (CMS/HCC)    Pertussis pneumonia    VANESSA (acute kidney injury)    COVID-19 vaccine series declined    Acute encephalopathy    Pneumonia due to infectious organism    ARDS (adult respiratory distress syndrome)    Hypertensive urgency    1. ARF/VANESSA-----Nonoliguric. BUN/Cr up. Spoke to patient's son on the phone and patient's significant other in the room. Will do HD today and follow.   +ATN/DIANDRA from BP  irregularities and IV dye exposure. Gradual BP reduction. No NSAIDs. Avoid hypotension and bradycardia. Dose meds for CrCl less than 10 cc/min.     2. ACCELERATED HTN-----BP okay. On Cleviprex and sedation. Follow with UF with HD    3. H/O ETOH ABUSE    4. AAA------per CT Surgery    5. GERD/PUD PROPHYLAXIS-----Renal dose adjusted Pepcid    6. MEDICAL NONCOMPLIANCE    7. RESPIRATORY FAILURE/HYPOXIA-----per Pulmonary/CC    8. HYPOMAGNESEMIA------Replaced    9. HYPOKALEMIA-----Replaced    10. HYPOALBUMINEMIA-----S/P IV Albumin to temporize    11. HYPERNATREMIA------Follow post HD with lower sodium bath    12. ABDOMINAL DISTENSION    13. HYPOALBUMINEMIA------S/P IV Albumin to temporize    14. NUTRITION-----On TPN    15. VOLUME EXCESS------UF with HD     Carin Polk MD  [facility]  [phone]  09/02/21  07:55 EDT

## 2021-09-02 NOTE — NURSING NOTE
CTS (drake and NP) aware of pt arterial -170. labetalol added. Residuals from tube feeds still showing granuals from morning medication. HD to start. Dialysis contacted multiple times today for estimated time for treatment with no time given. Continue to observe

## 2021-09-02 NOTE — PROGRESS NOTES
Cardiology Progress Note      Admiting Physician:  Kayla Carter MD   LOS: 12 days         Cardiology assessment and plan    Type B aortic dissection  Respiratory failure on vent  Abdominal distention  Malignant hypertension currently under multiple medications  History of alcohol abuse  Normal LV systolic function  Persistent hypoxia possible ARDS  Acute on chronic renal failure  CT of the chest and abdomen with a stable dissection from the left subclavian to the SMA true lumen supplies the celiac and SMA arteries      Continue current medical therapy  Labs and medications reviewed  Continue close monitoring  Discussed with family at bedside  Plans for hemodialysis today  Discussed with surgical team  Further recommendations based on patient course              Reason For Followup:  Type B aortic dissection      Subjective:    Interval History:  Patient was seen and examined, chart and labs reviewed.  Out of COVID-19 isolation, Covid swab negative.  Rhythm sinus at 68, pressure stable.  Still sedated, intubated.  Creatinine up, Shiley placed with plans for dialysis today.  100%/+ 19 PEEP.        Review of Systems:  Unable to obtain review of systems    Assessment & Plan    Impressions:  Type B aortic dissection without malperfusion  Malignant hypertension-currently with significant lability  Medically noncompliant  Tobacco abuse  Alcohol abuse    Recommendations:  Continue current medical therapy  Repeat limIted echo performed secondary to acute EKG changes, no significant findings.  EF 56-60%.  Continue to monitor rhythm and pressure closely  Nephrology managing renal aspects-creatinine improving and urine output improving.  Pulmonary managing ventilator.  Sedation resumed  Further recommendations as patient's course progresses.  Prognosis quite guarded.      Seen and examined patient agree with note written by the nurse practitioner  Objective:    Medication Review:   Scheduled Meds:amLODIPine, 10 mg, Oral,  Q24H  arformoterol, 15 mcg, Nebulization, BID - RT  budesonide, 0.5 mg, Nebulization, BID - RT  cloNIDine, 1 patch, Transdermal, Weekly  famotidine, 20 mg, Nasogastric, Daily  folic acid, 1 mg, Oral, Daily  hydrALAZINE, 100 mg, Nasogastric, Q8H  ipratropium-albuterol, 3 mL, Nebulization, Q4H - RT  methylPREDNISolone sodium succinate, 40 mg, Intravenous, Q8H  metoclopramide, 5 mg, Intravenous, Q8H  multivitamin, 1 tablet, Oral, Daily  piperacillin-tazobactam, 3.375 g, Intravenous, Q8H  polyethylene glycol, 17 g, Nasogastric, Daily  sildenafil, 20 mg, Oral, Q8H  sodium chloride, 3 mL, Intravenous, Q12H  thiamine, 100 mg, Nasogastric, Daily      Continuous Infusions:Adult Central Clinimix TPN, , Last Rate: 63 mL/hr at 09/01/21 1834  clevidipine, 2-32 mg/hr, Last Rate: 32 mg/hr (09/02/21 1055)  epoprostenol, 50 ng/kg/min (Ideal), Last Rate: 50 ng/kg/min (09/01/21 0923)  fentanyl 10 mcg/mL,  mcg/hr, Last Rate: 200 mcg/hr (09/02/21 0702)  insulin, 1-20 Units/hr, Last Rate: 4 Units/hr (09/02/21 1202)  labetalol, 0.5-2 mg/min, Last Rate: 0.5 mg/min (08/31/21 0826)  midazolam 1 mg/mL 100mL NS, 1-10 mg/hr, Last Rate: 6 mg/hr (09/02/21 0051)  Pharmacy to Dose TPN,   Pharmacy to Dose Zosyn,       PRN Meds:.•  acetaminophen  •  bisacodyl  •  carboxymethylcellulose sod  •  clevidipine  •  dextrose  •  dextrose  •  dextrose  •  glucagon (human recombinant)  •  hydrALAZINE  •  LORazepam  •  ondansetron **OR** ondansetron  •  Pharmacy to Dose TPN  •  Pharmacy to Dose Zosyn  •  sodium chloride  •  vecuronium    Patient Active Problem List   Diagnosis   • Tobacco abuse   • Hypertension   • Aortic aneurysm and dissection (CMS/HCC)   • Medically noncompliant   • Dissection of aorta (CMS/HCC)   • Alcohol abuse   • Acute respiratory failure with hypoxia (CMS/HCC)   • Pertussis pneumonia   • VANESSA (acute kidney injury)   • COVID-19 vaccine series declined   • Acute encephalopathy   • Pneumonia due to infectious organism   • ARDS (adult  respiratory distress syndrome)   • Hypertensive urgency         Physical Exam:    General: Sedated on ventilator  Head:  Normocephalic, atraumatic, mucous membranes moist  Eyes:  Conjunctivae/corneas clear, EOM's intact     Neck:  Supple,  no bruit  Lungs: Coarse and mechanical  Chest wall:   Heart::  Regular rate and rhythm, S1 and S2 normal, 1/6 holosystolic murmur.  No rub or gallop  Abdomen: Remains distended, bowel sounds hypoactive  Extremities: No cyanosis, clubbing, or edema  Pulses: 2+ and symmetric all extremities  Skin:  No rashes or lesions  Neuro/psych: Sedated on ventilator    Vital Signs:  Vitals:    09/02/21 0600 09/02/21 0700 09/02/21 0807 09/02/21 1135   BP:   145/67 131/64   BP Location:   Right arm Right arm   Patient Position:   Lying Lying   Pulse: 69 73 73 66   Resp:   28 (!) 30   Temp:   (!) 100.8 °F (38.2 °C) 99.4 °F (37.4 °C)   TempSrc:   Axillary Axillary   SpO2: 95% (!) 89% (!) 89% 94%   Weight: 106 kg (232 lb 12.9 oz)      Height:         Wt Readings from Last 1 Encounters:   09/02/21 106 kg (232 lb 12.9 oz)       Intake/Output Summary (Last 24 hours) at 9/2/2021 1237  Last data filed at 9/2/2021 0549  Gross per 24 hour   Intake 2955 ml   Output 3700 ml   Net -745 ml         Results Review:     CBC    Results from last 7 days   Lab Units 09/02/21  0301 09/01/21  0523 08/31/21  0538 08/30/21  0325 08/29/21  0518 08/28/21  0244 08/27/21  0448   WBC 10*3/mm3 22.20* 22.50* 21.10* 21.50* 20.00* 16.00* 16.30*   HEMOGLOBIN g/dL 10.5* 11.6* 11.7* 11.4* 10.9* 11.7* 11.8*   PLATELETS 10*3/mm3 176 170 169 147 145 143 145     Cr Clearance Estimated Creatinine Clearance: 58.3 mL/min (A) (by C-G formula based on SCr of 1.89 mg/dL (H)).  Coag       HbA1C   Lab Results   Component Value Date    HGBA1C 6.0 (H) 08/27/2021     Blood Glucose   Glucose   Date/Time Value Ref Range Status   09/02/2021 1139 378 (H) 70 - 105 mg/dL Final     Comment:     Serial Number: [id number]:  [id number]   09/02/2021  0859 402 (C) 70 - 105 mg/dL Final     Comment:     Serial Number: [id number]:  [id number]   09/02/2021 0705 422 (C) 70 - 105 mg/dL Final     Comment:     Serial Number: [id number]:  [id number]   09/02/2021 0440 415 (C) 70 - 105 mg/dL Final     Comment:     Serial Number: [id number]:  [id number]   09/02/2021 0033 395 (H) 70 - 105 mg/dL Final     Comment:     Serial Number: [id number]:  [id number]   09/01/2021 1752 321 (H) 70 - 105 mg/dL Final     Comment:     Serial Number: [id number]:  [id number]   09/01/2021 1249 375 (H) 70 - 105 mg/dL Final     Comment:     Serial Number: [id number]:  [id number]   09/01/2021 0526 286 (H) 70 - 105 mg/dL Final     Comment:     Serial Number: [id number]:  [id number]     Infection   Results from last 7 days   Lab Units 08/31/21  0909 08/30/21  1415 08/30/21  1333 08/29/21  0518   BLOODCX   --  No growth at 2 days No growth at 2 days  --    RESPCX  No growth  --   --   --    PROCALCITONIN ng/mL  --   --   --  1.28*     CMP   Results from last 7 days   Lab Units 09/02/21  0301 09/01/21  0523 08/31/21  0537 08/30/21  1157 08/30/21  0325 08/29/21  1246 08/29/21  0518 08/28/21  0244 08/28/21  0244 08/27/21  0809 08/27/21  0448   SODIUM mmol/L 151* 150* 147* 149* 148*  --  150*  --  151*  --    < >   POTASSIUM mmol/L 4.4 4.7 4.5 4.4 4.6  --  4.1  --  4.1  --    < >   CHLORIDE mmol/L 118* 118* 115* 116* 116*  --  118*  --  118*  --    < >   CO2 mmol/L 21.0* 21.0* 21.0* 22.0 21.0*  --  20.0*  --  20.0*  --    < >   BUN mg/dL 101* 89* 81* 83* 85*  --  85*  --  65*  --    < >   CREATININE mg/dL 1.89* 1.68* 1.84* 1.96* 2.06*  --  2.23*  --  1.90*  --    < >   GLUCOSE mg/dL 450* 340* 177* 149* 150*  --  147*  --  158*  --    < >   ALBUMIN g/dL 3.20* 2.50* 2.50* 2.50* 2.50* 2.50* 2.50*   < > 2.80*  --    < >   BILIRUBIN mg/dL 1.7* 1.9* 2.6* 2.1* 1.9* 1.3*  --   --  0.9  --   --    ALK PHOS U/L 93 99 108 104 106 111  --   --  143*  --   --    AST (SGOT) U/L  18 18 20 19 14 13  --   --  26  --   --    ALT (SGPT) U/L 35 31 29 27 27 26  --   --  43*  --   --    AMMONIA umol/L  --   --   --   --   --   --   --   --   --  33  --     < > = values in this interval not displayed.     ABG    Results from last 7 days   Lab Units 09/02/21  0359 08/31/21  0609 08/31/21  0358 08/30/21  0329 08/29/21  0310 08/28/21  1614 08/28/21  0343   PH, ARTERIAL pH units 7.341* 7.280* 7.320* 7.313* 7.381 7.380 7.441   PCO2, ARTERIAL mm Hg 41.5 48.3* 43.5 43.0 35.2 34.9* 30.2*   PO2 ART mm Hg 84.2 51.4* 72.5* 67.4* 75.6* 47.3* 75.2*   O2 SATURATION ART % 95.6 81.1* 93.0* 91.3* 94.9 82.4* 95.7   BASE EXCESS ART mmol/L -3.2* -4.2* -3.6* -4.3* -3.7* -3.9* -2.7*     UA        YUE  No results found for: POCMETH, POCAMPHET, POCBARBITUR, POCBENZO, POCCOCAINE, POCOPIATES, POCOXYCODO, POCPHENCYC, POCPROPOXY, POCTHC, POCTRICYC  Lysis Labs   Results from last 7 days   Lab Units 09/02/21  0301 09/01/21  0523 08/31/21  0538 08/31/21  0537 08/30/21  1157 08/30/21  0325 08/29/21  0518 08/28/21  0244 08/27/21  0448 08/27/21  0448   HEMOGLOBIN g/dL 10.5* 11.6* 11.7*  --   --  11.4* 10.9* 11.7*  --  11.8*   PLATELETS 10*3/mm3 176 170 169  --   --  147 145 143  --  145   CREATININE mg/dL 1.89* 1.68*  --  1.84* 1.96* 2.06* 2.23* 1.90*   < > 1.54*    < > = values in this interval not displayed.     Radiology(recent) XR Chest 1 View    Result Date: 9/2/2021  Diffuse bilateral infiltrates persist greater on the left than the right. Aeration is similar to the prior study. Tubes and lines appear properly positioned.  Electronically Signed By-Roel Morin MD On:9/2/2021 10:43 AM This report was finalized on [id number] by  Roel Morni MD.    XR Chest 1 View    Result Date: 9/1/2021  1.Endotracheal tube in good position. 2.Patchy bilateral airspace consolidations appear slightly improved on the right, which could reflect pneumonia, pulmonary edema, or ARDS.  Electronically Signed By-Shahriar Sutton MD On:9/1/2021 7:52  AM This report was finalized on [id number] by  Shahriar Sutton MD.              Imaging Results (Last 24 Hours)     Procedure Component Value Units Date/Time    XR Chest 1 View [[id number]] Collected: 09/02/21 1041     Updated: 09/02/21 1045    Narrative:      Examination: XR CHEST 1 VW-     Date of Exam: 9/2/2021 10:23 AM     Indication: worsening hypoxia, f/u ptx; I71.00-Dissection of unspecified  site of aorta; J96.01-Acute respiratory failure with hypoxia;  J18.9-Pneumonia, unspecified organism.     Comparison: September 1, 2021     Technique: 1 view of the chest      Findings:  There is a right internal jugular central line with the tip in the  superior vena cava. There is an endotracheal tube at the level of  clavicles. There is a nasogastric tube below the diaphragm. There are  diffuse bilateral alveolar airspace opacities greater on the left than  the right and appearing similar to the prior study. There is no definite  pneumothorax.       Impression:      Diffuse bilateral infiltrates persist greater on the left than the  right. Aeration is similar to the prior study. Tubes and lines appear  properly positioned.     Electronically Signed By-Roel Morin MD On:9/2/2021 10:43 AM  This report was finalized on [id number] by  Roel Morin MD.          Cardiac Studies:  Echo- Results for orders placed during the hospital encounter of 08/21/21    Adult Transthoracic Echo Limited W/ Cont if Necessary Per Protocol    Interpretation Summary  · Saline test results are negative for right to left atrial level shunt.    Stress Myoview-  Cath-        DEVANTE Robbins  09/02/21  12:37 EDT     Electronically signed by DEVANTE Robbins, 09/02/21, 12:37 PM EDT.

## 2021-09-02 NOTE — PROGRESS NOTES
Nutrition Services    Patient Name: Geovanny Em  YOB: 1975  MRN: [id number]  Admission date: 8/21/2021    Comments:     TPN Prescription: Clinimix 5/20 1.5 Liter, kcal from cleviprex    PPE Documentation        PPE Worn By Provider Surgical mask, eye protection   PPE Worn By Patient  None by pt, 2 visitors wearing masks     CLINICAL NUTRITION ASSESSMENT       Reason for Assessment Follow up protocol    8/30/21: TPN consult + Follow up protocol  8/23/21: Tube feed consult     H&P:      Past Medical History:   Diagnosis Date   • Alcohol abuse 8/21/2021   • Hypertension 8/21/2021   • Medically noncompliant 8/21/2021   • Tobacco abuse 8/21/2021       Past Surgical History:   Procedure Laterality Date   • BRONCHOSCOPY N/A 8/23/2021    Procedure: BRONCHOSCOPY AT BEDSIDE;  Surgeon: Rubin Laird MD;  Location: [facility] ENDOSCOPY;  Service: Pulmonary;  Laterality: N/A;  post:broncoalveolar washing   • BRONCHOSCOPY N/A 8/27/2021    Procedure: BRONCHOSCOPY WITH BRONCHOALVEOLAR LAVAGE AT BEDSIDE;  Surgeon: Rubin Laird MD;  Location: [facility] ENDOSCOPY;  Service: Pulmonary;  Laterality: N/A;        Current Problems:     Aortic aneurysm and dissection, without rupture  CT Chest, Abdomen, & Pelvis: acute aortic dissection beginning near the level of the left subclavian artery and propagating inferiorly just past the level of the celiac artery as above. It measures approximately 30 cm in length.  -cardiothoracic surgery following    Acute respiratory failure with hypoxia  Pertussis pneumonia  Staphylococcus aureus pneumonia  -intubated 8/23    Suspected pulmonary hypertension    Hypertensive urgency    Abdominal distention with increased bladder pressure    Acute encephalopathy    Tobacco Abuse    ETOH abuse  -12 to 15 beers daily    VANESSA  -nephrology following, starting HD     Nutrition/Diet History         Narrative     9/2: Patient discussed during AM rounds with critical care team, continues on TPN for nutrition  "support. Pt was too unstable to go down for abdominal CT yesterday. RD visited at bedside, observed TPN infusing and cleviprex. D/w [facility] Valentina, will leave TPN macros the same for now d/t excessive kcal from lipid based medication. Not concerned with overfeeding as pt was in deficit d/t interruptions in nutrition prior to initiation of TPN. Will continue to monitor and adjust regimen. Starting on insulin gtt.    8/30: Patient discussed during AM rounds with critical care team, addressed issues r/t difficulty feeding. Pt was on from 8/23-8/27 however have been off x3 days d/t issues with abd distention, increased abd pressure, and possible abdominal compartment syndrome. Given interruptions, pt has had suboptimal nutrition intake throughout admission and RD recommended consideration of TPN as does not appear appropriate to resume TF. Dr. Carter and NP Nicolasa in agreement. discusssed TPN plan of care with [facility] Windy, will start clinimix 5/15 1 liter in case of risk of RFS and will titrate up as appropriate. Other MD in room this date, did not enter room for this encounter.    8/25: Patient discussed during AM rounds with critical care team, TF were off for possible surgery/intervention however okay to resume. RD visited at bedside, significant other present and reports good appetite PTA, no unintentional wt loss. RD addressed nutrition plan of care    8/23: TF consult received. Pt in bronchoscopy. Chart reviewed and d/t RD time constraint, will follow up for in person visit tomorrow. May be at risk for RFS given EToH use, will start TF conservatively   Functional Status Independent at baseline     Food Allergies NKFA       Anthropometrics        Current Height, Weight Height: 177.8 cm (70\")  Weight: 106 kg (232 lb 12.9 oz) (09/02/21 0600)        Admit Height, Weight     Flowsheet Rows      First Filed Value   Admission Height  177.8 cm (70\") Documented at 08/21/2021 0136   Admission Weight  94.3 kg (207 lb 14.3 oz) " Documented at 08/21/2021 0136             Ideal Body Weight (IBW) 166 lb   % Ideal Body Weight 140%       Usual Body Weight UTD   % Usual Body Weight UTD   Wt Change Observation Current Admission:  9/2: CBW trending up, 14.5 Liters positive since admit  8/30: CBW trending up, 15.7 Liters positive since admit  8/25: CBW trending up, 3 Liters positive since admit  8/23: CBW trending up from admit, 3.7 Liters positive    PTA:  8/23: No wt hx PTA   Weight Hx    Wt Readings from Last 30 Encounters:   09/02/21 0600 106 kg (232 lb 12.9 oz)   09/02/21 0519 106 kg (232 lb 12.9 oz)   09/01/21 0500 106 kg (232 lb 12.9 oz)   08/29/21 0433 104 kg (230 lb 6.1 oz)   08/28/21 0500 106 kg (232 lb 12.9 oz)   08/27/21 0500 104 kg (228 lb 9.9 oz)   08/26/21 0540 101 kg (222 lb 7.1 oz)   08/25/21 0500 101 kg (221 lb 12.5 oz)   08/24/21 1237 103 kg (226 lb)   08/24/21 0620 103 kg (226 lb 13.7 oz)   08/23/21 0600 99.8 kg (220 lb 0.3 oz)   08/22/21 0959 99.8 kg (220 lb)   08/22/21 0347 100 kg (220 lb 14.4 oz)   08/21/21 1421 93.9 kg (207 lb)   08/21/21 0540 94.1 kg (207 lb 7.3 oz)   08/21/21 0136 94.3 kg (207 lb 14.3 oz)        BMI kg/m2 Body mass index is 33.4 kg/m².       Labs/Medications         Pertinent Labs    Results from last 7 days   Lab Units 09/02/21  0301 09/01/21  0523 08/31/21  0537   SODIUM mmol/L 151* 150* 147*   POTASSIUM mmol/L 4.4 4.7 4.5   CHLORIDE mmol/L 118* 118* 115*   CO2 mmol/L 21.0* 21.0* 21.0*   BUN mg/dL 101* 89* 81*   CREATININE mg/dL 1.89* 1.68* 1.84*   CALCIUM mg/dL 9.4 8.8 8.7   BILIRUBIN mg/dL 1.7* 1.9* 2.6*   ALK PHOS U/L 93 99 108   ALT (SGPT) U/L 35 31 29   AST (SGOT) U/L 18 18 20   GLUCOSE mg/dL 450* 340* 177*     Results from last 7 days   Lab Units 09/02/21  0301 09/01/21  0523 09/01/21  0523 08/31/21  0538 08/31/21  0537 08/30/21  1415 08/30/21  1157   MAGNESIUM mg/dL 2.9*  --  3.1*  --  3.0*   < > 3.0*   PHOSPHORUS mg/dL 4.0   < > 5.0*   < > 5.5*   < > 5.4*   HEMOGLOBIN g/dL 10.5*   < > 11.6*   < >   --   --   --    HEMATOCRIT % 32.8*   < > 34.9*   < >  --   --   --    TRIGLYCERIDES mg/dL  --   --   --   --   --   --  157*    < > = values in this interval not displayed.     SARS-CoV-2, DISHA   Date Value Ref Range Status   08/31/2021 Not Detected Not Detected Final     Comment:     This nucleic acid amplification test was developed and its performance  characteristics determined by [employer]. Nucleic acid  amplification tests include RT-PCR and TMA. This test has not been  FDA cleared or approved. This test has been authorized by FDA under  an Emergency Use Authorization (EUA). This test is only authorized  for the duration of time the declaration that circumstances exist  justifying the authorization of the emergency use of in vitro  diagnostic tests for detection of SARS-CoV-2 virus and/or diagnosis  of COVID-19 infection under section 564(b)(1) of the Act, 21 U.S.C.  360bbb-3(b) (1), unless the authorization is terminated or revoked  sooner.  When diagnostic testing is negative, the possibility of a false  negative result should be considered in the context of a patient's  recent exposures and the presence of clinical signs and symptoms  consistent with COVID-19. An individual without symptoms of COVID-19  and who is not shedding SARS-CoV-2 virus would expect to have a  negative (not detected) result in this assay.     Lab Results   Component Value Date    HGBA1C 6.0 (H) 08/27/2021         Pertinent Medications Catapres, pepcid, folic acid, solu-medrol, reglan, MVI, zosyn, miralax, thiamine, cleviprex at 64 mL/hr (~3072 kcals/day), veletri, fentanyl gtt, insulin gtt, versed PRN     Physical Findings        Overall Physical   Appearance, MSA 9/2: visually appears well nourished  8/30: Unable to observe this date  8/25: visually appears well nourished  8/23: Unable to observe this date   -  Edema  2+ generalized edema, BL arms, hands, LE  3+ abdomen, scrotum     Gastrointestinal Stool Output  Stool  "(mL): 0 mL (08/30/21 0513)  Stool Unmeasured Occurrence: 1 (08/26/21 1400)  Bowel Incontinence: Yes (08/26/21 1400)  Stool Amount: large (08/26/21 1400)     BM documented on 8/26 (x7 days ago) - on bowel regimen per primary, has also been NPO on TPN   Tubes OGT     Oral/Mouth Cavity No reported chew/swallow issues at baseline     Skin Stage II PI documented on left and right foot       Estimated/Assessed Needs    Re-estimated 8/30/2021  *remains current 9/2/2021   Energy Requirements    Height for Calculation  Height: 177.8 cm (70\")   Weight for Calculation 99.8 kg (220 lb) - appears dry wt   Method for Estimation  [facility] 2003b using 17.4 V in L/min, 36.9 Tmax in C   EST Needs (kcal/day) 2291 kcals/day       Protein Requirements    Weight for Calculation 99.8 kg (220 lb)   EST Protein Needs (g/kg) 1.2 g/kg   EST Daily Needs (g/day) 120 g/day       Fluid Requirements     Estimated Needs (mL/day) 2291 mL->1 mL/kcal    Adjust based on hydration status (currently hypernatremic, nephrology is following)       Fluid Deficit    Current Na Level (mEq/L) -   Desired Na Level (mEq/L) -   Estimated Fluid Deficit (L)  -     Current Nutrition Orders & Evaluation of Received Nutrient/Fluid Intake       Oral Nutrition     Current PO Diet NPO Diet   Supplement -   PO Evaluation     % PO Intake 9/2: 0% - NPO, on TPN  8/30: 0% - NPO  8/25: 0% - NPO, on TF  8/23: No meals documented when briefly on PO diet   -  Enteral Nutrition    Enteral Route OGT   TF Modular -   TF Delivery Method -   Current Ordered TF  -   Current Ordered H2O flush  -    TF Observation  -   EN Evaluation     TF Changes -    TF Residual -    TF Tolerance -    Average EN Delivered -       Parenteral Nutrition     TPN Route Right IJ   Current Ordered TPN VOL  Clinimix 5/20 1500 mL   Dextrose (g/kcals)  300 g detrose (1020 kcals)   Amino Acid (g/kcals) 75 g AA (300 kcals)   Lipids (mL/Concentration/FREQ)  Held while on clevidipine   MVI Frequency  Monday through " friday   Trace Element Frequency  Trace elements held d/t elevated bilirubin   TPN Observation  Visually observed TPN infusing at 63 mL/hr   TPN Evaluation    Total # Days on TPN TPN started 8/30, today is day #3   -  Clinical Course    Nutrition Course Details PO Diet:  8/21 NPO, then HH  8/22 to present (9/2) NPO    Nutrition Support:  TF started 8/23, reached goal 8/26. TF stopped 8/27 through 8/30    TPN started 8/30, conservatively advancing to goal     Nutritional Risk Screening        NRS-2002 Score   -       Nutrition Diagnosis         Nutrition Dx Problem 1 Inadequate oral intake r/t intubation AEB NPO diet order    Nutrition Dx Problem 2 Altered GI function r/t alteration in GI function AEB possible abdominal compartment syndrome       Intervention Goal         Intervention Goal(s) Tolerate TPN support     Nutrition Intervention        RD/Tech Action Discussed TPN regimen with [facility] Valentina     Nutrition Prescription          Diet Prescription NPO   Supplement Prescription -   -  Enteral Prescription -       TPN Prescription Current Goal:  *advancement conservative d/t risk of RFS    Clinimix 5/20 1.5 Liter volume, kcal from cleviprex    TPN providing 1320 kcal + 3072 kcals = 4392 kcals. This is obviously well over the estimated needs, however okay at this time given pt was in a caloric deficit d/t acute medical complications resulting in barriers to optimal nutrition. Cleviprex is likely to be weaned off and will monitor clinical course    End Goal:    Clinimix 5/20  2400 mL volume provides 120 g AA (480 kcal, 100% estimated needs), 480 g dextrose (1632 kcal) = 2112 kcals + 250 mL 20% lipids 2x per week (average of 215 kcal/day) = 2327 kcals (102% estimated needs)    Dextrose infusion rate of 3.4 g/kg/min using 100 kg     Monitor/Evaluation        Monitor Advancement of TPN support, BMs, N/V, abd pain/distention, labs (electrolytes, BUN/Crt, glucose, LFTs, TG), wt for changes, skin integrity, at next  encounter.     Electronically signed by:  Remedios Roberts RD  09/02/21 10:56 EDT

## 2021-09-03 NOTE — PLAN OF CARE
Goal Outcome Evaluation:            Pt remains on cleviprex and labetolol drips to maintain SBP <170. Has been given PRN hydralazine as well to try and help. Continues to be on fentanyl, TPN, and insulin drips. Per DEVANTE Farmer, okay to go up on insulin higher than order (order says 1-20 units/hr). No cough, gag, or pain reflex. Corneal reflex intact.   Still waiting for dialysis to show up. Called multiple times with no known time frame when they will arrive yet. Will continue to monitor.

## 2021-09-03 NOTE — PROCEDURES
"Non-Tunneled Catheter    Date/Time: 9/2/2021 10:15 AM  Performed by: Ra Geiger APRN  Authorized by: Ra Geiger APRN   Consent: Written consent obtained.  Risks and benefits: risks, benefits and alternatives were discussed  Consent given by: Next of kin.  Patient understanding: patient states understanding of the procedure being performed  Patient consent: the patient's understanding of the procedure matches consent given  Procedure consent: procedure consent matches procedure scheduled  Relevant documents: relevant documents present and verified  Required items: required blood products, implants, devices, and special equipment available  Patient identity confirmed: arm band, anonymous protocol, patient vented/unresponsive and hospital-assigned identification number  Time out: Immediately prior to procedure a \"time out\" was called to verify the correct patient, procedure, equipment, support staff and site/side marked as required.  Indications: vascular access  Anesthesia: local infiltration    Anesthesia:  Local Anesthetic: lidocaine 1% without epinephrine  Anesthetic total: 5 mL    Sedation:  Patient sedated: no    Preparation: skin prepped with ChloraPrep  Skin prep agent dried: skin prep agent completely dried prior to procedure  Sterile barriers: all five maximum sterile barriers used - cap, mask, sterile gown, sterile gloves, and large sterile sheet  Hand hygiene: hand hygiene performed prior to central venous catheter insertion  Location details: left internal jugular  Patient position: flat  Catheter type: triple lumen  Catheter size: 12 Fr. x 20 cm Mahurkar High Pressure Triple Lumen Catheter.  Ultrasound guidance: yes (Sterile probe cover utilized.)  Number of attempts: 1  Successful placement: yes  Post-procedure: line sutured and dressing applied  Assessment: free fluid flow,  placement verified by x-ray and no pneumothorax on x-ray  Patient tolerance: patient tolerated the procedure well with " no immediate complications        Electronically signed by DEVANTE Mancia, 09/02/21, 8:54 PM EDT.    Procedure and note  was supervised /reviewed with needed  addendum   Kayla Carter MD, D,ABSM  9/2/2021

## 2021-09-03 NOTE — PROGRESS NOTES
NEPHROLOGY PROGRESS NOTE------[facility]    [facility]  [phone]  Carin Polk MD      Patient Care Team:  Provider, No Known as PCP - Ranulfo Marinelli MD as Consulting Physician (Nephrology)      Provider:  Carin Polk MD  Patient Name: Geovanny Em  :  1975    SUBJECTIVE:    F/U ARF/VANESSA/HTN    SEDATED AND INTUBATED ON VENT. Seen and examined during HD tx this AM  (Patient's HD was delayed until this AM WITHOUT anyone notifying me).    Medication:  amLODIPine, 10 mg, Oral, Q24H  arformoterol, 15 mcg, Nebulization, BID - RT  budesonide, 0.5 mg, Nebulization, BID - RT  cloNIDine, 1 patch, Transdermal, Weekly  famotidine, 20 mg, Nasogastric, Daily  folic acid, 1 mg, Oral, Daily  hydrALAZINE, 100 mg, Nasogastric, Q8H  ipratropium-albuterol, 3 mL, Nebulization, Q4H - RT  iron sucrose, 100 mg, Intravenous, Once  methylPREDNISolone sodium succinate, 40 mg, Intravenous, Q8H  metoclopramide, 5 mg, Intravenous, Q8H  multivitamin, 1 tablet, Oral, Daily  piperacillin-tazobactam, 4.5 g, Intravenous, Q12H  polyethylene glycol, 17 g, Nasogastric, Daily  sildenafil, 20 mg, Oral, Q8H  sodium chloride, 3 mL, Intravenous, Q12H  thiamine, 100 mg, Nasogastric, Daily      Adult Central Clinimix TPN, , Last Rate: 63 mL/hr at 21 1716  clevidipine, 2-32 mg/hr, Last Rate: 28 mg/hr (21 0800)  epoprostenol, 50 ng/kg/min (Ideal), Last Rate: 50 ng/kg/min (21 0923)  fentanyl 10 mcg/mL,  mcg/hr, Last Rate: 300 mcg/hr (21 2302)  insulin, 1-20 Units/hr, Last Rate: 8 Units/hr (21 0742)  labetalol, 0.5-2 mg/min, Last Rate: 0.5 mg/min (21 0742)  midazolam 1 mg/mL 100mL NS, 1-10 mg/hr, Last Rate: Stopped (21 1400)  Pharmacy to Dose TPN,   Pharmacy to Dose Zosyn,         OBJECTIVE    Vital Sign Min/Max for last 24 hours  Temp  Min: 99.2 °F (37.3 °C)  Max: 99.9 °F (37.7 °C)   BP  Min: 128/63  Max: 161/65  "  Pulse  Min: 63  Max: 70   Resp  Min: 15  Max: 30   SpO2  Min: 88 %  Max: 97 %   No data recorded   Weight  Min: 108 kg (238 lb 12.1 oz)  Max: 108 kg (238 lb 12.1 oz)     Flowsheet Rows      First Filed Value   Admission Height  177.8 cm (70\") Documented at 08/21/2021 0136   Admission Weight  94.3 kg (207 lb 14.3 oz) Documented at 08/21/2021 0136          No intake/output data recorded.  I/O last 3 completed shifts:  In: 7303 [I.V.:5795]  Out: 4700 [Urine:4700]    Physical Exam:  ON CLEVIPREX  General Appearance: SEDATED AND INTUBATED ON VENT  Head: normocephalic, without obvious abnormality and atraumatic +ETT INTACT/NGT  Eyes: conjunctivae and sclerae normal and no icterus  Neck: supple and no JVD  Lungs: diminished breath sounds   Heart: regular rhythm & normal rate and normal S1, S2 +SASCHA  Chest: Wall no abnormalities observed  Abdomen: ABDOMEN DISTENDED +HYPOACTIVE BS +CRUZ  Extremities: No edema, no cyanosis and no redness  Skin: no bleeding, bruising or rash, turgor normal, color normal and no lesions noted  Neurologic: INTUBATED ON VENT     Labs:    WBC WBC   Date Value Ref Range Status   09/03/2021 28.70 (H) 3.40 - 10.80 10*3/mm3 Final   09/02/2021 22.20 (H) 3.40 - 10.80 10*3/mm3 Final   09/01/2021 22.50 (H) 3.40 - 10.80 10*3/mm3 Final      HGB Hemoglobin   Date Value Ref Range Status   09/03/2021 11.1 (L) 13.0 - 17.7 g/dL Final   09/02/2021 10.5 (L) 13.0 - 17.7 g/dL Final   09/01/2021 11.6 (L) 13.0 - 17.7 g/dL Final      HCT Hematocrit   Date Value Ref Range Status   09/03/2021 32.2 (L) 37.5 - 51.0 % Final   09/02/2021 32.8 (L) 37.5 - 51.0 % Final   09/01/2021 34.9 (L) 37.5 - 51.0 % Final      Platlets No results found for: LABPLAT   MCV MCV   Date Value Ref Range Status   09/03/2021 98.2 (H) 79.0 - 97.0 fL Final   09/02/2021 97.4 (H) 79.0 - 97.0 fL Final   09/01/2021 98.9 (H) 79.0 - 97.0 fL Final          Sodium Sodium   Date Value Ref Range Status   09/03/2021 161 (C) 136 - 145 mmol/L Final   09/02/2021 " 151 (H) 136 - 145 mmol/L Final   09/01/2021 150 (H) 136 - 145 mmol/L Final      Potassium Potassium   Date Value Ref Range Status   09/03/2021 3.8 3.5 - 5.2 mmol/L Final     Comment:     Slight hemolysis detected by analyzer. Results may be affected.   09/02/2021 4.4 3.5 - 5.2 mmol/L Final   09/01/2021 4.7 3.5 - 5.2 mmol/L Final      Chloride Chloride   Date Value Ref Range Status   09/03/2021 130 (H) 98 - 107 mmol/L Final   09/02/2021 118 (H) 98 - 107 mmol/L Final   09/01/2021 118 (H) 98 - 107 mmol/L Final      CO2 CO2   Date Value Ref Range Status   09/03/2021 18.0 (L) 22.0 - 29.0 mmol/L Final   09/02/2021 21.0 (L) 22.0 - 29.0 mmol/L Final   09/01/2021 21.0 (L) 22.0 - 29.0 mmol/L Final      BUN BUN   Date Value Ref Range Status   09/03/2021 92 (H) 6 - 20 mg/dL Final   09/02/2021 101 (H) 6 - 20 mg/dL Final   09/01/2021 89 (H) 6 - 20 mg/dL Final      Creatinine Creatinine   Date Value Ref Range Status   09/03/2021 1.80 (H) 0.76 - 1.27 mg/dL Final   09/02/2021 1.89 (H) 0.76 - 1.27 mg/dL Final   09/01/2021 1.68 (H) 0.76 - 1.27 mg/dL Final      Calcium Calcium   Date Value Ref Range Status   09/03/2021 9.2 8.6 - 10.5 mg/dL Final   09/02/2021 9.4 8.6 - 10.5 mg/dL Final   09/01/2021 8.8 8.6 - 10.5 mg/dL Final      PO4 No components found for: PO4   Albumin Albumin   Date Value Ref Range Status   09/03/2021 3.00 (L) 3.50 - 5.20 g/dL Final   09/02/2021 3.20 (L) 3.50 - 5.20 g/dL Final   09/01/2021 2.50 (L) 3.50 - 5.20 g/dL Final      Magnesium Magnesium   Date Value Ref Range Status   09/03/2021 2.7 (H) 1.6 - 2.6 mg/dL Final   09/02/2021 2.9 (H) 1.6 - 2.6 mg/dL Final   09/01/2021 3.1 (H) 1.6 - 2.6 mg/dL Final      Uric Acid No components found for: URIC ACID     Imaging Results (Last 72 Hours)     Procedure Component Value Units Date/Time    XR Chest 1 View [[id number]] Collected: 09/02/21 1041     Updated: 09/02/21 1045    Narrative:      Examination: XR CHEST 1 VW-     Date of Exam: 9/2/2021 10:23 AM     Indication:  worsening hypoxia, f/u ptx; I71.00-Dissection of unspecified  site of aorta; J96.01-Acute respiratory failure with hypoxia;  J18.9-Pneumonia, unspecified organism.     Comparison: September 1, 2021     Technique: 1 view of the chest      Findings:  There is a right internal jugular central line with the tip in the  superior vena cava. There is an endotracheal tube at the level of  clavicles. There is a nasogastric tube below the diaphragm. There are  diffuse bilateral alveolar airspace opacities greater on the left than  the right and appearing similar to the prior study. There is no definite  pneumothorax.       Impression:      Diffuse bilateral infiltrates persist greater on the left than the  right. Aeration is similar to the prior study. Tubes and lines appear  properly positioned.     Electronically Signed By-Roel Morin MD On:9/2/2021 10:43 AM  This report was finalized on [id number] by  Roel Morin MD.    XR Chest 1 View [[id number]] Collected: 09/01/21 0751     Updated: 09/01/21 0754    Narrative:      XR CHEST 1 VW-     Date of Exam: 9/1/2021 5:31 AM     Indication: Shortness of breath; I71.00-Dissection of unspecified site  of aorta; J96.01-Acute respiratory failure with hypoxia;  J18.9-Pneumonia, unspecified organism.     Comparison Exams: August 31, 2021     Technique: Single AP chest radiograph     FINDINGS:  An endotracheal tube has its tip in the midtrachea. An NG tube has its  tip below the diaphragm. A right internal jugular catheter has its tip  in the right atrium. Patchy bilateral airspace consolidations appear  slightly improved on the right. The heart and mediastinal contours  appear stable. The osseous structures appear intact.       Impression:      1.Endotracheal tube in good position.  2.Patchy bilateral airspace consolidations appear slightly improved on  the right, which could reflect pneumonia, pulmonary edema, or ARDS.     Electronically Signed By-Shahriar Sutton MD On:9/1/2021  7:52 AM  This report was finalized on [id number] by  Shahriar Sutton MD.          Results for orders placed during the hospital encounter of 08/21/21    XR Chest 1 View    Narrative  Examination: XR CHEST 1 VW-    Date of Exam: 9/2/2021 10:23 AM    Indication: worsening hypoxia, f/u ptx; I71.00-Dissection of unspecified  site of aorta; J96.01-Acute respiratory failure with hypoxia;  J18.9-Pneumonia, unspecified organism.    Comparison: September 1, 2021    Technique: 1 view of the chest    Findings:  There is a right internal jugular central line with the tip in the  superior vena cava. There is an endotracheal tube at the level of  clavicles. There is a nasogastric tube below the diaphragm. There are  diffuse bilateral alveolar airspace opacities greater on the left than  the right and appearing similar to the prior study. There is no definite  pneumothorax.    Impression  Diffuse bilateral infiltrates persist greater on the left than the  right. Aeration is similar to the prior study. Tubes and lines appear  properly positioned.    Electronically Signed By-Roel Morin MD On:9/2/2021 10:43 AM  This report was finalized on [id number] by  Roel Morin MD.      XR Chest 1 View    Narrative  XR CHEST 1 VW-    Date of Exam: 8/31/2021 6:06 AM    Indication: decreased O2 sat; per Selma Holley, APRN;  I71.00-Dissection of unspecified site of aorta; J96.01-Acute respiratory  failure with hypoxia; J18.9-Pneumonia, unspecified organism.    Comparison Exams: August 29, 2021    Technique: Single AP chest radiograph    FINDINGS:  Endotracheal tube has its tip in the midtrachea. An NG tube has its tip  below the diaphragm. A right internal jugular catheter has its tip at  the cavoatrial junction. Diffuse bilateral airspace consolidations are  slightly improved on the right. The heart and mucosal contours appear  stable. The osseous structures appear intact.    Impression  1.Endotracheal tube in good  position.  2.Diffuse bilateral airspace consolidations appear slightly improved on  the right, which could represent pneumonia, pulmonary edema, or ARDS.    Electronically Signed By-Shahriar Sutton MD On:8/31/2021 7:44 AM  This report was finalized on [id number] by  Shahriar Sutton MD.      XR Chest 1 View    Narrative  XR CHEST 1 VW-    Date of Exam: 9/1/2021 5:31 AM    Indication: Shortness of breath; I71.00-Dissection of unspecified site  of aorta; J96.01-Acute respiratory failure with hypoxia;  J18.9-Pneumonia, unspecified organism.    Comparison Exams: August 31, 2021    Technique: Single AP chest radiograph    FINDINGS:  An endotracheal tube has its tip in the midtrachea. An NG tube has its  tip below the diaphragm. A right internal jugular catheter has its tip  in the right atrium. Patchy bilateral airspace consolidations appear  slightly improved on the right. The heart and mediastinal contours  appear stable. The osseous structures appear intact.    Impression  1.Endotracheal tube in good position.  2.Patchy bilateral airspace consolidations appear slightly improved on  the right, which could reflect pneumonia, pulmonary edema, or ARDS.    Electronically Signed By-Shahriar Sutton MD On:9/1/2021 7:52 AM  This report was finalized on [id number] by  Shahriar Sutton MD.            ASSESSMENT / PLAN      Aortic aneurysm and dissection (CMS/HCC)    Tobacco abuse    Hypertension    Medically noncompliant    Dissection of aorta (CMS/HCC)    Alcohol abuse    Acute respiratory failure with hypoxia (CMS/HCC)    Pertussis pneumonia    VANESSA (acute kidney injury)    COVID-19 vaccine series declined    Acute encephalopathy    Pneumonia due to infectious organism    ARDS (adult respiratory distress syndrome)    Hypertensive urgency    1. ARF/VANESSA-----Nonoliguric. HD today and follow.  +ATN/DIANDRA from BP irregularities and IV dye exposure. Gradual BP reduction. No NSAIDs.  Dose meds for CrCl less than 10 cc/min.      2. ACCELERATED HTN-----BP okay. On Labetalol gtt.  Follow with UF with HD    3. H/O ETOH ABUSE    4. AAA------per CT Surgery    5. GERD/PUD PROPHYLAXIS-----Renal dose adjusted Pepcid    6. MEDICAL NONCOMPLIANCE    7. RESPIRATORY FAILURE/HYPOXIA-----per Pulmonary/CC    8. HYPOMAGNESEMIA------Replaced    9. HYPOKALEMIA-----Replaced    10. HYPOALBUMINEMIA-----S/P IV Albumin to temporize    11. HYPERNATREMIA------Follow post HD with lower sodium bath    12. ABDOMINAL DISTENSION    13. HYPOALBUMINEMIA------S/P IV Albumin to temporize    14. NUTRITION-----On TPN    15. VOLUME EXCESS------UF with HD     16. ACIDOSIS------Metabolic. No elevation in AGAP. Follow post HD today    Carin Polk MD  [facility]  [phone]  09/03/21  08:13 EDT

## 2021-09-03 NOTE — PROGRESS NOTES
F/U:  Type B aortic dissection--Pagni  EF 55-60% (echo)    Subjective:  Intubated/sedated    Hypoxia continues, remains on 100%/+17 PEEP  HD this morning, na up to 161 (151)  TPN   PCR COVID-19 negative  COVID-19 neg x2 (last test was 8/29)  Drips:  Fentanyl, TPN, Cleviprex 21, labetalol .5, insulin   Wt up 14 kgs since admission        Intake/Output Summary (Last 24 hours) at 9/3/2021 0839  Last data filed at 9/3/2021 0600  Gross per 24 hour   Intake 5198 ml   Output 2000 ml   Net 3198 ml     Temp:  [99.2 °F (37.3 °C)-99.9 °F (37.7 °C)] 99.2 °F (37.3 °C)  Heart Rate:  [63-70] 66  Resp:  [15-30] 15  BP: (128-161)/(60-72) 128/63  Arterial Line BP: (130-164)/(43-67) 130/43  FiO2 (%):  [100 %] 100 %      Results from last 7 days   Lab Units 09/03/21  0303 09/02/21  0301   WBC 10*3/mm3 28.70* 22.20*   HEMOGLOBIN g/dL 11.1* 10.5*   HEMATOCRIT % 32.2* 32.8*   PLATELETS 10*3/mm3 154 176     Results from last 7 days   Lab Units 09/03/21  0303   CREATININE mg/dL 1.80*   POTASSIUM mmol/L 3.8   SODIUM mmol/L 161*   MAGNESIUM mg/dL 2.7*   PHOSPHORUS mg/dL 2.6       Physical Exam:  Intubated/sedated, +gag/corneal reflexes  Tele:  SB 60s  Vent--100% +17 PEEP, coarse sounds  abd distended, no BM  Generalized edema      Assessment/Plan:  Principal Problem:    Aortic aneurysm and dissection (CMS/HCC)  Active Problems:    Tobacco abuse    Hypertension    Medically noncompliant    Dissection of aorta (CMS/HCC)    Alcohol abuse    Acute respiratory failure with hypoxia (CMS/HCC)    Pertussis pneumonia    VANESSA (acute kidney injury)    COVID-19 vaccine series declined    Acute encephalopathy    Pneumonia due to infectious organism    ARDS (adult respiratory distress syndrome)    Hypertensive urgency    Acute type B dissection without mal perfusion--stable  Acute respiratory failure with hypoxia, Pertussis pna, Staph aureus pna--pulm following  VANESSA/ARF with hypernatremia--cr 1.68 (1.5),na 150, renal following  Untreated HTN--on  Cleviprex  Abdominal distention with elevated bladder pressure--gen surgery following  Volume excess r/t antihypertensive drips  Tobacco abuse--cessation d/w pt  ETOH abuse/withdrawal--15 beers/daily  Non-compliance with medications--off BP meds x 5 years    He is stable from type B dissection but has multiple other medical issues at this time.  HD today--d/w Dr. Polk.  Still to unstable to travel to radiology for scans.  Will continue to follow along.  D/w girlfriend at bedside this morning and his father last evening.  Cont supportive care.  Palliative Care cosulted per primary team.    Sandi Marquez, APRN  9/3/2021  08:39 EDT  Addendum  Patient was seen and examined by me, agree with findings above.  Condition is worsening requiring more oxygen and more medication for support.  Unclear the true culprit from pneumonia.  It could be aspiration pneumonitis and systemic inflammatory response syndrome.  Continue patient support.  I agree with palliative care.  Prognosis at this point is poor  Efraín Todd MD

## 2021-09-03 NOTE — PROGRESS NOTES
Cardiology Progress Note      Admiting Physician:  Kayla Carter MD   LOS: 13 days         Cardiology assessment and plan    Type B aortic dissection  Respiratory failure on vent  Abdominal distention  Malignant hypertension currently under multiple medications  History of alcohol abuse  Normal LV systolic function  Persistent hypoxia possible ARDS  Acute on chronic renal failure  CT of the chest and abdomen with a stable dissection from the left subclavian to the SMA true lumen supplies the celiac and SMA arteries  Patient continues to be hypoxic with high PEEP  Hypernatremia    Continue current medical therapy  Labs and medications reviewed  Continue current medical management  Further recommendations based on patient course              Reason For Followup:  Type B aortic dissection      Subjective:    Interval History:  Patient was seen and examined, chart and labs reviewed.  Rhythm sinus at 68, pressure stable.  Still sedated, intubated.  Shiley placed and undergoing dialysis today.  100%/+ 17 PEEP.  Requiring IV clevidipine and labetalol for blood pressure management.    Sodium 162  Creatinine 1.84/BUN 88  WBC 28.7          Review of Systems:  Unable to obtain review of systems    Assessment & Plan    Impressions:  Type B aortic dissection without malperfusion  Malignant hypertension-currently with significant lability  Medically noncompliant  Tobacco abuse  Alcohol abuse    Recommendations:  Continue current medical therapy  Repeat limIted echo performed secondary to acute EKG changes, no significant findings.  EF 56-60%.  Continue to monitor rhythm and pressure closely  Nephrology managing renal aspects-dialysis today  Pulmonary managing ventilator.  Sedation resumed  Further recommendations as patient's course progresses.  Prognosis quite guarded.      Seen and examined patient agree with note written by the nurse practitioner  Objective:    Medication Review:   Scheduled Meds:amLODIPine, 10 mg, Oral,  Q24H  arformoterol, 15 mcg, Nebulization, BID - RT  budesonide, 0.5 mg, Nebulization, BID - RT  cloNIDine, 1 patch, Transdermal, Weekly  famotidine, 20 mg, Nasogastric, Daily  folic acid, 1 mg, Oral, Daily  hydrALAZINE, 100 mg, Nasogastric, Q8H  ipratropium-albuterol, 3 mL, Nebulization, Q4H - RT  iron sucrose, 100 mg, Intravenous, Once  methylPREDNISolone sodium succinate, 40 mg, Intravenous, Q8H  metoclopramide, 5 mg, Intravenous, Q8H  multivitamin, 1 tablet, Oral, Daily  piperacillin-tazobactam, 4.5 g, Intravenous, Q12H  polyethylene glycol, 17 g, Nasogastric, Daily  sildenafil, 20 mg, Oral, Q8H  sodium chloride, 3 mL, Intravenous, Q12H  thiamine, 100 mg, Nasogastric, Daily      Continuous Infusions:Adult Central Clinimix TPN, , Last Rate: 63 mL/hr at 09/02/21 1716  clevidipine, 2-32 mg/hr, Last Rate: 12 mg/hr (09/03/21 1026)  epoprostenol, 50 ng/kg/min (Ideal), Last Rate: 50 ng/kg/min (09/01/21 0923)  fentanyl 10 mcg/mL,  mcg/hr, Last Rate: 300 mcg/hr (09/02/21 2302)  insulin, 1-20 Units/hr, Last Rate: 2 Units/hr (09/03/21 1025)  labetalol, 0.5-2 mg/min, Last Rate: 0.5 mg/min (09/03/21 0742)  midazolam 1 mg/mL 100mL NS, 1-10 mg/hr, Last Rate: Stopped (09/02/21 1400)  Pharmacy to Dose TPN,   Pharmacy to Dose Zosyn,       PRN Meds:.•  acetaminophen  •  albumin human  •  alteplase 2 mg vial + sterile water for injection  •  bisacodyl  •  carboxymethylcellulose sod  •  clevidipine  •  dextrose  •  dextrose  •  dextrose  •  glucagon (human recombinant)  •  hydrALAZINE  •  LORazepam  •  ondansetron **OR** ondansetron  •  Pharmacy to Dose TPN  •  Pharmacy to Dose Zosyn  •  sodium chloride  •  vecuronium    Patient Active Problem List   Diagnosis   • Tobacco abuse   • Hypertension   • Aortic aneurysm and dissection (CMS/HCC)   • Medically noncompliant   • Dissection of aorta (CMS/HCC)   • Alcohol abuse   • Acute respiratory failure with hypoxia (CMS/HCC)   • Pertussis pneumonia   • VANESSA (acute kidney injury)   •  COVID-19 vaccine series declined   • Acute encephalopathy   • Pneumonia due to infectious organism   • ARDS (adult respiratory distress syndrome)   • Hypertensive urgency         Physical Exam:    General: Sedated on ventilator  Head:  Normocephalic, atraumatic, mucous membranes moist, endotracheal tube noted  Eyes:  Conjunctivae/corneas injected  Neck:  Supple,  no bruit  Lungs: Coarse and mechanical  Chest wall:   Heart::  Regular rate and rhythm, S1 and S2 normal, 1/6 holosystolic murmur.  No rub or gallop  Abdomen: Remains distended, bowel sounds hypoactive  Extremities: No cyanosis, clubbing, or edema  Pulses: 2+ and symmetric all extremities  Skin:  No rashes or lesions  Neuro/psych: Sedated on ventilator    Vital Signs:  Vitals:    09/03/21 0600 09/03/21 0617 09/03/21 0620 09/03/21 0800   BP:    128/63   BP Location:       Patient Position:       Pulse: 64 64  66   Resp:  (!) 30 (!) 30 15   Temp:    99.2 °F (37.3 °C)   TempSrc:    Axillary   SpO2: 95% 95%  (!) 89%   Weight:       Height:         Wt Readings from Last 1 Encounters:   09/03/21 108 kg (238 lb 12.1 oz)       Intake/Output Summary (Last 24 hours) at 9/3/2021 1027  Last data filed at 9/3/2021 0600  Gross per 24 hour   Intake 5198 ml   Output 3500 ml   Net 1698 ml         Results Review:     CBC    Results from last 7 days   Lab Units 09/03/21  0303 09/02/21  0301 09/01/21  0523 08/31/21  0538 08/30/21  0325 08/29/21  0518 08/28/21  0244   WBC 10*3/mm3 28.70* 22.20* 22.50* 21.10* 21.50* 20.00* 16.00*   HEMOGLOBIN g/dL 11.1* 10.5* 11.6* 11.7* 11.4* 10.9* 11.7*   PLATELETS 10*3/mm3 154 176 170 169 147 145 143     Cr Clearance Estimated Creatinine Clearance: 60.4 mL/min (A) (by C-G formula based on SCr of 1.84 mg/dL (H)).  Coag       HbA1C   Lab Results   Component Value Date    HGBA1C 6.0 (H) 08/27/2021     Blood Glucose   Glucose   Date/Time Value Ref Range Status   09/03/2021 1022 94 70 - 105 mg/dL Final     Comment:     Serial Number:  [id number]:  [id number]   09/03/2021 0917 88 70 - 105 mg/dL Final     Comment:     Serial Number: [id number]:  [id number]   09/03/2021 0741 88 70 - 105 mg/dL Final     Comment:     Serial Number: [id number]:  [id number]   09/03/2021 0643 97 70 - 105 mg/dL Final     Comment:     Serial Number: [id number]:  [id number]   09/03/2021 0601 110 (H) 70 - 105 mg/dL Final     Comment:     Serial Number: [id number]:  [id number]   09/03/2021 0448 132 (H) 74 - 100 mg/dL Final     Comment:     Serial Number: [id number]:  [id number]   09/03/2021 0358 147 (H) 70 - 105 mg/dL Final     Comment:     Serial Number: [id number]:  [id number]   09/03/2021 0258 153 (H) 70 - 105 mg/dL Final     Comment:     Serial Number: [id number]:  [id number]     Infection   Results from last 7 days   Lab Units 08/31/21  0909 08/30/21  1415 08/30/21  1333 08/29/21  0518   BLOODCX   --  No growth at 3 days No growth at 3 days  --    RESPCX  No growth  --   --   --    PROCALCITONIN ng/mL  --   --   --  1.28*     CMP   Results from last 7 days   Lab Units 09/03/21  0831 09/03/21  0303 09/02/21  0301 09/01/21  0523 08/31/21  0537 08/30/21  1157 08/30/21  0325   SODIUM mmol/L 162* 161* 151* 150* 147* 149* 148*   POTASSIUM mmol/L 4.2 3.8 4.4 4.7 4.5 4.4 4.6   CHLORIDE mmol/L 130* 130* 118* 118* 115* 116* 116*   CO2 mmol/L 22.0 18.0* 21.0* 21.0* 21.0* 22.0 21.0*   BUN mg/dL 88* 92* 101* 89* 81* 83* 85*   CREATININE mg/dL 1.84* 1.80* 1.89* 1.68* 1.84* 1.96* 2.06*   GLUCOSE mg/dL 85 164* 450* 340* 177* 149* 150*   ALBUMIN g/dL 3.00* 3.00* 3.20* 2.50* 2.50* 2.50* 2.50*   BILIRUBIN mg/dL 2.0* 2.3* 1.7* 1.9* 2.6* 2.1* 1.9*   ALK PHOS U/L 110 98 93 99 108 104 106   AST (SGOT) U/L 56* 31 18 18 20 19 14   ALT (SGPT) U/L 74* 47* 35 31 29 27 27     ABG    Results from last 7 days   Lab Units 09/03/21  0448 09/02/21  0359 08/31/21  0609 08/31/21  0358 08/30/21  0329 08/29/21  0310 08/28/21  1614   PH, ARTERIAL pH units 7.348* 7.341*  7.280* 7.320* 7.313* 7.381 7.380   PCO2, ARTERIAL mm Hg 38.8 41.5 48.3* 43.5 43.0 35.2 34.9*   PO2 ART mm Hg 87.7 84.2 51.4* 72.5* 67.4* 75.6* 47.3*   O2 SATURATION ART % 96.2 95.6 81.1* 93.0* 91.3* 94.9 82.4*   BASE EXCESS ART mmol/L -4.0* -3.2* -4.2* -3.6* -4.3* -3.7* -3.9*     UA        YUE  No results found for: POCMETH, POCAMPHET, POCBARBITUR, POCBENZO, POCCOCAINE, POCOPIATES, POCOXYCODO, POCPHENCYC, POCPROPOXY, POCTHC, POCTRICYC  Lysis Labs   Results from last 7 days   Lab Units 09/03/21  0831 09/03/21  0303 09/02/21  0301 09/01/21  0523 08/31/21  0538 08/31/21  0537 08/30/21  1157 08/30/21  0325 08/29/21  0518 08/29/21  0518 08/28/21  0244 08/28/21  0244   HEMOGLOBIN g/dL  --  11.1* 10.5* 11.6* 11.7*  --   --  11.4*  --  10.9*  --  11.7*   PLATELETS 10*3/mm3  --  154 176 170 169  --   --  147  --  145  --  143   CREATININE mg/dL 1.84* 1.80* 1.89* 1.68*  --  1.84* 1.96* 2.06*   < > 2.23*   < > 1.90*    < > = values in this interval not displayed.     Radiology(recent) XR Chest 1 View    Result Date: 9/2/2021  Diffuse bilateral infiltrates persist greater on the left than the right. Aeration is similar to the prior study. Tubes and lines appear properly positioned.  Electronically Signed By-Roel Morin MD On:9/2/2021 10:43 AM This report was finalized on [id number] by  Roel Morin MD.        Results from last 7 days   Lab Units 09/03/21  0303   CK TOTAL U/L 29       Imaging Results (Last 24 Hours)     ** No results found for the last 24 hours. **          Cardiac Studies:  Echo- Results for orders placed during the hospital encounter of 08/21/21    Adult Transthoracic Echo Limited W/ Cont if Necessary Per Protocol    Interpretation Summary  · Saline test results are negative for right to left atrial level shunt.    Stress Myoview-  Cath-        DEVANTE Robbins  09/03/21  10:27 EDT     Electronically signed by DEVANTE Robbins, 09/03/21, 10:27 AM EDT.  .

## 2021-09-03 NOTE — CONSULTS
"Palliative Care Consultation    Patient Name: Geovanny Em  : 1975  MRN: [id number]  Allergies: Patient has no known allergies.    Requesting clinician:  Emma  Reason for consult: Consultation for clarification of goals of care and code status.      Patient Code Status:   Code Status and Medical Interventions:   Ordered at: 21 0816     Code Status:    CPR     Medical Interventions (Level of Support Prior to Arrest):    Full           Chief Complaint:    Back pain    History of Present Illness    Geovanny Em is a 46 y.o. male who presented to [facility] ED on  with reports of back pain and chest pain in the middle of the night that woke him up. He denied any relieving factors. He stated that the pain was a 3-5/10 and he describes it as \"sore\".  He was unable to state if the pain radiates from front to back or radiates to any other areas of his body.  He denied nausea, vomiting, cough, expectoration, abdominal pain, diarrhea, diaphoresis, urinary hesitancy or pain with urination.  He had no aggravating factors for his discomfort.  He reports that he has previously been diagnosed with hypertension however after moving here 5 years ago he did not seek medical evaluation and has not been on prescription medications.  He denies previous cardiac history.  He does state that he is a smoker of 3/4 pack/day of cigarettes and has smoked for 30 years.    In ED: On evaluation in the emergency department the patient had an SBP >200mm/Hg and he was placed on both Cardene drip and nitroglycerin drip.      Chest x-ray revealed enlargement of the aortic notch suggesting an aneurysm.  CT of the chest per PE protocol revealed an acute aortic dissection beginning near the level of the left subclavian artery and propagating inferiorly just past the level of the celiac artery measuring approximately 30 cm in length.      The patient was admitted to the ICU for further evaluation and treatment.The emergency department " physician spoke with Dr. Corrales, vascular surgery, who agreed with ICU admission and aggressive blood pressure control.  Dr. Todd, CV surgery, was also consulted and agreed with the current treatment plan.    At 0400 on 8/22, patient became restless and agitated. He required violent restraints. His mentation declined and he was intubated at bedside. BAL grew pertussis. Patient started on IV antibiotics. ID consulted. Kidney function continued to decline as well. Nephrology was consulted. Plans for dialysis.     He continues to require high oxygen demands. Following cessation of sedation, patient remained minimally responsive. Neurology was consulted. Recommending MRI but patient is not stable enough for transport.     Concern for abdominal compartment syndrome. Again patient, is not safe for transport for scans. Surgery consulted.        VITAL SIGNS:   Temp:  [99.2 °F (37.3 °C)-99.9 °F (37.7 °C)] 99.2 °F (37.3 °C)  Heart Rate:  [63-68] 66  Resp:  [15-30] 15  BP: (128-161)/(60-72) 128/63  Arterial Line BP: (130-164)/(43-66) 130/43  FiO2 (%):  [100 %] 100 %       PMH: alcoholism, HTN    Past Surgical History:   Procedure Laterality Date   • BRONCHOSCOPY N/A 8/23/2021    Procedure: BRONCHOSCOPY AT BEDSIDE;  Surgeon: Rubin Laird MD;  Location: [facility] ENDOSCOPY;  Service: Pulmonary;  Laterality: N/A;  post:broncoalveolar washing   • BRONCHOSCOPY N/A 8/27/2021    Procedure: BRONCHOSCOPY WITH BRONCHOALVEOLAR LAVAGE AT BEDSIDE;  Surgeon: Rubin Laird MD;  Location: [facility] ENDOSCOPY;  Service: Pulmonary;  Laterality: N/A;   • BRONCHOSCOPY N/A 8/31/2021    Procedure: BRONCHOSCOPY AT BEDSIDE WITH WASHING;  Surgeon: Kayla Carter MD;  Location: [facility] ENDOSCOPY;  Service: Pulmonary;  Laterality: N/A;       Family History   Problem Relation Age of Onset   • No Known Problems Mother    • No Known Problems Father        Social History     Tobacco Use   • Smoking status: Current Every Day Smoker     Packs/day: 0.50     Years:  30.00     Pack years: 15.00   • Smokeless tobacco: Never Used   Vaping Use   • Vaping Use: Never used   Substance Use Topics   • Alcohol use: Yes     Alcohol/week: 42.0 standard drinks     Types: 42 Cans of beer per week     Comment: 6 beers a day   • Drug use: Never           LABS:    Results from last 7 days   Lab Units 09/03/21  0303   WBC 10*3/mm3 28.70*   HEMOGLOBIN g/dL 11.1*   HEMATOCRIT % 32.2*   PLATELETS 10*3/mm3 154     Results from last 7 days   Lab Units 09/03/21  0831   SODIUM mmol/L 162*   POTASSIUM mmol/L 4.2   CHLORIDE mmol/L 130*   CO2 mmol/L 22.0   BUN mg/dL 88*   CREATININE mg/dL 1.84*   GLUCOSE mg/dL 85   CALCIUM mg/dL 9.1     Results from last 7 days   Lab Units 09/03/21  0831   SODIUM mmol/L 162*   POTASSIUM mmol/L 4.2   CHLORIDE mmol/L 130*   CO2 mmol/L 22.0   BUN mg/dL 88*   CREATININE mg/dL 1.84*   CALCIUM mg/dL 9.1   BILIRUBIN mg/dL 2.0*   ALK PHOS U/L 110   ALT (SGPT) U/L 74*   AST (SGOT) U/L 56*   GLUCOSE mg/dL 85         IMAGING STUDIES:  XR Chest 1 View    Result Date: 9/2/2021  Diffuse bilateral infiltrates persist greater on the left than the right. Aeration is similar to the prior study. Tubes and lines appear properly positioned.  Electronically Signed By-Roel Morin MD On:9/2/2021 10:43 AM This report was finalized on [id number] by  Roel Morin MD.        I reviewed the patient's new clinical results including labs, imaging, and vitals.        Scheduled Meds:  amLODIPine, 10 mg, Oral, Q24H  arformoterol, 15 mcg, Nebulization, BID - RT  budesonide, 0.5 mg, Nebulization, BID - RT  cloNIDine, 1 patch, Transdermal, Weekly  famotidine, 20 mg, Nasogastric, Daily  folic acid, 1 mg, Oral, Daily  hydrALAZINE, 100 mg, Nasogastric, Q8H  ipratropium-albuterol, 3 mL, Nebulization, Q4H - RT  iron sucrose, 100 mg, Intravenous, Once  methylPREDNISolone sodium succinate, 40 mg, Intravenous, Q8H  metoclopramide, 5 mg, Intravenous, Q8H  multivitamin, 1 tablet, Oral,  Daily  piperacillin-tazobactam, 4.5 g, Intravenous, Q12H  polyethylene glycol, 17 g, Nasogastric, Daily  sildenafil, 20 mg, Oral, Q8H  sodium chloride, 3 mL, Intravenous, Q12H  thiamine, 100 mg, Nasogastric, Daily      Continuous Infusions:  Adult Central Clinimix TPN, , Last Rate: 63 mL/hr at 09/02/21 1716  clevidipine, 2-32 mg/hr, Last Rate: 28 mg/hr (09/03/21 0800)  epoprostenol, 50 ng/kg/min (Ideal), Last Rate: 50 ng/kg/min (09/01/21 0923)  fentanyl 10 mcg/mL,  mcg/hr, Last Rate: 300 mcg/hr (09/02/21 2302)  insulin, 1-20 Units/hr, Last Rate: 4 Units/hr (09/03/21 0918)  labetalol, 0.5-2 mg/min, Last Rate: 0.5 mg/min (09/03/21 0742)  midazolam 1 mg/mL 100mL NS, 1-10 mg/hr, Last Rate: Stopped (09/02/21 1400)  Pharmacy to Dose TPN,   Pharmacy to Dose Zosyn,         I have reviewed patient's current medication list.     Review of Systems   Unable to perform ROS: Intubated         Physical Exam  Vitals and nursing note reviewed.   Constitutional:       Appearance: He is ill-appearing.      Comments: Intubated, unresponsive   HENT:      Head: Normocephalic and atraumatic.      Nose: Nose normal.      Mouth/Throat:      Mouth: Mucous membranes are dry.      Pharynx: Oropharynx is clear.      Comments: ET  Eyes:      Extraocular Movements: Extraocular movements intact.      Conjunctiva/sclera: Conjunctivae normal.      Pupils: Pupils are equal, round, and reactive to light.   Cardiovascular:      Rate and Rhythm: Tachycardia present.      Pulses: Normal pulses.   Pulmonary:      Effort: Respiratory distress present.   Abdominal:      General: Abdomen is flat.   Musculoskeletal:      Cervical back: Normal range of motion.   Skin:     General: Skin is warm and dry.   Neurological:      General: No focal deficit present.      Comments: Intubated and sedated             PROBLEM LIST:    Aortic aneurysm and dissection (CMS/HCC)    Tobacco abuse    Hypertension    Medically noncompliant    Dissection of aorta (CMS/HCC)    " Alcohol abuse    Acute respiratory failure with hypoxia (CMS/[facility])    Pertussis pneumonia    VANESSA (acute kidney injury)    COVID-19 vaccine series declined    Acute encephalopathy    Pneumonia due to infectious organism    ARDS (adult respiratory distress syndrome)    Hypertensive urgency          ASSESSMENT/PLAN:    Abdominal aneurysm: noted at time of admission. Dissection noted but not rupture. CV surgery following. No need for intervention at this time. Recommending strict BP control.     Acute hypoxic respiratory failure: Patient required intubation. Pulmonary is managing. Patient intubated and minimally responsive on ventilator. BAL grew pertussis. ID is managing antibiotics.     Compartment syndrome?: Concern mentioned per nephrology. Unable to get CT scan due to instability. Surgery consulted. NG placed.     Encephalopathy:  Neurology consulted. Recommending MRI but patient is not stable enough for transfer.     Chest pain: Cardiology consulted. Echo showed Left ventricular ejection fraction appears to be 56 - 60%. Left ventricular systolic function is normal..    VANESSA: most likely ATN post IV contrast exposure, and also relative hypotension after severe hypertension.  Initial UA was negative. Started on IV bumex. Plans for dialysis today.     HTN: emergency. Requiring cardene drip.     Alcohol withdrawal: Patient on CIWA protocol.     ADVANCED CARE PLANNIN/3 Met with patient and significant other this afternoon. Patient is intubated and sedated. Significant other at bedside states that the patient has two children that are coming in from [place], and a mother that is involved in his care. We discussed his current clinical status and likely poor outcome. She understands how sick he is. She says that she was told by one of the doctors that the \"next few days are critical.\" and that he \"may improve with dialysis.\" She says she wants to give him more time to see if there are any changes. She tells " me that the two of them never discussed if he was in this clinical condition, what his wishes would be. She feels like he would want to do everything. We talked about code status. She says she knows that it is risky to do CPR with his AAA and that it will likely mean he has an even poorer chance of recovery. She says at this point, she wants to give him more time and have discussion with family before making any changes.       Advanced Directives: Patient does not have advance directive  Health Care Directive on file: No  Health Care Surrogate:      Palliative Performance Scale Score:    Comments:           Decisional Capacity: no  Patient's understanding of illness: unknown  Patient goals of care:  Full code, full intervention      Thank you for this consult and allowing us to participate in patient's plan of care. Palliative Care Team will continue to follow patient.       I spent 59 minutes reviewing medical and medication records, assessing and examining patient, discussing with family, answering questions, providing some guidance about a plan and documentation of care, and coordinating care with other healthcare members. More than 50% of time spent face to face discussing disease education, current clinical status, and medication management.     I spent an additional 29 minutes on advanced care planning, goals of care, and code status discussion.     Tereza Mas, APRN  9/3/2021

## 2021-09-03 NOTE — PROGRESS NOTES
"Nutrition Services    Patient Name: Geovanny Em  YOB: 1975  MRN: [id number]  Admission date: 8/21/2021      PPE Documentation        PPE Worn By Provider RD did not enter room for this encounter   PPE Worn By Patient  -     PROGRESS NOTE      Encounter Information: TPN check on. TPN initiated 8/30 and today makes day #5 of TPN. Briefly off Cleveprex this AM, however now back on at 32 mL/hr (~1536 kcals/day).        Labs (reviewed below): -Hypernatremia->Started on HD, hopeful to help improve   -elevated BUN/Cr  -hyperglycemia->now on insulin drip   -elevated LFTs  -hypermagnesemia        GI Function:  -Last BM documented on 8/26 ->has been NPO, on TPN.        Nutrition Intervention: TPN providing 1320 kcal + 1536 kcals = 2856 kcals (125% of estimated needs, big improvement from yesterday).  Okay at this time given pt was in a caloric deficit d/t acute medical complications resulting in barriers to optimal nutrition. Cleviprex is likely to be weaned off and will monitor clinical course. Discussed with pharmacist Windy.        PO Diet/Supplements: NPO Diet  Adult [facility] TPN   TPN Prescription: Clinimix 5/20 1500 mL volume, hold lipids        Intake/Output:   Intake/Output Summary (Last 24 hours) at 9/3/2021 1529  Last data filed at 9/3/2021 1320  Gross per 24 hour   Intake 5198 ml   Output 5000 ml   Net 198 ml            Height: Height: 177.8 cm (70\")   Weight: Weight: 108 kg (238 lb 12.1 oz) (09/03/21 4102)   BMI: Body mass index is 34.26 kg/m².     Results from last 7 days   Lab Units 09/03/21  0831 09/03/21  0303 09/02/21  0301   SODIUM mmol/L 162* 161* 151*   POTASSIUM mmol/L 4.2 3.8 4.4   CHLORIDE mmol/L 130* 130* 118*   CO2 mmol/L 22.0 18.0* 21.0*   BUN mg/dL 88* 92* 101*   CREATININE mg/dL 1.84* 1.80* 1.89*   CALCIUM mg/dL 9.1 9.2 9.4   BILIRUBIN mg/dL 2.0* 2.3* 1.7*   ALK PHOS U/L 110 98 93   ALT (SGPT) U/L 74* 47* 35   AST (SGOT) U/L 56* 31 18   GLUCOSE mg/dL 85 164* 450* "     Results from last 7 days   Lab Units 09/03/21  0303 09/02/21  0301 09/02/21  0301 09/01/21  0523 09/01/21  0523 08/30/21  1415 08/30/21  1157   MAGNESIUM mg/dL 2.7*  --  2.9*  --  3.1*   < > 3.0*   PHOSPHORUS mg/dL 2.6   < > 4.0   < > 5.0*   < > 5.4*   HEMOGLOBIN g/dL 11.1*   < > 10.5*   < > 11.6*   < >  --    HEMATOCRIT % 32.2*   < > 32.8*   < > 34.9*   < >  --    TRIGLYCERIDES mg/dL  --   --   --   --   --   --  157*    < > = values in this interval not displayed.     SARS-CoV-2, DISHA   Date Value Ref Range Status   08/31/2021 Not Detected Not Detected Final     Comment:     This nucleic acid amplification test was developed and its performance  characteristics determined by [employer]. Nucleic acid  amplification tests include RT-PCR and TMA. This test has not been  FDA cleared or approved. This test has been authorized by FDA under  an Emergency Use Authorization (EUA). This test is only authorized  for the duration of time the declaration that circumstances exist  justifying the authorization of the emergency use of in vitro  diagnostic tests for detection of SARS-CoV-2 virus and/or diagnosis  of COVID-19 infection under section 564(b)(1) of the Act, 21 U.S.C.  360bbb-3(b) (1), unless the authorization is terminated or revoked  sooner.  When diagnostic testing is negative, the possibility of a false  negative result should be considered in the context of a patient's  recent exposures and the presence of clinical signs and symptoms  consistent with COVID-19. An individual without symptoms of COVID-19  and who is not shedding SARS-CoV-2 virus would expect to have a  negative (not detected) result in this assay.     Lab Results   Component Value Date    HGBA1C 6.0 (H) 08/27/2021       Vitals:    09/03/21 1422   BP:    Pulse:    Resp: (!) 30   Temp:    SpO2:        RD to follow up per protocol.    Electronically signed by:  Jenn Damon RD  09/03/21 15:29 EDT

## 2021-09-03 NOTE — PROGRESS NOTES
PULMONARY/CRITICAL CARE PROGRESS NOTE       NAME:     Geovanny Em   AGE:     46 y.o.   SEX:  male   : 1975       MRN:       [id number]          RM:  MALENA CVCU      ASSESSMENT     F/U: AAA dissection, acute respiratory failure    Principal Problem:    Aortic aneurysm and dissection (CMS/HCC)  Active Problems:    Acute respiratory failure with hypoxia (CMS/HCC)    Pertussis pneumonia    ARDS (adult respiratory distress syndrome)    Hypertensive urgency    Hypertension    Medically noncompliant    VANESSA (acute kidney injury)    Acute encephalopathy    Alcohol abuse    Tobacco abuse    Dissection of aorta (CMS/HCC)    COVID-19 vaccine series declined    Pneumonia due to infectious organism    MULTIDISCIPLINARY ROUNDING     -CXR after HD, possible Bronchoscopy  -Hemodialysis catheter placed on , unfortunately dialysis was unable to be completed due to dialysis staffing issues  -ID adjusted antibiotics  -Cleviprex off, likely requiring after HD.  -Planned removal of 3 LPM  -Palliative care consulted.  -Intubated and sedated.  -Unable to travel on travel vent, patient's oxygen saturation will not stabilize, unable to CT scans  -As needed paralytic based on oxygenation needs  -echo with bubble study negative, no evidence of pulmonary shunting.  -Starting on high dose steroids, continue to wean as tolerated.  -Blood pressure target: SBP <140  -Hemodynamics per CTS, Cleviprex currently off while on dialysis.  -Does not tolerate beta-blockers due to severe bradycardia.  -Retesting for COVID-19 with BAL, keep in enhanced droplet/contact precautions, test-negative, removed from Covid isolation.  -Continues on TPN, would like a CT Abd prior to initiation of tube feeding.  -Monitor fluid volume status: Net IO Since Admission: 16,157.41 mL [21 1153]  -Diuresis per nephrology.  -ARDS, likely complicated by volume overload; not diuresing well, planned hemodialysis  -Poor prognosis, discussed with patient's  significant other at bedside.    PLAN     Aortic aneurysm and dissection, without rupture  -CT Chest, Abdomen, & Pelvis: acute aortic dissection beginning near the level of the left subclavian artery and propagating inferiorly just past the level of the celiac artery as above. It measures approximately 30 cm in length.  -CTS consulted and managing  -Strict BP management, targeting SBP < 140  -Currently off vasoactive drips  -Doesn't tolerate beta blockers due to bradycardia.    Acute respiratory failure with hypoxia  ARDS (Acute respiratory distress syndrome)  Pertussis pneumonia  MSSA pneumonia  -Intubated 8/22 secondary to acute alcohol withdrawal  -Refractory hypoxia requiring high ventilator settings and nebulized Flolan  -Respiratory panel reviewed: Positive for Bordetella pertussis PCR  -Repeat COVID-19 swab negative  -Bronchoscopy completed on 8/23/2021, follow BAL finalized with normal respiratory irwin  -Ventilator management, wean FiO2 as tolerated  -Titrate to oxygen saturation of >92%  -Pulmicort, Duonebs scheduled and as needed.  -Sputum culture on 8/25/2021, preliminary result with 2+ Staph aureus, follow culture results  -Has received multiple antibiotics this admission  -ID consulted for further antibiotic management, now on Zosyn  -Requiring inhaled Flolan, high PEEP and high oxygen supplementation.  -Bronchoscopy 8/27, follow BAL: MSSA pneumonia, resistant only to Penicillin G  -Diurese as tolerated.  -Starting on high-dose steroids (8/31), wean pending clinical response, planned weaning to start on 9/2.  -9/2--weaning Flolan.    Suspected Pulmonary hypertension  -Revatio initiated on 8/20    Hypertensive Urgency  Essential hypertension, chronic  -Aggressive blood pressure management per CTS blood pressure parameters  -Currently on multiple medications per feeding tube  -Current regimen: Amlodipine, Coreg, hydralazine and clonidine patch  -Echo reviewed: EF 56-60%  -Avoiding Cardene due to risk of  pulmonary shunting, but patient does not tolerate BB's.  -Per CTS, starting on Cleviprex (8/31)    Abdominal distention with increased bladder pressure  -Bladder pressure 22  -General surgery consulted  -Unsuccessful attempt to obtain abdominal CT due to acute desaturation and bradycardia  -Ultrasound abdomen reviewed, scant ascites     Acute encephalopathy  -CT reviewed: Patchy hypodensities in the right frontoparietal junction in the right parietal lobe which were nonspecific and could reflect changes of age indeterminate infarcts or chronic microvascular disease.  Recommended MRI brain stroke protocol.  Also revealed chronic appearing pontine  -Ammonia not elevated  -Consulted neurology  -Unable to obtain MRI due to high O2 needs    Medically noncompliant  -Recommend compliance with prescribed regimen    VANESSA (acute kidney injury)  -Creatinine on admission 0.98, increased to 2.6 prompting nephrology consultation  -Nephrology consulted and managing.  -Avoid NSAIDs, nephrotoxic medications, and hypotension.  -Nontunneled hemodialysis catheter placed on 9/2/2021 for planned hemodialysis; delayed by hemodialysis center  -Currently undergoing dialysis with planned removal of 3 L    Alcohol abuse  -History of drinking 12-15 beers daily  -Monitor for signs and symptoms of alcohol withdrawal  -[facility] protocol  -Thiamine, folic acid daily  -Counseled on abstinence of alcohol or substances prior to intubation.    Tobacco abuse  -Counseled on smoking cessation prior to intubation    Clinical features of DEMETRICE  -Recommend outpatient follow up in pulmonary/sleep clinic for sleep study    Code Status: DNR (CODE STATUS was changed by family after meeting with palliative care)  VTE Prophylaxis: SCDs  PUD Prophylaxis: Pepcid  Nutrition: OG tube with tube feedings     Overall a poor prognosis with current  AAA, nursing to discuss with CTS about calling and discussing patient's prognosis involving  AAA specifically.        [place] &  "SUBJECTIVE     The patient is a 46-year-old male who presented to the emergency department today for evaluation of low back pain and chest pain.  He is a fairly poor historian however he is able to state that he woke up with the pain this morning and has had no relieving factors.  He states that the pain was a 3-5/10 and he describes it as \"sore\".  He is unable to state if the pain radiates from front to back or radiates to any other areas of his body.  He denies that he has experienced nausea, vomiting, cough, expectoration, abdominal pain, diarrhea, diaphoresis, urinary hesitancy or pain with urination.  He had no aggravating factors for his discomfort.  He reports that he has previously been diagnosed with hypertension however after moving here 5 years ago he did not seek medical evaluation and has not been on prescription medications.  He denies previous cardiac history.  He does state that he is a smoker of 3/4 pack/day of cigarettes and has smoked for 30 years.     On evaluation in the emergency department the patient had an SBP >200mm/Hg and he was placed on both Cardene drip and nitroglycerin drip.  Chest x-ray revealed enlargement of the aortic notch suggesting an aneurysm.  CT of the chest per PE protocol revealed an acute aortic dissection beginning near the level of the left subclavian artery and propagating inferiorly just past the level of the celiac artery measuring approximately 30 cm in length.  The patient was admitted to the ICU for further evaluation and treatment.The emergency department physician spoke with Dr. Corrales, vascular surgery, who agreed with ICU admission and aggressive blood pressure control.  Dr. Todd, CV surgery, was also consulted and agreed with the current treatment plan.    DAILY UPDATES:  8/22: intubated and sedated  8/23: Intubated and sedated.  Refractory hypoxia  8/24: Intubated, sedated  8/25: Intubated, not following commands, mildly sedated  8/26: Intubated, sedated, " not following commands.  8/27: Intubated, sedated, not following commands.  8/28: Intubated and sedated.  Continues to have high O2 needs  8/29: Intubated and sedated.  Inhaled Flolan.  Continues to have high O2 needs  8/30: No acute events.  Intubated and sedated.  Continues to receive inhaled Flolan  8/31: Issues with elevated BP overnight, Cardene restarted.  Family concerned about previous discussions involving possible worsening hypoxia, benefit > risk, continued, but now planned change to Cleviprex.   9/1: On Cleviprex, weaning Flolan.  Intubated with FiO2 100%, PEEP +17.  Intubated, sedated.  9/2: Intubated, sedated, on Flolan.  Currently on hemodialysis.    REVIEW OF SYSTEMS:  Review of systems could not be obtained due to   patient sedation status. patient intubated.    MEDICATIONS     SCHEDULED MEDICATIONS:   amLODIPine, 10 mg, Oral, Q24H  arformoterol, 15 mcg, Nebulization, BID - RT  budesonide, 0.5 mg, Nebulization, BID - RT  cloNIDine, 1 patch, Transdermal, Weekly  famotidine, 20 mg, Nasogastric, Daily  folic acid, 1 mg, Oral, Daily  hydrALAZINE, 100 mg, Nasogastric, Q8H  ipratropium-albuterol, 3 mL, Nebulization, Q4H - RT  iron sucrose, 100 mg, Intravenous, Once  methylPREDNISolone sodium succinate, 40 mg, Intravenous, Q8H  metoclopramide, 5 mg, Intravenous, Q8H  multivitamin, 1 tablet, Oral, Daily  piperacillin-tazobactam, 4.5 g, Intravenous, Q12H  polyethylene glycol, 17 g, Nasogastric, Daily  sildenafil, 20 mg, Oral, Q8H  sodium chloride, 3 mL, Intravenous, Q12H  thiamine, 100 mg, Nasogastric, Daily        CONTINUOUS INFUSIONS:   Adult Central Clinimix TPN, , Last Rate: 63 mL/hr at 09/02/21 1716  clevidipine, 2-32 mg/hr, Last Rate: 2 mg/hr (09/03/21 1057)  epoprostenol, 50 ng/kg/min (Ideal), Last Rate: 50 ng/kg/min (09/01/21 0923)  fentanyl 10 mcg/mL,  mcg/hr, Last Rate: 300 mcg/hr (09/02/21 2302)  insulin, 1-20 Units/hr, Last Rate: 2 Units/hr (09/03/21 1025)  labetalol, 0.5-2 mg/min, Last  "Rate: 1 mg/min (09/03/21 1129)  midazolam 1 mg/mL 100mL NS, 1-10 mg/hr, Last Rate: Stopped (09/02/21 1400)  Pharmacy to Dose TPN,   Pharmacy to Dose Zosyn,         PRN MEDS:  •  acetaminophen  •  albumin human  •  alteplase 2 mg vial + sterile water for injection  •  bisacodyl  •  carboxymethylcellulose sod  •  clevidipine  •  dextrose  •  dextrose  •  dextrose  •  glucagon (human recombinant)  •  hydrALAZINE  •  LORazepam  •  ondansetron **OR** ondansetron  •  Pharmacy to Dose TPN  •  Pharmacy to Dose Zosyn  •  sodium chloride  •  vecuronium    OBJECTIVE     VITAL SIGNS:  /63   Pulse 62   Temp 99.2 °F (37.3 °C) (Axillary)   Resp (!) 30   Ht 177.8 cm (70\")   Wt 108 kg (238 lb 12.1 oz)   SpO2 92%   BMI 34.26 kg/m²     I/O FROM PREVIOUS 3 SHIFTS:  I/O last 3 completed shifts:  In: 7303 [I.V.:5795]  Out: 6200 [Urine:6200]    NET BALANCE SINCE ADMISSION:  Net IO Since Admission: 16,157.41 mL [09/03/21 1153]     I/O PREVIOUS 24 HOURS:   Intake/Output                       09/01/21 0701 - 09/02/21 0700 09/02/21 0701 - 09/03/21 0700     8478-2500 6958-8510 Total 4642-0942 4622-3473 Total                 Intake    P.O.  --  0 0  --  -- --    I.V.  850  2105 2955  --  3690 3690    TPN  --  -- --  --  1508 1508    Total Intake 850 2105 2955 -- 5198 5198       Output    Urine  1000  2700 3700  1500  2000 3500    Total Output 1000 2700 3700 1500 2000 3500           PHYSICAL EXAM:  Constitutional:  Well developed, well nourished, no acute distress, intubated and sedated   Eyes:  PERRL, conjunctiva injected, EOM not assessed  HENT:  Atraumatic, external ears normal, nose normal. OETT. Neck-normal range of motion, no JVD, supple, trachea midline.  Right IJ central line, left IJ nontunneled hemodialysis catheter  Respiratory: Coarse breath sounds, diminished bibasilar breath sound, scattered rhonchi without wheezes, non-labored respirations without accessory muscle use  Cardiovascular:  Normal rate normal rhythm, " S1-S2, no murmurs, no gallops, no rubs   GI:  firm and distended, active bowel sounds. Morbid body habitus  : Deferred, Galvan catheter in place.  Musculoskeletal:   Generalized edema, no clubbing, no deformities  Integument:  Well hydrated, no rash   Neurologic:  intubated and sedated, no focal deficits noted  Psychiatric:   Intubated, sedated         RESULTS     LABS:  Lab Results (last 24 hours)     Procedure Component Value Units Date/Time    POC Glucose Once [[id number]]  (Normal) Collected: 09/03/21 1022    Specimen: Blood Updated: 09/03/21 1023     Glucose 94 mg/dL      Comment: Serial Number: [id number]:  [id number]       POC Glucose Once [[id number]]  (Normal) Collected: 09/03/21 0917    Specimen: Blood Updated: 09/03/21 0918     Glucose 88 mg/dL      Comment: Serial Number: [id number]:  [id number]       Comprehensive Metabolic Panel [[id number]]  (Abnormal) Collected: 09/03/21 0831    Specimen: Blood Updated: 09/03/21 0902     Glucose 85 mg/dL      BUN 88 mg/dL      Creatinine 1.84 mg/dL      Sodium 162 mmol/L      Potassium 4.2 mmol/L      Comment: Slight hemolysis detected by analyzer. Results may be affected.        Chloride 130 mmol/L      CO2 22.0 mmol/L      Calcium 9.1 mg/dL      Total Protein 5.6 g/dL      Albumin 3.00 g/dL      ALT (SGPT) 74 U/L      AST (SGOT) 56 U/L      Comment: Slight hemolysis detected by analyzer. Results may be affected.        Alkaline Phosphatase 110 U/L      Total Bilirubin 2.0 mg/dL      eGFR Non African Amer 39 mL/min/1.73      Globulin 2.6 gm/dL      A/G Ratio 1.2 g/dL      BUN/Creatinine Ratio 47.8     Anion Gap 10.0 mmol/L     Narrative:      GFR Normal >60  Chronic Kidney Disease <60  Kidney Failure <15      POC Glucose Once [[id number]]  (Normal) Collected: 09/03/21 0741    Specimen: Blood Updated: 09/03/21 0742     Glucose 88 mg/dL      Comment: Serial Number: [id number]:  [id number]       Histoplasma Antigen, CSF or BAL - Wash, Bronchus  "[[id number]] Collected: 08/31/21 0909    Specimen: Wash from Bronchus Updated: 09/03/21 0713     Result None Detected ng/mL      Comment:                                           None Detected        Interpretation Negative     Comment: Positive results reported in ng/mL from 0.20 ng/mL to 20.00 ng/mL  Positive results above 20.00 ng/mL are reported as \"Above the  Limit of Quantification\"       Narrative:      Performed at:  [address] [place], [place] [address]ta [address] [place]  [id number]  : Al Heredia MD, Phone:  [phone]    POC Glucose Once [[id number]]  (Normal) Collected: 09/03/21 0643    Specimen: Blood Updated: 09/03/21 0644     Glucose 97 mg/dL      Comment: Serial Number: [id number]:  [id number]       POC Glucose Once [[id number]]  (Abnormal) Collected: 09/03/21 0601    Specimen: Blood Updated: 09/03/21 0603     Glucose 110 mg/dL      Comment: Serial Number: [id number]:  [id number]       POC Glucose Once [[id number]]  (Abnormal) Collected: 09/03/21 0448    Specimen: Blood Updated: 09/03/21 0451     Glucose 132 mg/dL      Comment: Serial Number: [id number]:  [id number]       Blood Gas, Arterial - [[id number]]  (Abnormal) Collected: 09/03/21 0448    Specimen: Arterial Blood Updated: 09/03/21 0451     Site Arterial Line     Asher's Test Positive     pH, Arterial 7.348 pH units      pCO2, Arterial 38.8 mm Hg      pO2, Arterial 87.7 mm Hg      HCO3, Arterial 21.3 mmol/L      Base Excess, Arterial -4.0 mmol/L      Comment: Serial Number: [id number]:  [id number]        O2 Saturation, Arterial 96.2 %      CO2 Content 22.5 mmol/L      Barometric Pressure for Blood Gas --     Comment: N/A        Modality Adult Vent     FIO2 100 %      Ventilator Mode ;PC     PEEP 17     Hemodilution No     Respiratory Rate 30    Comprehensive Metabolic Panel [[id number]]  (Abnormal) Collected: 09/03/21 0303    Specimen: Blood Updated: 09/03/21 0419     Glucose 164 mg/dL      BUN 92 mg/dL      " Creatinine 1.80 mg/dL      Sodium 161 mmol/L      Potassium 3.8 mmol/L      Comment: Slight hemolysis detected by analyzer. Results may be affected.        Chloride 130 mmol/L      CO2 18.0 mmol/L      Calcium 9.2 mg/dL      Total Protein 5.7 g/dL      Albumin 3.00 g/dL      ALT (SGPT) 47 U/L      AST (SGOT) 31 U/L      Comment: Slight hemolysis detected by analyzer. Results may be affected.        Alkaline Phosphatase 98 U/L      Total Bilirubin 2.3 mg/dL      eGFR Non African Amer 40 mL/min/1.73      Globulin 2.7 gm/dL      A/G Ratio 1.1 g/dL      BUN/Creatinine Ratio 51.1     Anion Gap 13.0 mmol/L     Narrative:      GFR Normal >60  Chronic Kidney Disease <60  Kidney Failure <15      Phosphorus [[id number]]  (Normal) Collected: 09/03/21 0303    Specimen: Blood Updated: 09/03/21 0418     Phosphorus 2.6 mg/dL     Manual Differential [[id number]]  (Abnormal) Collected: 09/03/21 0303    Specimen: Blood Updated: 09/03/21 0416     Neutrophil % 85.0 %      Lymphocyte % 3.0 %      Monocyte % 4.0 %      Bands %  6.0 %      Metamyelocyte % 1.0 %      Myelocyte % 1.0 %      Neutrophils Absolute 26.12 10*3/mm3      Lymphocytes Absolute 0.86 10*3/mm3      Monocytes Absolute 1.15 10*3/mm3      Poikilocytes Slight/1+     WBC Morphology Normal     Platelet Morphology Normal    CBC & Differential [[id number]]  (Abnormal) Collected: 09/03/21 0303    Specimen: Blood Updated: 09/03/21 0416    Narrative:      The following orders were created for panel order CBC & Differential.  Procedure                               Abnormality         Status                     ---------                               -----------         ------                     CBC Auto Differential[[id number]]        Abnormal            Final result               Scan Slide[[id number]]                                       Final result                 Please view results for these tests on the individual orders.    Scan Slide [[id number]] Collected: 09/03/21 0303     Specimen: Blood Updated: 09/03/21 0416     Scan Slide --     Comment: See Manual Differential Results       CBC Auto Differential [[id number]]  (Abnormal) Collected: 09/03/21 0303    Specimen: Blood Updated: 09/03/21 0416     WBC 28.70 10*3/mm3      RBC 3.28 10*6/mm3      Hemoglobin 11.1 g/dL      Hematocrit 32.2 %      MCV 98.2 fL      MCH 33.7 pg      Comment: Result checked         MCHC 34.3 g/dL      Comment: Result checked         RDW 14.9 %      RDW-SD 51.6 fl      MPV 10.0 fL      Platelets 154 10*3/mm3     Narrative:      The previously reported component NRBC is no longer being reported. Previous result was 0.0 /100 WBC (Reference Range: 0.0-0.2 /100 WBC) on 9/3/2021 at 0330 EDT.    POC Glucose Once [[id number]]  (Abnormal) Collected: 09/03/21 0358    Specimen: Blood Updated: 09/03/21 0359     Glucose 147 mg/dL      Comment: Serial Number: [id number]:  [id number]       Lactic Acid, Plasma [[id number]]  (Normal) Collected: 09/03/21 0303    Specimen: Blood Updated: 09/03/21 0351     Lactate 2.0 mmol/L     Magnesium [[id number]]  (Abnormal) Collected: 09/03/21 0303    Specimen: Blood Updated: 09/03/21 0350     Magnesium 2.7 mg/dL     CK [[id number]]  (Normal) Collected: 09/03/21 0303    Specimen: Blood Updated: 09/03/21 0350     Creatine Kinase 29 U/L     Calcium, Ionized [[id number]]  (Abnormal) Collected: 09/03/21 0303    Specimen: Blood Updated: 09/03/21 0323     Ionized Calcium 1.44 mmol/L     POC Glucose Once [[id number]]  (Abnormal) Collected: 09/03/21 0258    Specimen: Blood Updated: 09/03/21 0301     Glucose 153 mg/dL      Comment: Serial Number: [id number]:  [id number]       POC Glucose Once [[id number]]  (Abnormal) Collected: 09/03/21 0204    Specimen: Blood Updated: 09/03/21 0205     Glucose 166 mg/dL      Comment: Serial Number: [id number]:  [id number]       POC Glucose Once [[id number]]  (Abnormal) Collected: 09/03/21 0109    Specimen: Blood Updated: 09/03/21 0113     Glucose 195 mg/dL       Comment: Serial Number: [id number]:  [id number]       POC Glucose Once [[id number]]  (Abnormal) Collected: 09/03/21 0001    Specimen: Blood Updated: 09/03/21 0002     Glucose 205 mg/dL      Comment: Serial Number: [id number]:  [id number]       POC Glucose Once [[id number]]  (Abnormal) Collected: 09/02/21 2300    Specimen: Blood Updated: 09/02/21 2302     Glucose 231 mg/dL      Comment: Serial Number: [id number]:  [id number]       POC Glucose Once [[id number]]  (Abnormal) Collected: 09/02/21 2131    Specimen: Blood Updated: 09/02/21 2133     Glucose 260 mg/dL      Comment: Serial Number: [id number]:  [id number]       POC Glucose Once [[id number]]  (Abnormal) Collected: 09/02/21 1940    Specimen: Blood Updated: 09/02/21 1951     Glucose 293 mg/dL      Comment: Serial Number: [id number]:  [id number]       POC Glucose Once [[id number]]  (Abnormal) Collected: 09/02/21 1657    Specimen: Blood Updated: 09/02/21 1658     Glucose 355 mg/dL      Comment: Serial Number: [id number]:  [id number]       POC Glucose Once [[id number]]  (Abnormal) Collected: 09/02/21 1607    Specimen: Blood Updated: 09/02/21 1608     Glucose 302 mg/dL      Comment: Serial Number: [id number]:  [id number]              RADIOLOGY:  No Radiology Exams Resulted Within Past 24 Hours    ECHOCARDIOGRAM:  Results for orders placed during the hospital encounter of 08/21/21    Adult Transthoracic Echo Limited W/ Cont if Necessary Per Protocol    Interpretation Summary  · Saline test results are negative for right to left atrial level shunt.       I reviewed the patient's new clinical results.    This note has been scribed by me for pulmonary attending physician.    Electronically signed by DEVANTE Mancia, 09/03/21 at 11:53 EDT.       I personally have examined  and interviewed the patient. I have reviewed the history, data, problems, assessment and plan with our NP.  Critical care time in direct medical management (    ) minutes  Electronically signed by Kayla Carter MD, D,ABS, 09/04/21,.

## 2021-09-03 NOTE — PLAN OF CARE
Problem: Adult Inpatient Plan of Care  Goal: Plan of Care Review  Outcome: Ongoing, Progressing  Goal: Patient-Specific Goal (Individualized)  Outcome: Ongoing, Progressing  Goal: Absence of Hospital-Acquired Illness or Injury  Outcome: Ongoing, Progressing  Intervention: Identify and Manage Fall Risk  Recent Flowsheet Documentation  Taken 9/3/2021 1700 by Glenys Paredes RN  Safety Promotion/Fall Prevention: safety round/check completed  Taken 9/3/2021 1610 by Glenys Paredes RN  Safety Promotion/Fall Prevention: safety round/check completed  Taken 9/3/2021 1600 by Glenys Paredes RN  Safety Promotion/Fall Prevention: safety round/check completed  Taken 9/3/2021 1500 by Glenys Paredes RN  Safety Promotion/Fall Prevention: safety round/check completed  Taken 9/3/2021 1300 by Glenys Paredes RN  Safety Promotion/Fall Prevention: safety round/check completed  Taken 9/3/2021 1200 by Glenys Paredes RN  Safety Promotion/Fall Prevention: safety round/check completed  Taken 9/3/2021 1100 by Glenys Paredes RN  Safety Promotion/Fall Prevention: safety round/check completed  Taken 9/3/2021 0900 by Glenys Paredes RN  Safety Promotion/Fall Prevention: safety round/check completed  Taken 9/3/2021 0800 by Glenys Paredes RN  Safety Promotion/Fall Prevention:   safety round/check completed   fall prevention program maintained  Taken 9/3/2021 0700 by Glenys Paredes RN  Safety Promotion/Fall Prevention: safety round/check completed  Intervention: Prevent Skin Injury  Recent Flowsheet Documentation  Taken 9/3/2021 0800 by Glenys Paredes RN  Skin Protection:   pulse oximeter probe site changed   silicone foam dressing in place   skin-to-device areas padded   skin-to-skin areas padded  Intervention: Prevent and Manage VTE (venous thromboembolism) Risk  Recent Flowsheet Documentation  Taken 9/3/2021 0800 by Glenys Paredes RN  VTE Prevention/Management:   bilateral   sequential compression devices on  Intervention:  Prevent Infection  Recent Flowsheet Documentation  Taken 9/3/2021 0800 by Glenys Paredes RN  Infection Prevention:   environmental surveillance performed   equipment surfaces disinfected   hand hygiene promoted   personal protective equipment utilized   rest/sleep promoted   visitors restricted/screened   single patient room provided  Goal: Optimal Comfort and Wellbeing  Outcome: Ongoing, Progressing  Intervention: Provide Person-Centered Care  Recent Flowsheet Documentation  Taken 9/3/2021 0800 by Glenys Paredes RN  Trust Relationship/Rapport:   care explained   choices provided   emotional support provided  Goal: Readiness for Transition of Care  Outcome: Ongoing, Progressing   Goal Outcome Evaluation:      HD treatment today. 3L taken off. Pt O2 saturation improved throughout dialysis. No gag reflex, no following commands, pt does not track with his eyes. Cough reflex intact. Labetalol gtt was infusing and pt became bradycardic, Cleviprex restarted for htn. Pt remains bradycardic and on the ventilator at 100% and 17 peep. Did not tolerate transport vent to go to CT. Family made patient DNR today.

## 2021-09-04 NOTE — PROGRESS NOTES
"Nutrition Services    Patient Name: Geovanny Em  YOB: 1975  MRN: [id number]  Admission date: 8/21/2021      PPE Documentation        PPE Worn By Provider RD did not enter room for this encounter   PPE Worn By Patient  -     PROGRESS NOTE      Encounter Information: 9/4: Progress note to check on TPN. Today 9/4 is day #6 of TPN this admission. Continues on Cleveprex at 30mL/hour (~1440 kcals/day). This is a reduction from yesterday; hopefully can wean this medication further. Will follow closely and adjust TPN tomorrow if Cleveprex remains at high rate. Discussed with pharmacist Windy.        Labs (reviewed below): -Hypernatremia-> ongoing, slightly improved   -elevated BUN/Cr - ongoing, receiving HD   -hyperglycemia->now on insulin drip   -elevated ALT (ALT and Alk Phos now WNL)  -hyperphosphatemia - pharmacist to adjust lytes; continues to receive dialysis/nephrology following        GI Function:  Last BM documented on 8/26 ->has been NPO, on TPN.        Nutrition Intervention: TPN providing 1320 kcal + 1440 kcals = 2760 kcals (120% of estimated needs - improving). This does not represent a need for immediate modification to parenteral regimen, as pt was previously in energy deficit. Cleviprex likely to wean further - will continue to follow closely and collaborate with pharmacist.       PO Diet/Supplements: NPO Diet   TPN Prescription: Clinimix 5/20 1500 mL volume, hold lipids        Intake/Output:   Intake/Output Summary (Last 24 hours) at 9/4/2021 0926  Last data filed at 9/4/2021 0634  Gross per 24 hour   Intake 2392 ml   Output 3770 ml   Net -1378 ml            Height: Height: 177.8 cm (70\")   Weight: Weight: 108 kg (238 lb 12.1 oz) (09/03/21 0063)   BMI: Body mass index is 34.26 kg/m².     Results from last 7 days   Lab Units 09/04/21  0359 09/03/21  1800 09/03/21  0831   SODIUM mmol/L 146* 149* 162*   POTASSIUM mmol/L 4.9 4.1 4.2   CHLORIDE mmol/L 113* 114* 130*   CO2 mmol/L 20.0* 21.0* " 22.0   BUN mg/dL 91* 70* 88*   CREATININE mg/dL 2.30* 1.93* 1.84*   CALCIUM mg/dL 8.3* 8.4* 9.1   BILIRUBIN mg/dL 1.7* 2.5* 2.0*   ALK PHOS U/L 101 116 110   ALT (SGPT) U/L 81* 92* 74*   AST (SGOT) U/L 29 55* 56*   GLUCOSE mg/dL 222* 216* 85     Results from last 7 days   Lab Units 09/04/21  0359 09/03/21  0303 09/03/21  0303 09/02/21  0301 09/02/21  0301 08/30/21  1415 08/30/21  1157   MAGNESIUM mg/dL 2.3  --  2.7*  --  2.9*   < > 3.0*   PHOSPHORUS mg/dL 6.0*   < > 2.6   < > 4.0   < > 5.4*   HEMOGLOBIN g/dL 10.5*   < > 11.1*   < > 10.5*   < >  --    HEMATOCRIT % 31.5*   < > 32.2*   < > 32.8*   < >  --    TRIGLYCERIDES mg/dL  --   --   --   --   --   --  157*    < > = values in this interval not displayed.     SARS-CoV-2, DISHA   Date Value Ref Range Status   08/31/2021 Not Detected Not Detected Final     Comment:     This nucleic acid amplification test was developed and its performance  characteristics determined by [facility]. Nucleic acid  amplification tests include RT-PCR and TMA. This test has not been  FDA cleared or approved. This test has been authorized by FDA under  an Emergency Use Authorization (EUA). This test is only authorized  for the duration of time the declaration that circumstances exist  justifying the authorization of the emergency use of in vitro  diagnostic tests for detection of SARS-CoV-2 virus and/or diagnosis  of COVID-19 infection under section 564(b)(1) of the Act, 21 U.S.C.  360bbb-3(b) (1), unless the authorization is terminated or revoked  sooner.  When diagnostic testing is negative, the possibility of a false  negative result should be considered in the context of a patient's  recent exposures and the presence of clinical signs and symptoms  consistent with COVID-19. An individual without symptoms of COVID-19  and who is not shedding SARS-CoV-2 virus would expect to have a  negative (not detected) result in this assay.     Lab Results   Component Value Date    HGBA1C 6.0  (H) 08/27/2021       Vitals:    09/04/21 0744   BP: 120/55   Pulse: 67   Resp: 14   Temp: 99.7 °F (37.6 °C)   SpO2: (!) 89%       RD to follow up per protocol.    Electronically signed by:  Caroline Wiley RD  09/04/21 09:26 EDT

## 2021-09-04 NOTE — PROGRESS NOTES
Infectious Diseases Progress Note      LOS: 14 days   Patient Care Team:  Provider, No Known as PCP - Ranulfo Marinelli MD as Consulting Physician (Nephrology)    Chief Complaint: Intubated on the ventilator    Subjective     The patient had no high-grade fever during the last 24 hours.  The patient remained intubated on the ventilator 100% FiO2.  The patient is currently off vasopressors    Review of Systems:   Review of Systems   Unable to perform ROS: Intubated        Objective     Vital Signs  Temp:  [98.8 °F (37.1 °C)-100.3 °F (37.9 °C)] 99.7 °F (37.6 °C)  Heart Rate:  [53-67] 62  Resp:  [11-30] 30  BP: (110-139)/(55-75) 120/55  Arterial Line BP: (112-161)/(47-70) 143/55  FiO2 (%):  [100 %] 100 %    Physical Exam:  Physical Exam  Constitutional:       Comments: Intubated and sedated on the ventilator   HENT:      Head: Normocephalic and atraumatic.      Mouth/Throat:      Mouth: Mucous membranes are moist.   Eyes:      Pupils: Pupils are equal, round, and reactive to light.   Cardiovascular:      Rate and Rhythm: Normal rate and regular rhythm.   Pulmonary:      Breath sounds: Rales present.   Abdominal:      General: There is distension.      Comments: Less distention abdomen today examination   Musculoskeletal:      Cervical back: Neck supple.      Right lower leg: Edema present.      Left lower leg: Edema present.   Neurological:      Comments: Intubated and sedated          Results Review:    I have reviewed all clinical data, test, lab, and imaging results.     Radiology  XR Chest 1 View    Result Date: 9/4/2021  DATE OF EXAM: 9/4/2021 6:12 AM  PROCEDURE: XR CHEST 1 VW-  INDICATIONS: Shortness of breath. Aortic dissection.  COMPARISON: Chest radiograph 09/03/2021, 1921, and 09/01/2021. CT chest 08/24/2021.  TECHNIQUE: Single radiographic AP view of the chest was obtained.  FINDINGS: Lordotic positioning. Overlying artifacts. Stable endotracheal tube, partially imaged enteric tube, right IJ central  venous catheter, and left IJ central venous catheter. Slightly decreased opacity in the upper left lung. Otherwise, stable diffuse lung opacities, greatest in the lung bases and base of the right upper lobe. No pneumothorax. Unchanged cardiomediastinal contours. No acute osseous abnormality is identified.       1. Stable support tubes and lines. 2. Decreased opacity in the upper left lung, likely improvement pneumonia, with otherwise similar-appearing multifocal lung opacities.  Electronically Signed By-Timothy Briones MD On:9/4/2021 10:08 AM This report was finalized on [id number] by  Timothy Briones MD.    XR Chest 1 View    Result Date: 9/3/2021  DATE OF EXAM: 9/3/2021 2:36 PM  PROCEDURE: XR CHEST 1 VW-  INDICATIONS: ARDS, acute respiratory failure with hypoxia.  COMPARISON: 09/02/2021.  TECHNIQUE: Single radiographic view of the chest was obtained.  FINDINGS: The endotracheal tube and bilateral central lines remain stable. The nasogastric tube tip terminates within the fundus of the stomach just below the GE junction. I would recommend advancing the tube approximately 5 to 10 cm. There is worsening right upper lobe airspace disease. There is unchanged mixed interstitial/airspace disease throughout the remaining lungs. The patient has a small right pleural effusion which appears new. There is a tiny left pleural effusion which is unchanged.  The heart size is normal for technique.       1. Worsening right upper lobe airspace disease.  No interval change in the bilateral mixed interstitial/airspace disease which can be seen with ARDS, multifocal pneumonia, or pulmonary edema. 2. The nasogastric tube tip terminates within the fundus of the stomach just below the GE junction. I would recommend advancing the nasogastric tube approximately 5 to 10 cm. 3. New small right pleural effusion.  Electronically Signed By-Chirag Nguyen MD On:9/3/2021 2:50 PM This report was finalized on [id number] by  Chirag Nguyen  MD.      Cardiology    Laboratory    Results from last 7 days   Lab Units 09/04/21  0359 09/03/21  0303 09/02/21  0301 09/01/21  0523 08/31/21  0538 08/30/21  0325 08/29/21  0518   WBC 10*3/mm3 29.70* 28.70* 22.20* 22.50* 21.10* 21.50* 20.00*   HEMOGLOBIN g/dL 10.5* 11.1* 10.5* 11.6* 11.7* 11.4* 10.9*   HEMATOCRIT % 31.5* 32.2* 32.8* 34.9* 36.3* 35.1* 33.4*   PLATELETS 10*3/mm3 115* 154 176 170 169 147 145     Results from last 7 days   Lab Units 09/04/21  0359 09/03/21  1800 09/03/21  0831 09/03/21  0303 09/02/21  0301 09/01/21  0523 08/31/21  0537   SODIUM mmol/L 146* 149* 162* 161* 151* 150* 147*   POTASSIUM mmol/L 4.9 4.1 4.2 3.8 4.4 4.7 4.5   CHLORIDE mmol/L 113* 114* 130* 130* 118* 118* 115*   CO2 mmol/L 20.0* 21.0* 22.0 18.0* 21.0* 21.0* 21.0*   BUN mg/dL 91* 70* 88* 92* 101* 89* 81*   CREATININE mg/dL 2.30* 1.93* 1.84* 1.80* 1.89* 1.68* 1.84*   GLUCOSE mg/dL 222* 216* 85 164* 450* 340* 177*   ALBUMIN g/dL 2.90* 3.00* 3.00* 3.00* 3.20* 2.50* 2.50*   BILIRUBIN mg/dL 1.7* 2.5* 2.0* 2.3* 1.7* 1.9* 2.6*   ALK PHOS U/L 101 116 110 98 93 99 108   AST (SGOT) U/L 29 55* 56* 31 18 18 20   ALT (SGPT) U/L 81* 92* 74* 47* 35 31 29   CALCIUM mg/dL 8.3* 8.4* 9.1 9.2 9.4 8.8 8.7     Results from last 7 days   Lab Units 09/03/21  0303   CK TOTAL U/L 29             Microbiology   Microbiology Results (last 10 days)     Procedure Component Value - Date/Time    AFB Culture - Wash, Bronchus [[id number]] Collected: 08/31/21 0909    Lab Status: Preliminary result Specimen: Wash from Bronchus Updated: 09/01/21 1221     AFB Stain No acid fast bacilli seen    Respiratory Culture - Wash, Bronchus [[id number]] Collected: 08/31/21 0909    Lab Status: Final result Specimen: Wash from Bronchus Updated: 09/02/21 1132     Respiratory Culture No growth     Gram Stain Few (2+) WBCs per low power field      Rare (1+) Mixed bacterial morphotypes seen on Gram Stain    Respiratory Panel, PCR (WITHOUT COVID) - Wash, Bronchus [[id number]]  (Normal)  "Collected: 08/31/21 0909    Lab Status: Final result Specimen: Wash from Bronchus Updated: 08/31/21 1056     ADENOVIRUS, PCR Not Detected     Coronavirus 229E Not Detected     Coronavirus HKU1 Not Detected     Coronavirus NL63 Not Detected     Coronavirus OC43 Not Detected     Human Metapneumovirus Not Detected     Human Rhinovirus/Enterovirus Not Detected     Influenza B PCR Not Detected     Parainfluenza Virus 1 Not Detected     Parainfluenza Virus 2 Not Detected     Parainfluenza Virus 3 Not Detected     Parainfluenza Virus 4 Not Detected     Bordetella pertussis pcr Not Detected     Chlamydophila pneumoniae PCR Not Detected     Mycoplasma pneumo by PCR Not Detected     Influenza A PCR Not Detected     RSV, PCR Not Detected     Bordetella parapertussis PCR Not Detected    Narrative:      The coronavirus on the RVP is NOT COVID-19 and is NOT indicative of infection with COVID-19.    In the setting of a positive respiratory panel with a viral infection PLUS a negative procalcitonin without other underlying concern for bacterial infection, consider observing off antibiotics or discontinuation of antibiotics and continue supportive care. If the respiratory panel is positive for atypical bacterial infection (Bordetella pertussis, Chlamydophila pneumoniae, or Mycoplasma pneumoniae), consider antibiotic de-escalation to target atypical bacterial infection.    Histoplasma Antigen, CSF or BAL - Wash, Bronchus [[id number]] Collected: 08/31/21 0909    Lab Status: Final result Specimen: Wash from Bronchus Updated: 09/03/21 0713     Result None Detected ng/mL      Comment:                                           None Detected        Interpretation Negative     Comment: Positive results reported in ng/mL from 0.20 ng/mL to 20.00 ng/mL  Positive results above 20.00 ng/mL are reported as \"Above the  Limit of Quantification\"       Narrative:      Performed at:   - [facility]  [address] [place] [address], [place]  " [id number]  : Al Heredia MD, Phone:  [phone]    Cytomegalovirus (CMV) By PCR - Wash, Bronchus [[id number]] Collected: 08/31/21 0909    Lab Status: Final result Specimen: Wash from Bronchus Updated: 09/03/21 2308     Specimen Source WASH     Cytomegalovirus PCR Negative     Comment: -------------------ADDITIONAL INFORMATION-------------------  This test was developed and its performance characteristics  determined by [facility] in a manner consistent with CLIA  requirements. This test has not been cleared or approved by  the U.S. Food and Drug Administration.       Narrative:      Performed at:  01 - [facility] [address]  [id number]  : Rahul Recinos , Phone:  [phone]    COVID-19,[facility] ROUTINE, NP/OP SWAB IN TRANSPORT MEDIA OR ESWAB 72 HR TAT - Wash, Bronchus [[id number]] Collected: 08/31/21 0909    Lab Status: Final result Specimen: Wash from Bronchus Updated: 09/02/21 0338     SARS-CoV-2, DISHA Not Detected     Comment: This nucleic acid amplification test was developed and its performance  characteristics determined by [facility]. Nucleic acid  amplification tests include RT-PCR and TMA. This test has not been  FDA cleared or approved. This test has been authorized by FDA under  an Emergency Use Authorization (EUA). This test is only authorized  for the duration of time the declaration that circumstances exist  justifying the authorization of the emergency use of in vitro  diagnostic tests for detection of SARS-CoV-2 virus and/or diagnosis  of COVID-19 infection under section 564(b)(1) of the Act, 21 U.S.C.  360bbb-3(b) (1), unless the authorization is terminated or revoked  sooner.  When diagnostic testing is negative, the possibility of a false  negative result should be considered in the context of a patient's  recent exposures and the presence of clinical signs and symptoms  consistent with COVID-19. An individual without  symptoms of COVID-19  and who is not shedding SARS-CoV-2 virus would expect to have a  negative (not detected) result in this assay.       Narrative:      Performed at:  01 - [facility]  [id number], [facility], [place]  [id number]  : Susannah Tesfaye [facility], Phone:  [phone]    Blood Culture - Blood, Hand, Right [[id number]] Collected: 08/30/21 1415    Lab Status: Preliminary result Specimen: Blood from Hand, Right Updated: 09/03/21 1431     Blood Culture No growth at 4 days    Blood Culture - Blood, Blood, Central Line [[id number]] Collected: 08/30/21 1333    Lab Status: Preliminary result Specimen: Blood, Central Line Updated: 09/03/21 1345     Blood Culture No growth at 4 days    S. Pneumo Ag Urine or CSF - Urine, Urine, Catheter [[id number]]  (Normal) Collected: 08/29/21 1246    Lab Status: Final result Specimen: Urine, Catheter Updated: 08/29/21 1315     Strep Pneumo Ag Negative    Legionella Antigen, Urine - Urine, Urine, Catheter [[id number]]  (Normal) Collected: 08/29/21 1246    Lab Status: Final result Specimen: Urine, Catheter Updated: 08/29/21 1315     LEGIONELLA ANTIGEN, URINE Negative    Respiratory Panel PCR w/COVID-19(SARS-CoV-2) MARY KAY/KERRIE/MALENA/PAD/COR/MAD/BUZZ In-House, NP Swab in UTM/VTM, 3-4 HR TAT - Swab, Nasopharynx [[id number]]  (Normal) Collected: 08/29/21 1246    Lab Status: Final result Specimen: Swab from Nasopharynx Updated: 08/29/21 1355     ADENOVIRUS, PCR Not Detected     Coronavirus 229E Not Detected     Coronavirus HKU1 Not Detected     Coronavirus NL63 Not Detected     Coronavirus OC43 Not Detected     COVID19 Not Detected     Human Metapneumovirus Not Detected     Human Rhinovirus/Enterovirus Not Detected     Influenza A PCR Not Detected     Influenza B PCR Not Detected     Parainfluenza Virus 1 Not Detected     Parainfluenza Virus 2 Not Detected     Parainfluenza Virus 3 Not Detected     Parainfluenza Virus 4 Not Detected     RSV, PCR Not Detected     Bordetella pertussis pcr Not  Detected     Bordetella parapertussis PCR Not Detected     Chlamydophila pneumoniae PCR Not Detected     Mycoplasma pneumo by PCR Not Detected    Narrative:      In the setting of a positive respiratory panel with a viral infection PLUS a negative procalcitonin without other underlying concern for bacterial infection, consider observing off antibiotics or discontinuation of antibiotics and continue supportive care. If the respiratory panel is positive for atypical bacterial infection (Bordetella pertussis, Chlamydophila pneumoniae, or Mycoplasma pneumoniae), consider antibiotic de-escalation to target atypical bacterial infection.    BAL Culture, Quantitative - Lavage, Bronchus [[id number]]  (Abnormal)  (Susceptibility) Collected: 08/27/21 1233    Lab Status: Final result Specimen: Lavage from Bronchus Updated: 08/29/21 1006     BAL Culture 25,000 CFU/mL Staphylococcus aureus      No Normal Respiratory Muna     Gram Stain Few (2+) WBCs per low power field      Rare (1+) Gram positive cocci    Susceptibility      Staphylococcus aureus      SATISH      Clindamycin Susceptible      Oxacillin Susceptible      Penicillin G Resistant      Tetracycline Susceptible      Trimethoprim + Sulfamethoxazole Susceptible      Vancomycin Susceptible               Linear View                         Medication Review:       Schedule Meds  amLODIPine, 10 mg, Oral, Q24H  arformoterol, 15 mcg, Nebulization, BID - RT  budesonide, 0.5 mg, Nebulization, BID - RT  cloNIDine, 1 patch, Transdermal, Weekly  famotidine, 20 mg, Nasogastric, Daily  folic acid, 1 mg, Oral, Daily  hydrALAZINE, 100 mg, Nasogastric, Q8H  ipratropium-albuterol, 3 mL, Nebulization, Q4H - RT  methylPREDNISolone sodium succinate, 40 mg, Intravenous, Q8H  metoclopramide, 5 mg, Intravenous, Q8H  multivitamin, 1 tablet, Oral, Daily  piperacillin-tazobactam, 4.5 g, Intravenous, Q12H  polyethylene glycol, 17 g, Nasogastric, Daily  sildenafil, 20 mg, Oral, Q8H  sodium chloride, 3  mL, Intravenous, Q12H  thiamine, 100 mg, Nasogastric, Daily        Infusion Meds  Adult Central Clinimix TPN, , Last Rate: 63 mL/hr at 09/03/21 1759  Adult Central Clinimix TPN,   clevidipine, 2-32 mg/hr, Last Rate: 15 mg/hr (09/04/21 0913)  fentanyl 10 mcg/mL,  mcg/hr, Last Rate: 200 mcg/hr (09/03/21 2336)  insulin, 1-20 Units/hr, Last Rate: 10 Units/hr (09/04/21 0920)  labetalol, 0.5-2 mg/min, Last Rate: Stopped (09/03/21 1600)  midazolam 1 mg/mL 100mL NS, 1-10 mg/hr, Last Rate: 5 mg/hr (09/03/21 1654)  Pharmacy to Dose TPN,   Pharmacy to Dose Zosyn,   sodium chloride, 100 mL/hr, Last Rate: 100 mL/hr (09/04/21 1033)        PRN Meds  •  acetaminophen  •  albumin human  •  alteplase 2 mg vial + sterile water for injection  •  bisacodyl  •  carboxymethylcellulose sod  •  clevidipine  •  dextrose  •  dextrose  •  dextrose  •  glucagon (human recombinant)  •  hydrALAZINE  •  LORazepam  •  ondansetron **OR** ondansetron  •  Pharmacy to Dose TPN  •  Pharmacy to Dose Zosyn  •  sodium chloride  •  vecuronium        Assessment/Plan       Antimicrobial Therapy   1.  IV Zosyn      day  2.      Day  3.      Day  4.      Day  5.      Day    Assessment     Severe respiratory failure on the ventilator 100% FiO2.  Patient was screened negative twice for Covid 19.  The patient is probably in ARDS.  The other possibility is pulmonary edema     The patient was admitted with chest pain and was found to have type B dissection of thoracic aortic aneurysm     The patient had history of alcohol abuse.  The patient developed alcohol withdrawal 1 week ago     Distended abdomen.  The etiology is not clear.  Bedside ultrasound showed minimal fluid.  Lactate was normal     History of alcohol abuse.  Patient was drinking 15 cans of beer every day     Positive BAL for Bordetella pertussis PCR on August 23, 2021.  Patient was treated with 5 days of azithromycin     Hospital-acquired pneumonia with methicillin susceptible Staphylococcus  aureus from sputum culture done on August 25, 2021.  The patient had bilateral lower lobe infiltrates on the most recent CAT scan.  On admission patient had no pneumonia     Malignant hypertension.    The patient was previously on labetalol drip            Plan     Continue IV Zosyn 3.375 g every 8 hours for total of 14 days  Supportive care  A.m. labs  Prognosis is very guarded  Possible CT of the abdomen and pelvis, chest, and head if patient is able to be stabilized        Ronit Abbott MD  09/04/21  11:23 EDT      Note is dictated utilizing voice recognition software/Dragon

## 2021-09-04 NOTE — PROGRESS NOTES
NEPHROLOGY PROGRESS NOTE------[facility]    [facility]  [phone]  Ranulfo Henley MD      Patient Care Team:  Provider, No Known as PCP - Ranulfo Marinelli MD as Consulting Physician (Nephrology)      Provider:  Ranulfo Henley MD  Patient Name: Geovanny Em  :  1975    SUBJECTIVE:    F/U ARF/VANESSA/HTN    SEDATED AND INTUBATED ON VENT.  Had bronc this morning      Medication:  amLODIPine, 10 mg, Oral, Q24H  arformoterol, 15 mcg, Nebulization, BID - RT  budesonide, 0.5 mg, Nebulization, BID - RT  cloNIDine, 1 patch, Transdermal, Weekly  famotidine, 20 mg, Nasogastric, Daily  folic acid, 1 mg, Oral, Daily  hydrALAZINE, 100 mg, Nasogastric, Q8H  ipratropium-albuterol, 3 mL, Nebulization, Q4H - RT  methylPREDNISolone sodium succinate, 40 mg, Intravenous, Q8H  metoclopramide, 5 mg, Intravenous, Q8H  multivitamin, 1 tablet, Oral, Daily  piperacillin-tazobactam, 4.5 g, Intravenous, Q12H  polyethylene glycol, 17 g, Nasogastric, Daily  sildenafil, 20 mg, Oral, Q8H  sodium chloride, 3 mL, Intravenous, Q12H  thiamine, 100 mg, Nasogastric, Daily      Adult Central Clinimix TPN, , Last Rate: 63 mL/hr at 21 1759  Adult Central Clinimix TPN,   clevidipine, 2-32 mg/hr, Last Rate: 15 mg/hr (21 0913)  fentanyl 10 mcg/mL,  mcg/hr, Last Rate: 200 mcg/hr (21 2336)  insulin, 1-20 Units/hr, Last Rate: 10 Units/hr (21 0920)  labetalol, 0.5-2 mg/min, Last Rate: Stopped (21 1600)  midazolam 1 mg/mL 100mL NS, 1-10 mg/hr, Last Rate: 5 mg/hr (21 1654)  Pharmacy to Dose TPN,   Pharmacy to Dose Zosyn,   sodium chloride, 100 mL/hr, Last Rate: 100 mL/hr (21 1033)        OBJECTIVE    Vital Sign Min/Max for last 24 hours  Temp  Min: 98.8 °F (37.1 °C)  Max: 100.3 °F (37.9 °C)   BP  Min: 110/56  Max: 139/68   Pulse  Min: 53  Max: 67   Resp  Min: 11  Max: 30   SpO2  Min: 89 %  Max: 98 %   No data recorded   No data recorded  "    Flowsheet Rows      First Filed Value   Admission Height  177.8 cm (70\") Documented at 08/21/2021 0136   Admission Weight  94.3 kg (207 lb 14.3 oz) Documented at 08/21/2021 0136          No intake/output data recorded.  I/O last 3 completed shifts:  In: 7590 [I.V.:5499]  Out: 5770 [Urine:2770; Other:3000]    Physical Exam:  ON CLEVIPREX  General Appearance: SEDATED AND INTUBATED ON VENT  Head: normocephalic, without obvious abnormality and atraumatic +ETT INTACT/NGT  Eyes: conjunctivae and sclerae normal and no icterus  Neck: supple   Lungs: diminished breath sounds   Heart: regular rhythm & normal rate and normal S1, S2 +SASCHA  Chest: Wall no abnormalities observed  Abdomen: ABDOMEN DISTENDED +HYPOACTIVE BS +CRUZ  Extremities: 1+ edema, no cyanosis and no redness  Skin: no bleeding, bruising or rash, turgor normal, color normal and no lesions noted  Neurologic: INTUBATED ON VENT     Labs:    WBC WBC   Date Value Ref Range Status   09/04/2021 29.70 (H) 3.40 - 10.80 10*3/mm3 Final   09/03/2021 28.70 (H) 3.40 - 10.80 10*3/mm3 Final   09/02/2021 22.20 (H) 3.40 - 10.80 10*3/mm3 Final      HGB Hemoglobin   Date Value Ref Range Status   09/04/2021 10.5 (L) 13.0 - 17.7 g/dL Final   09/03/2021 11.1 (L) 13.0 - 17.7 g/dL Final   09/02/2021 10.5 (L) 13.0 - 17.7 g/dL Final      HCT Hematocrit   Date Value Ref Range Status   09/04/2021 31.5 (L) 37.5 - 51.0 % Final   09/03/2021 32.2 (L) 37.5 - 51.0 % Final   09/02/2021 32.8 (L) 37.5 - 51.0 % Final      Platlets No results found for: LABPLAT   MCV MCV   Date Value Ref Range Status   09/04/2021 97.7 (H) 79.0 - 97.0 fL Final   09/03/2021 98.2 (H) 79.0 - 97.0 fL Final   09/02/2021 97.4 (H) 79.0 - 97.0 fL Final          Sodium Sodium   Date Value Ref Range Status   09/04/2021 146 (H) 136 - 145 mmol/L Final   09/03/2021 149 (H) 136 - 145 mmol/L Final   09/03/2021 162 (C) 136 - 145 mmol/L Final   09/03/2021 161 (C) 136 - 145 mmol/L Final   09/02/2021 151 (H) 136 - 145 mmol/L Final    "   Potassium Potassium   Date Value Ref Range Status   09/04/2021 4.9 3.5 - 5.2 mmol/L Final     Comment:     Slight hemolysis detected by analyzer. Results may be affected.   09/03/2021 4.1 3.5 - 5.2 mmol/L Final   09/03/2021 4.2 3.5 - 5.2 mmol/L Final     Comment:     Slight hemolysis detected by analyzer. Results may be affected.   09/03/2021 3.8 3.5 - 5.2 mmol/L Final     Comment:     Slight hemolysis detected by analyzer. Results may be affected.   09/02/2021 4.4 3.5 - 5.2 mmol/L Final      Chloride Chloride   Date Value Ref Range Status   09/04/2021 113 (H) 98 - 107 mmol/L Final   09/03/2021 114 (H) 98 - 107 mmol/L Final   09/03/2021 130 (H) 98 - 107 mmol/L Final   09/03/2021 130 (H) 98 - 107 mmol/L Final   09/02/2021 118 (H) 98 - 107 mmol/L Final      CO2 CO2   Date Value Ref Range Status   09/04/2021 20.0 (L) 22.0 - 29.0 mmol/L Final   09/03/2021 21.0 (L) 22.0 - 29.0 mmol/L Final   09/03/2021 22.0 22.0 - 29.0 mmol/L Final   09/03/2021 18.0 (L) 22.0 - 29.0 mmol/L Final   09/02/2021 21.0 (L) 22.0 - 29.0 mmol/L Final      BUN BUN   Date Value Ref Range Status   09/04/2021 91 (H) 6 - 20 mg/dL Final   09/03/2021 70 (H) 6 - 20 mg/dL Final   09/03/2021 88 (H) 6 - 20 mg/dL Final   09/03/2021 92 (H) 6 - 20 mg/dL Final   09/02/2021 101 (H) 6 - 20 mg/dL Final      Creatinine Creatinine   Date Value Ref Range Status   09/04/2021 2.30 (H) 0.76 - 1.27 mg/dL Final   09/03/2021 1.93 (H) 0.76 - 1.27 mg/dL Final   09/03/2021 1.84 (H) 0.76 - 1.27 mg/dL Final   09/03/2021 1.80 (H) 0.76 - 1.27 mg/dL Final   09/02/2021 1.89 (H) 0.76 - 1.27 mg/dL Final      Calcium Calcium   Date Value Ref Range Status   09/04/2021 8.3 (L) 8.6 - 10.5 mg/dL Final   09/03/2021 8.4 (L) 8.6 - 10.5 mg/dL Final   09/03/2021 9.1 8.6 - 10.5 mg/dL Final   09/03/2021 9.2 8.6 - 10.5 mg/dL Final   09/02/2021 9.4 8.6 - 10.5 mg/dL Final      PO4 No components found for: PO4   Albumin Albumin   Date Value Ref Range Status   09/04/2021 2.90 (L) 3.50 - 5.20 g/dL  Final   09/03/2021 3.00 (L) 3.50 - 5.20 g/dL Final   09/03/2021 3.00 (L) 3.50 - 5.20 g/dL Final   09/03/2021 3.00 (L) 3.50 - 5.20 g/dL Final   09/02/2021 3.20 (L) 3.50 - 5.20 g/dL Final      Magnesium Magnesium   Date Value Ref Range Status   09/04/2021 2.3 1.6 - 2.6 mg/dL Final   09/03/2021 2.7 (H) 1.6 - 2.6 mg/dL Final   09/02/2021 2.9 (H) 1.6 - 2.6 mg/dL Final      Uric Acid No components found for: URIC ACID     Imaging Results (Last 72 Hours)     Procedure Component Value Units Date/Time    XR Chest 1 View [[id number]] Collected: 09/04/21 1005     Updated: 09/04/21 1010    Narrative:      DATE OF EXAM:  9/4/2021 6:12 AM     PROCEDURE:  XR CHEST 1 VW-     INDICATIONS:  Shortness of breath. Aortic dissection.     COMPARISON:  Chest radiograph 09/03/2021, 1921, and 09/01/2021. CT chest 08/24/2021.     TECHNIQUE:   Single radiographic AP view of the chest was obtained.     FINDINGS:  Lordotic positioning. Overlying artifacts. Stable endotracheal tube,  partially imaged enteric tube, right IJ central venous catheter, and  left IJ central venous catheter. Slightly decreased opacity in the upper  left lung. Otherwise, stable diffuse lung opacities, greatest in the  lung bases and base of the right upper lobe. No pneumothorax. Unchanged  cardiomediastinal contours. No acute osseous abnormality is identified.        Impression:         1. Stable support tubes and lines.  2. Decreased opacity in the upper left lung, likely improvement  pneumonia, with otherwise similar-appearing multifocal lung opacities.     Electronically Signed By-Timothy Briones MD On:9/4/2021 10:08 AM  This report was finalized on [id number] by  Timothy Briones MD.    XR Chest 1 View [[id number]] Collected: 09/03/21 1447     Updated: 09/03/21 1452    Narrative:      DATE OF EXAM:  9/3/2021 2:36 PM     PROCEDURE:  XR CHEST 1 VW-     INDICATIONS:  ARDS, acute respiratory failure with hypoxia.      COMPARISON:  09/02/2021.     TECHNIQUE:   Single  radiographic view of the chest was obtained.     FINDINGS:  The endotracheal tube and bilateral central lines remain stable. The  nasogastric tube tip terminates within the fundus of the stomach just  below the GE junction. I would recommend advancing the tube  approximately 5 to 10 cm. There is worsening right upper lobe airspace  disease. There is unchanged mixed interstitial/airspace disease  throughout the remaining lungs. The patient has a small right pleural  effusion which appears new. There is a tiny left pleural effusion which  is unchanged.  The heart size is normal for technique.       Impression:         1. Worsening right upper lobe airspace disease.  No interval change in  the bilateral mixed interstitial/airspace disease which can be seen with  ARDS, multifocal pneumonia, or pulmonary edema.  2. The nasogastric tube tip terminates within the fundus of the stomach  just below the GE junction. I would recommend advancing the nasogastric  tube approximately 5 to 10 cm.  3. New small right pleural effusion.     Electronically Signed By-Chirag Nguyen MD On:9/3/2021 2:50 PM  This report was finalized on [id number] by  Chirag Nguyen MD.    XR Chest 1 View [[id number]] Collected: 09/02/21 1041     Updated: 09/02/21 1045    Narrative:      Examination: XR CHEST 1 VW-     Date of Exam: 9/2/2021 10:23 AM     Indication: worsening hypoxia, f/u ptx; I71.00-Dissection of unspecified  site of aorta; J96.01-Acute respiratory failure with hypoxia;  J18.9-Pneumonia, unspecified organism.     Comparison: September 1, 2021     Technique: 1 view of the chest      Findings:  There is a right internal jugular central line with the tip in the  superior vena cava. There is an endotracheal tube at the level of  clavicles. There is a nasogastric tube below the diaphragm. There are  diffuse bilateral alveolar airspace opacities greater on the left than  the right and appearing similar to the prior study. There is no  definite  pneumothorax.       Impression:      Diffuse bilateral infiltrates persist greater on the left than the  right. Aeration is similar to the prior study. Tubes and lines appear  properly positioned.     Electronically Signed By-Roel Morin MD On:9/2/2021 10:43 AM  This report was finalized on [id number] by  Roel Morin MD.          Results for orders placed during the hospital encounter of 08/21/21    XR Chest 1 View    Narrative  DATE OF EXAM:  9/3/2021 2:36 PM    PROCEDURE:  XR CHEST 1 VW-    INDICATIONS:  ARDS, acute respiratory failure with hypoxia.    COMPARISON:  09/02/2021.    TECHNIQUE:  Single radiographic view of the chest was obtained.    FINDINGS:  The endotracheal tube and bilateral central lines remain stable. The  nasogastric tube tip terminates within the fundus of the stomach just  below the GE junction. I would recommend advancing the tube  approximately 5 to 10 cm. There is worsening right upper lobe airspace  disease. There is unchanged mixed interstitial/airspace disease  throughout the remaining lungs. The patient has a small right pleural  effusion which appears new. There is a tiny left pleural effusion which  is unchanged.  The heart size is normal for technique.    Impression  1. Worsening right upper lobe airspace disease.  No interval change in  the bilateral mixed interstitial/airspace disease which can be seen with  ARDS, multifocal pneumonia, or pulmonary edema.  2. The nasogastric tube tip terminates within the fundus of the stomach  just below the GE junction. I would recommend advancing the nasogastric  tube approximately 5 to 10 cm.  3. New small right pleural effusion.    Electronically Signed By-Chirag Nguyen MD On:9/3/2021 2:50 PM  This report was finalized on [id number] by  Chirag Nguyen MD.      XR Chest 1 View    Narrative  DATE OF EXAM:  9/4/2021 6:12 AM    PROCEDURE:  XR CHEST 1 VW-    INDICATIONS:  Shortness of breath. Aortic dissection.    COMPARISON:  Chest  radiograph 09/03/2021, 1921, and 09/01/2021. CT chest 08/24/2021.    TECHNIQUE:  Single radiographic AP view of the chest was obtained.    FINDINGS:  Lordotic positioning. Overlying artifacts. Stable endotracheal tube,  partially imaged enteric tube, right IJ central venous catheter, and  left IJ central venous catheter. Slightly decreased opacity in the upper  left lung. Otherwise, stable diffuse lung opacities, greatest in the  lung bases and base of the right upper lobe. No pneumothorax. Unchanged  cardiomediastinal contours. No acute osseous abnormality is identified.    Impression  1. Stable support tubes and lines.  2. Decreased opacity in the upper left lung, likely improvement  pneumonia, with otherwise similar-appearing multifocal lung opacities.    Electronically Signed By-Timothy Briones MD On:9/4/2021 10:08 AM  This report was finalized on [id number] by  Timothy Briones MD.      XR Chest 1 View    Narrative  Examination: XR CHEST 1 VW-    Date of Exam: 9/2/2021 10:23 AM    Indication: worsening hypoxia, f/u ptx; I71.00-Dissection of unspecified  site of aorta; J96.01-Acute respiratory failure with hypoxia;  J18.9-Pneumonia, unspecified organism.    Comparison: September 1, 2021    Technique: 1 view of the chest    Findings:  There is a right internal jugular central line with the tip in the  superior vena cava. There is an endotracheal tube at the level of  clavicles. There is a nasogastric tube below the diaphragm. There are  diffuse bilateral alveolar airspace opacities greater on the left than  the right and appearing similar to the prior study. There is no definite  pneumothorax.    Impression  Diffuse bilateral infiltrates persist greater on the left than the  right. Aeration is similar to the prior study. Tubes and lines appear  properly positioned.    Electronically Signed By-Roel Morin MD On:9/2/2021 10:43 AM  This report was finalized on [id number] by  Roel Morin MD.            ASSESSMENT  / PLAN      Aortic aneurysm and dissection (CMS/HCC)    Tobacco abuse    Hypertension    Medically noncompliant    Dissection of aorta (CMS/HCC)    Alcohol abuse    Acute respiratory failure with hypoxia (CMS/HCC)    Pertussis pneumonia    VANESSA (acute kidney injury)    COVID-19 vaccine series declined    Acute encephalopathy    Pneumonia due to infectious organism    ARDS (adult respiratory distress syndrome)    Hypertensive urgency    1. ARF/VANESSA----- +ATN/DIANDRA from BP irregularities and IV dye exposure. Gradual BP reduction. No NSAIDs.  Dose meds for CrCl less than 10 cc/min.     2. ACCELERATED HTN-----BP okay. On Labetalol gtt.  Follow with UF with HD    3. H/O ETOH ABUSE    4. AAA------per CT Surgery    5. GERD/PUD PROPHYLAXIS-----Renal dose adjusted Pepcid    6. MEDICAL NONCOMPLIANCE    7. RESPIRATORY FAILURE/HYPOXIA-----per Pulmonary/CC    8. HYPOMAGNESEMIA------Replaced    9. HYPOKALEMIA-----Replaced    10. HYPOALBUMINEMIA-----S/P IV Albumin to temporize    11. HYPERNATREMIA------Follow post HD with lower sodium bath    12. ABDOMINAL DISTENSION    13. HYPOALBUMINEMIA------S/P IV Albumin to temporize    14. NUTRITION-----On TPN    15. VOLUME EXCESS------UF with HD     16. ACIDOSIS------Metabolic. No elevation in AGAP. Follow post HD     Nonoliguric, with excess volume. We will stop normal saline  Dose Lasix  Increase free water in TPN  Monitor urine output renal function and further need for dialysis    Ranulfo Henley MD  [facility]  [phone]  09/04/21  10:34 EDT

## 2021-09-04 NOTE — PROGRESS NOTES
PULMONARY/CRITICAL CARE PROGRESS NOTE       NAME:     Geovanny Em   AGE:     46 y.o.   SEX:  male   : 1975       MRN:       [id number]          RM:  MALENA CVCU      ASSESSMENT     F/U: AAA dissection, acute respiratory failure    Principal Problem:    Aortic aneurysm and dissection (CMS/HCC)  Active Problems:    Acute respiratory failure with hypoxia (CMS/HCC)    Hypertension    Tobacco abuse    Medically noncompliant    Dissection of aorta (CMS/HCC)    Alcohol abuse    Pertussis pneumonia    VANESSA (acute kidney injury)    COVID-19 vaccine series declined    Acute encephalopathy    Pneumonia due to infectious organism    ARDS (adult respiratory distress syndrome)    Hypertensive urgency    MULTIDISCIPLINARY ROUNDING     -No acute events overnight  -Intubated and sedated  -KUB pending  -SS enema without improvement    Poor prognosis, likely moribund.  Multiple discussions with family at bedside regarding goals of care.  Now DNR and considering comfort measures    PLAN     Aortic aneurysm and dissection, without rupture  -CT Chest, Abdomen, & Pelvis: acute aortic dissection beginning near the level of the left subclavian artery and propagating inferiorly just past the level of the celiac artery as above. It measures approximately 30 cm in length.  -CTS consulted and managing  -Strict BP management, targeting SBP < 140  -Currently off vasoactive drips  -Doesn't tolerate beta blockers due to bradycardia.    Acute respiratory failure with hypoxia  ARDS (Acute respiratory distress syndrome)  Pertussis pneumonia  MSSA pneumonia  -Intubated  secondary to acute alcohol withdrawal  -Refractory hypoxia requiring high ventilator settings and nebulized Flolan  -Respiratory panel reviewed: Positive for Bordetella pertussis PCR  -Repeat COVID-19 swab negative  -Bronchoscopy completed on 2021, follow BAL finalized with normal respiratory irwin  -Ventilator management, wean FiO2 as tolerated  -Titrate to  oxygen saturation of >92%  -Pulmicort, Duonebs scheduled and as needed.  -Sputum culture on 8/25/2021, preliminary result with 2+ Staph aureus, follow culture results  -Has received multiple antibiotics this admission  -ID consulted for further antibiotic management, now on Zosyn  -Requiring inhaled Flolan, high PEEP and high oxygen supplementation.  -Bronchoscopy 8/27, follow BAL: MSSA pneumonia, resistant only to Penicillin G  -Diurese as tolerated.  -Starting on high-dose steroids (8/31), wean pending clinical response, planned weaning to start on 9/2.  -9/2--weaning Flolan.    Suspected Pulmonary hypertension  -Revatio initiated on 8/20    Hypertensive Urgency  Essential hypertension, chronic  -Aggressive blood pressure management per CTS blood pressure parameters  -Currently on multiple medications per feeding tube  -Current regimen: Amlodipine, Coreg, hydralazine and clonidine patch  -Echo reviewed: EF 56-60%  -Avoiding Cardene due to risk of pulmonary shunting, but patient does not tolerate BB's.  -Per CTS, starting on Cleviprex (8/31)    Abdominal distention with increased bladder pressure  -Bladder pressure 22  -General surgery consulted  -Unsuccessful attempt to obtain abdominal CT due to acute desaturation and bradycardia  -Ultrasound abdomen reviewed, scant ascites     Acute encephalopathy  -CT reviewed: Patchy hypodensities in the right frontoparietal junction in the right parietal lobe which were nonspecific and could reflect changes of age indeterminate infarcts or chronic microvascular disease.  Recommended MRI brain stroke protocol.  Also revealed chronic appearing pontine  -Ammonia not elevated  -Consulted neurology  -Unable to obtain MRI due to high O2 needs    Medically noncompliant  -Recommend compliance with prescribed regimen    VANESSA (acute kidney injury)  -Creatinine on admission 0.98, increased to 2.6 prompting nephrology consultation  -Nephrology consulted and managing.  -Avoid NSAIDs,  "nephrotoxic medications, and hypotension.  -Nontunneled hemodialysis catheter placed on 9/2/2021 for planned hemodialysis; delayed by hemodialysis center  -Currently undergoing dialysis with planned removal of 3 L    Alcohol abuse  -History of drinking 12-15 beers daily  -Monitor for signs and symptoms of alcohol withdrawal  -[facility] protocol  -Thiamine, folic acid daily  -Counseled on abstinence of alcohol or substances prior to intubation.    Tobacco abuse  -Counseled on smoking cessation prior to intubation    Clinical features of DEMETRICE  -Recommend outpatient follow up in pulmonary/sleep clinic for sleep study    Code Status: DNR (CODE STATUS was changed by family after meeting with palliative care)  VTE Prophylaxis: SCDs  PUD Prophylaxis: Pepcid  Nutrition: OG tube with tube feedings     Overall a poor prognosis with current  AAA, nursing to discuss with CTS about calling and discussing patient's prognosis involving  AAA specifically.        Huslia & SUBJECTIVE     The patient is a 46-year-old male who presented to the emergency department today for evaluation of low back pain and chest pain.  He is a fairly poor historian however he is able to state that he woke up with the pain this morning and has had no relieving factors.  He states that the pain was a 3-5/10 and he describes it as \"sore\".  He is unable to state if the pain radiates from front to back or radiates to any other areas of his body.  He denies that he has experienced nausea, vomiting, cough, expectoration, abdominal pain, diarrhea, diaphoresis, urinary hesitancy or pain with urination.  He had no aggravating factors for his discomfort.  He reports that he has previously been diagnosed with hypertension however after moving here 5 years ago he did not seek medical evaluation and has not been on prescription medications.  He denies previous cardiac history.  He does state that he is a smoker of 3/4 pack/day of cigarettes and has smoked for 30 " years.     On evaluation in the emergency department the patient had an SBP >200mm/Hg and he was placed on both Cardene drip and nitroglycerin drip.  Chest x-ray revealed enlargement of the aortic notch suggesting an aneurysm.  CT of the chest per PE protocol revealed an acute aortic dissection beginning near the level of the left subclavian artery and propagating inferiorly just past the level of the celiac artery measuring approximately 30 cm in length.  The patient was admitted to the ICU for further evaluation and treatment.The emergency department physician spoke with Dr. Corrales, vascular surgery, who agreed with ICU admission and aggressive blood pressure control.  Dr. Todd, CV surgery, was also consulted and agreed with the current treatment plan.    DAILY UPDATES:  8/22: intubated and sedated  8/23: Intubated and sedated.  Refractory hypoxia  8/24: Intubated, sedated  8/25: Intubated, not following commands, mildly sedated  8/26: Intubated, sedated, not following commands.  8/27: Intubated, sedated, not following commands.  8/28: Intubated and sedated.  Continues to have high O2 needs  8/29: Intubated and sedated.  Inhaled Flolan.  Continues to have high O2 needs  8/30: No acute events.  Intubated and sedated.  Continues to receive inhaled Flolan  8/31: Issues with elevated BP overnight, Cardene restarted.  Family concerned about previous discussions involving possible worsening hypoxia, benefit > risk, continued, but now planned change to Cleviprex.   9/1: On Cleviprex, weaning Flolan.  Intubated with FiO2 100%, PEEP +17.  Intubated, sedated.  9/2: Intubated, sedated, on Flolan.  Currently on hemodialysis.  9/4: No acute events noted.  Still with high ventilator requirements and Flolan.  Currently off pressors    REVIEW OF SYSTEMS:  Review of systems could not be obtained due to   patient sedation status. patient intubated.    MEDICATIONS     SCHEDULED MEDICATIONS:   amLODIPine, 10 mg, Oral,  "Q24H  arformoterol, 15 mcg, Nebulization, BID - RT  budesonide, 0.5 mg, Nebulization, BID - RT  cloNIDine, 1 patch, Transdermal, Weekly  famotidine, 20 mg, Nasogastric, Daily  folic acid, 1 mg, Oral, Daily  hydrALAZINE, 100 mg, Nasogastric, Q8H  ipratropium-albuterol, 3 mL, Nebulization, Q4H - RT  methylPREDNISolone sodium succinate, 40 mg, Intravenous, Q8H  metoclopramide, 5 mg, Intravenous, Q8H  multivitamin, 1 tablet, Oral, Daily  piperacillin-tazobactam, 4.5 g, Intravenous, Q12H  polyethylene glycol, 17 g, Nasogastric, Daily  sildenafil, 20 mg, Oral, Q8H  sodium chloride, 3 mL, Intravenous, Q12H  thiamine, 100 mg, Nasogastric, Daily        CONTINUOUS INFUSIONS:   Adult Central Clinimix TPN, , Last Rate: 63 mL/hr at 09/03/21 1759  Adult Central Clinimix TPN,   fentanyl 10 mcg/mL,  mcg/hr, Last Rate: 200 mcg/hr (09/03/21 2336)  insulin, 1-20 Units/hr, Last Rate: 10 Units/hr (09/04/21 0920)  labetalol, 0.5-2 mg/min, Last Rate: Stopped (09/03/21 1600)  midazolam 1 mg/mL 100mL NS, 1-10 mg/hr, Last Rate: 5 mg/hr (09/03/21 1654)  niCARdipine, 5-15 mg/hr  Pharmacy to Dose TPN,   Pharmacy to Dose Zosyn,   sodium chloride, 100 mL/hr, Last Rate: 100 mL/hr (09/04/21 1033)        PRN MEDS:  •  acetaminophen  •  albumin human  •  alteplase 2 mg vial + sterile water for injection  •  bisacodyl  •  carboxymethylcellulose sod  •  dextrose  •  dextrose  •  dextrose  •  glucagon (human recombinant)  •  hydrALAZINE  •  LORazepam  •  ondansetron **OR** ondansetron  •  Pharmacy to Dose TPN  •  Pharmacy to Dose Zosyn  •  sodium chloride  •  vecuronium    OBJECTIVE     VITAL SIGNS:  /54   Pulse 64   Temp 99.4 °F (37.4 °C) (Oral)   Resp 15   Ht 177.8 cm (70\")   Wt 108 kg (238 lb 12.1 oz)   SpO2 (!) 88%   BMI 34.26 kg/m²     I/O FROM PREVIOUS 3 SHIFTS:  I/O last 3 completed shifts:  In: 7590 [I.V.:5499]  Out: 5770 [Urine:2770; Other:3000]    NET BALANCE SINCE ADMISSION:  Net IO Since Admission: 14,779.41 mL [09/04/21 " 1237]     I/O PREVIOUS 24 HOURS:   Intake/Output                       09/02/21 0701 - 09/03/21 0700 09/03/21 0701 - 09/04/21 0700     8255-3129 4502-0987 Total 8563-1505 6298-5486 Total                 Intake    I.V.  --  3690 3690  1341  468 1809    TPN  --  1508 1508  --  583 583    Total Intake -- 5198 5198 1341 1051 2392       Output    Urine  1500  2000 3500  650  120 770    Other  --  -- --  3000  -- 3000    Ultrafiltration (mL) -- -- -- 3000 -- 3000    Total Output 1500 2000 3500 3650 120 3770           PHYSICAL EXAM:  Constitutional:  Well developed, well nourished, no acute distress, intubated and sedated   Eyes:  PERRL, conjunctiva injected, EOM not assessed  HENT:  Atraumatic, external ears normal, nose normal. OETT. Neck-normal range of motion, no JVD, supple, trachea midline.  Right IJ central line, left IJ nontunneled hemodialysis catheter  Respiratory: Coarse breath sounds, diminished bibasilar breath sound, scattered rhonchi without wheezes, non-labored respirations without accessory muscle use  Cardiovascular:  Normal rate normal rhythm, S1-S2, no murmurs, no gallops, no rubs   GI:  firm and distended, active bowel sounds. Morbid body habitus  : Deferred, Galvan catheter in place.  Musculoskeletal:   Generalized edema, no clubbing, no deformities  Integument:  Well hydrated, no rash   Neurologic:  intubated and sedated, no focal deficits noted  Psychiatric:   Intubated, sedated         RESULTS     LABS:  Lab Results (last 24 hours)     Procedure Component Value Units Date/Time    POC Glucose Once [[id number]]  (Abnormal) Collected: 09/04/21 1138    Specimen: Blood Updated: 09/04/21 1139     Glucose 217 mg/dL      Comment: Serial Number: [id number]:  [id number]       POC Glucose Once [[id number]]  (Abnormal) Collected: 09/04/21 0746    Specimen: Blood Updated: 09/04/21 0746     Glucose 206 mg/dL      Comment: Serial Number: [id number]:  [id number]       POC Glucose Once [[id number]]   (Abnormal) Collected: 09/04/21 0540    Specimen: Blood Updated: 09/04/21 0541     Glucose 208 mg/dL      Comment: Serial Number: [id number]:  [id number]       Phosphorus [[id number]]  (Abnormal) Collected: 09/04/21 0359    Specimen: Blood Updated: 09/04/21 0435     Phosphorus 6.0 mg/dL     Comprehensive Metabolic Panel [[id number]]  (Abnormal) Collected: 09/04/21 0359    Specimen: Blood Updated: 09/04/21 0435     Glucose 222 mg/dL      BUN 91 mg/dL      Creatinine 2.30 mg/dL      Sodium 146 mmol/L      Potassium 4.9 mmol/L      Comment: Slight hemolysis detected by analyzer. Results may be affected.        Chloride 113 mmol/L      CO2 20.0 mmol/L      Calcium 8.3 mg/dL      Total Protein 5.6 g/dL      Albumin 2.90 g/dL      ALT (SGPT) 81 U/L      AST (SGOT) 29 U/L      Comment: Slight hemolysis detected by analyzer. Results may be affected.        Alkaline Phosphatase 101 U/L      Total Bilirubin 1.7 mg/dL      eGFR Non African Amer 31 mL/min/1.73      Globulin 2.7 gm/dL      A/G Ratio 1.1 g/dL      BUN/Creatinine Ratio 39.6     Anion Gap 13.0 mmol/L     Narrative:      GFR Normal >60  Chronic Kidney Disease <60  Kidney Failure <15      Manual Differential [[id number]]  (Abnormal) Collected: 09/04/21 0359    Specimen: Blood Updated: 09/04/21 0432     Neutrophil % 79.0 %      Lymphocyte % 1.0 %      Monocyte % 4.0 %      Bands %  14.0 %      Metamyelocyte % 1.0 %      Myelocyte % 1.0 %      Neutrophils Absolute 27.62 10*3/mm3      Lymphocytes Absolute 0.30 10*3/mm3      Monocytes Absolute 1.19 10*3/mm3      RBC Morphology Normal     Vacuolated Neutrophils Slight/1+     Large Platelets Slight/1+    CBC & Differential [[id number]]  (Abnormal) Collected: 09/04/21 0359    Specimen: Blood Updated: 09/04/21 0431    Narrative:      The following orders were created for panel order CBC & Differential.  Procedure                               Abnormality         Status                     ---------                                -----------         ------                     CBC Auto Differential[[id number]]        Abnormal            Final result               Scan Slide[[id number]]                                       Final result                 Please view results for these tests on the individual orders.    Scan Slide [[id number]] Collected: 09/04/21 0359    Specimen: Blood Updated: 09/04/21 0431     Scan Slide --     Comment: See Manual Differential Results       CBC Auto Differential [[id number]]  (Abnormal) Collected: 09/04/21 0359    Specimen: Blood Updated: 09/04/21 0431     WBC 29.70 10*3/mm3      RBC 3.23 10*6/mm3      Hemoglobin 10.5 g/dL      Hematocrit 31.5 %      MCV 97.7 fL      MCH 32.4 pg      MCHC 33.2 g/dL      RDW 14.9 %      RDW-SD 51.2 fl      MPV 10.0 fL      Platelets 115 10*3/mm3     Narrative:      The previously reported component NRBC is no longer being reported. Previous result was 0.1 /100 WBC (Reference Range: 0.0-0.2 /100 WBC) on 9/4/2021 at 0409 EDT.    Magnesium [[id number]]  (Normal) Collected: 09/04/21 0359    Specimen: Blood Updated: 09/04/21 0430     Magnesium 2.3 mg/dL     Calcium, Ionized [[id number]]  (Normal) Collected: 09/04/21 0359    Specimen: Blood Updated: 09/04/21 0418     Ionized Calcium 1.24 mmol/L     Blood Gas, Arterial - [[id number]]  (Abnormal) Collected: 09/04/21 0338    Specimen: Arterial Blood Updated: 09/04/21 0351     Site Arterial Line     Asher's Test N/A     pH, Arterial 7.360 pH units      pCO2, Arterial 37.8 mm Hg      pO2, Arterial 197.2 mm Hg      HCO3, Arterial 21.4 mmol/L      Base Excess, Arterial -3.7 mmol/L      Comment: Serial Number: [id number]:  [id number]        O2 Saturation, Arterial 99.7 %      CO2 Content 22.5 mmol/L      Barometric Pressure for Blood Gas --     Comment: N/A        Modality Aerosol Mask     FIO2 100 %      Ventilator Mode ;PC     PEEP 17     Hemodilution No     Respiratory Rate 30    POC Glucose Once [[id number]]  (Abnormal) Collected: 09/04/21  0302    Specimen: Blood Updated: 09/04/21 0303     Glucose 206 mg/dL      Comment: Serial Number: [id number]:  [id number]       POC Glucose Once [[id number]]  (Abnormal) Collected: 09/04/21 0204    Specimen: Blood Updated: 09/04/21 0205     Glucose 223 mg/dL      Comment: Serial Number: [id number]:  [id number]       POC Glucose Once [[id number]]  (Abnormal) Collected: 09/04/21 0036    Specimen: Blood Updated: 09/04/21 0038     Glucose 225 mg/dL      Comment: Serial Number: [id number]:  [id number]       POC Glucose Once [[id number]]  (Abnormal) Collected: 09/03/21 2302    Specimen: Blood Updated: 09/04/21 0009     Glucose 256 mg/dL      Comment: Serial Number: [id number]:  [id number]       Cytomegalovirus (CMV) By PCR - Wash, Bronchus [[id number]] Collected: 08/31/21 0909    Specimen: Wash from Bronchus Updated: 09/03/21 2308     Specimen Source WASH     Cytomegalovirus PCR Negative     Comment: -------------------ADDITIONAL INFORMATION-------------------  This test was developed and its performance characteristics  determined by [facility] in a manner consistent with CLIA  requirements. This test has not been cleared or approved by  the U.[place]. Food and Drug Administration.       Narrative:      Performed at:  01 - [facility] [address]  [id number]  : Rahul Recinos , Phone:  [phone]    POC Glucose Once [[id number]]  (Abnormal) Collected: 09/03/21 2207    Specimen: Blood Updated: 09/03/21 2209     Glucose 259 mg/dL      Comment: Serial Number: [id number]:  [id number]       POC Glucose Once [[id number]]  (Abnormal) Collected: 09/03/21 2103    Specimen: Blood Updated: 09/03/21 2109     Glucose 255 mg/dL      Comment: Serial Number: [id number]:  [id number]       Comprehensive Metabolic Panel [[id number]]  (Abnormal) Collected: 09/03/21 1800    Specimen: Blood Updated: 09/03/21 1923     Glucose 216 mg/dL      BUN 70 mg/dL       Creatinine 1.93 mg/dL      Sodium 149 mmol/L      Potassium 4.1 mmol/L      Chloride 114 mmol/L      CO2 21.0 mmol/L      Calcium 8.4 mg/dL      Total Protein 5.7 g/dL      Albumin 3.00 g/dL      ALT (SGPT) 92 U/L      AST (SGOT) 55 U/L      Alkaline Phosphatase 116 U/L      Total Bilirubin 2.5 mg/dL      eGFR Non African Amer 37 mL/min/1.73      Globulin 2.7 gm/dL      A/G Ratio 1.1 g/dL      BUN/Creatinine Ratio 36.3     Anion Gap 14.0 mmol/L     Narrative:      GFR Normal >60  Chronic Kidney Disease <60  Kidney Failure <15             RADIOLOGY:  XR Chest 1 View    Result Date: 9/4/2021  DATE OF EXAM: 9/4/2021 6:12 AM  PROCEDURE: XR CHEST 1 VW-  INDICATIONS: Shortness of breath. Aortic dissection.  COMPARISON: Chest radiograph 09/03/2021, 1921, and 09/01/2021. CT chest 08/24/2021.  TECHNIQUE: Single radiographic AP view of the chest was obtained.  FINDINGS: Lordotic positioning. Overlying artifacts. Stable endotracheal tube, partially imaged enteric tube, right IJ central venous catheter, and left IJ central venous catheter. Slightly decreased opacity in the upper left lung. Otherwise, stable diffuse lung opacities, greatest in the lung bases and base of the right upper lobe. No pneumothorax. Unchanged cardiomediastinal contours. No acute osseous abnormality is identified.       1. Stable support tubes and lines. 2. Decreased opacity in the upper left lung, likely improvement pneumonia, with otherwise similar-appearing multifocal lung opacities.  Electronically Signed By-Timothy Briones MD On:9/4/2021 10:08 AM This report was finalized on [id number] by  Timothy Briones MD.    XR Chest 1 View    Result Date: 9/3/2021  DATE OF EXAM: 9/3/2021 2:36 PM  PROCEDURE: XR CHEST 1 VW-  INDICATIONS: ARDS, acute respiratory failure with hypoxia.  COMPARISON: 09/02/2021.  TECHNIQUE: Single radiographic view of the chest was obtained.  FINDINGS: The endotracheal tube and bilateral central lines remain stable. The nasogastric tube  tip terminates within the fundus of the stomach just below the GE junction. I would recommend advancing the tube approximately 5 to 10 cm. There is worsening right upper lobe airspace disease. There is unchanged mixed interstitial/airspace disease throughout the remaining lungs. The patient has a small right pleural effusion which appears new. There is a tiny left pleural effusion which is unchanged.  The heart size is normal for technique.       1. Worsening right upper lobe airspace disease.  No interval change in the bilateral mixed interstitial/airspace disease which can be seen with ARDS, multifocal pneumonia, or pulmonary edema. 2. The nasogastric tube tip terminates within the fundus of the stomach just below the GE junction. I would recommend advancing the nasogastric tube approximately 5 to 10 cm. 3. New small right pleural effusion.  Electronically Signed By-Chirag Nguyen MD On:9/3/2021 2:50 PM This report was finalized on [id number] by  Chirag Nguyen MD.      ECHOCARDIOGRAM:  Results for orders placed during the hospital encounter of 08/21/21    Adult Transthoracic Echo Limited W/ Cont if Necessary Per Protocol    Interpretation Summary  · Saline test results are negative for right to left atrial level shunt.       I reviewed the patient's new clinical results.    This note has been scribed by me for pulmonary attending physician.    Electronically signed by DEVANTE Emerson, 09/04/21 at 12:37 EDT.       Long discussion with family about severity of his condition as multiorgan failure and poor prognosis and informing them that we can not have good MS evaluation at this time of imaging due to instibility    I personally have examined  and interviewed the patient. I have reviewed the history, data, problems, assessment and plan with our NP.  Critical care time in direct medical management (  33 ) minutes  Electronically signed by Kayla Carter MD, D,ABS, 09/04/21,.

## 2021-09-04 NOTE — PROCEDURES
Bronchoscopy Procedure Note    Name: Geovanny Em   Age:  46 y.o.   Sex: male  :  1975  MRN: [id number]  Time of procedure: 16:46 EDT      Date of Operation: 2021     Pre-op Diagnosis: Acute respiratory failure with hemoptysis    Post-op Diagnosis: same    Surgeon: Kayla Carter    Anesthesia: Conscious Sedation    Operation: Flexible fiberoptic bronchoscopy, diagnostic bronchoscope  Findings: Bloody mucus was possible alveolar hemorrhage    Specimen: Lung wash mixed with BAL    Estimated Blood Loss: less than 10 cc    Drains: none    Complications: None    Indications and History:  The patient is a 46 y.o. with acute respiratory failure requiring high FiO2, with hemoptysis.  The risks, benefits, complications, treatment options and expected outcomes were discussed with the family and informed consent was obtained. The possibilities of reaction to medication, pulmonary aspiration, pneumothorax, bleeding, respiratory failure and failure to diagnose a condition and creating a complication requiring transfusion or operation were discussed with the patient who freely signed the consent.      Description of Procedure:  After the induction of topical nasopharyngeal anesthesia, the bronchoscope was passed through the endotracheal tube . Careful inspection of the tracheal lumen was accomplished.     An entire bronchial exam was performed including the right and left bronchi with the following findings:     Endobronchial findings: Thin bloody mucus bilateral is coming also after flushing the alveoli  All this suctioned washed to multiple segment Mucus     Trachea: Normal  Ngozi: Normal shape  Right upper lobe bronchus: nicely open no lesions  Right midlelobe bronchus:nicely open no lesions  Right lower lobe bronchus: nicely open no lesions  Left main bronchus: nicely open no lesions  Left upper lobe bronchus: nicely open no lesions  Left lower lobe bronchus: nicely open no lesions    The Patient tolerated the  procedure well.       NELIDA Beckham, ABSM  9/4/2021  16:46 EDT

## 2021-09-04 NOTE — PROGRESS NOTES
Reason for follow-up: Type B aortic dissection, hypertension management.     Patient Care Team:  Provider, No Known as PCP - Ranulfo Marinelli MD as Consulting Physician (Nephrology)    Subjective .   Patient remains intubated and sedated.     ROS    Patient has no known allergies.    Scheduled Meds:amLODIPine, 10 mg, Oral, Q24H  arformoterol, 15 mcg, Nebulization, BID - RT  budesonide, 0.5 mg, Nebulization, BID - RT  cloNIDine, 1 patch, Transdermal, Weekly  famotidine, 20 mg, Nasogastric, Daily  folic acid, 1 mg, Oral, Daily  hydrALAZINE, 100 mg, Nasogastric, Q8H  ipratropium-albuterol, 3 mL, Nebulization, Q4H - RT  methylPREDNISolone sodium succinate, 40 mg, Intravenous, Q8H  metoclopramide, 5 mg, Intravenous, Q8H  multivitamin, 1 tablet, Oral, Daily  piperacillin-tazobactam, 4.5 g, Intravenous, Q12H  polyethylene glycol, 17 g, Nasogastric, Daily  sildenafil, 20 mg, Oral, Q8H  sodium chloride, 3 mL, Intravenous, Q12H  thiamine, 100 mg, Nasogastric, Daily      Continuous Infusions:Adult Central Clinimix TPN, , Last Rate: 63 mL/hr at 09/03/21 1759  clevidipine, 2-32 mg/hr, Last Rate: 20 mg/hr (09/04/21 0634)  fentanyl 10 mcg/mL,  mcg/hr, Last Rate: 200 mcg/hr (09/03/21 2336)  insulin, 1-20 Units/hr, Last Rate: 5 Units/hr (09/03/21 2100)  labetalol, 0.5-2 mg/min, Last Rate: Stopped (09/03/21 1600)  midazolam 1 mg/mL 100mL NS, 1-10 mg/hr, Last Rate: 5 mg/hr (09/03/21 1654)  Pharmacy to Dose TPN,   Pharmacy to Dose Zosyn,   sodium chloride, 100 mL/hr, Last Rate: 100 mL/hr (09/03/21 2116)      PRN Meds:.•  acetaminophen  •  albumin human  •  alteplase 2 mg vial + sterile water for injection  •  bisacodyl  •  carboxymethylcellulose sod  •  clevidipine  •  dextrose  •  dextrose  •  dextrose  •  glucagon (human recombinant)  •  hydrALAZINE  •  LORazepam  •  ondansetron **OR** ondansetron  •  Pharmacy to Dose TPN  •  Pharmacy to Dose Zosyn  •  sodium chloride  •  vecuronium      VITAL SIGNS  Vitals:     "09/04/21 0419 09/04/21 0621 09/04/21 0624 09/04/21 0744   BP: 139/68   120/55   BP Location: Right arm      Patient Position: Lying      Pulse: 58 60  67   Resp: (!) 30 (!) 30 (!) 30 14   Temp: 99.7 °F (37.6 °C)   99.7 °F (37.6 °C)   TempSrc: Axillary   Axillary   SpO2: 98% 96%  (!) 89%   Weight:       Height:           Flowsheet Rows      First Filed Value   Admission Height  177.8 cm (70\") Documented at 08/21/2021 0136   Admission Weight  94.3 kg (207 lb 14.3 oz) Documented at 08/21/2021 0136           TELEMETRY: Sinus rhythm.    Physical Exam  VITALS REVIEWED    General:      Intubated and sedated  Head:      normocephalic and atraumatic.    Eyes:      PERRL/EOM intact, conjunctiva and sclera clear with out nystagmus.    Neck:      no masses, thyromegaly,  trachea central with normal respiratory effort and PMI displaced laterally  Lungs:      Wheezing and rhonchi  Heart:       Rate and rhythm  Msk:      no deformity or scoliosis noted of thoracic or lumbar spine.    Pulses:      pulses normal in all 4 extremities.    Extremities:       Trace edema  Neurologic:      Unable to assess  Skin:      intact without lesions or rashes.    Psych:      Unable to assess        LAB RESULTS (LAST 7 DAYS)    CBC  Results from last 7 days   Lab Units 09/04/21  0359 09/03/21  0303 09/02/21  0301 09/01/21  0523 08/31/21  0538 08/30/21  0325 08/29/21  0518   WBC 10*3/mm3 29.70* 28.70* 22.20* 22.50* 21.10* 21.50* 20.00*   RBC 10*6/mm3 3.23* 3.28* 3.36* 3.53* 3.66* 3.59* 3.46*   HEMOGLOBIN g/dL 10.5* 11.1* 10.5* 11.6* 11.7* 11.4* 10.9*   HEMATOCRIT % 31.5* 32.2* 32.8* 34.9* 36.3* 35.1* 33.4*   MCV fL 97.7* 98.2* 97.4* 98.9* 99.1* 97.9* 96.4   PLATELETS 10*3/mm3 115* 154 176 170 169 147 145       BMP  Results from last 7 days   Lab Units 09/04/21  0359 09/03/21  1800 09/03/21  0831 09/03/21  0303 09/02/21  0301 09/01/21  0523 08/31/21  0537 08/30/21  1415 08/30/21  1157 08/30/21  1157 08/30/21  0325 08/30/21  0325   SODIUM mmol/L 146* " 149* 162* 161* 151* 150* 147*  --    < > 149*   < > 148*   POTASSIUM mmol/L 4.9 4.1 4.2 3.8 4.4 4.7 4.5  --    < > 4.4   < > 4.6   CHLORIDE mmol/L 113* 114* 130* 130* 118* 118* 115*  --    < > 116*   < > 116*   CO2 mmol/L 20.0* 21.0* 22.0 18.0* 21.0* 21.0* 21.0*  --    < > 22.0   < > 21.0*   BUN mg/dL 91* 70* 88* 92* 101* 89* 81*  --    < > 83*   < > 85*   CREATININE mg/dL 2.30* 1.93* 1.84* 1.80* 1.89* 1.68* 1.84*  --    < > 1.96*   < > 2.06*   GLUCOSE mg/dL 222* 216* 85 164* 450* 340* 177*  --    < > 149*   < > 150*   MAGNESIUM mg/dL 2.3  --   --  2.7* 2.9* 3.1* 3.0*  --   --  3.0*  --  3.0*   PHOSPHORUS mg/dL 6.0*  --   --  2.6 4.0 5.0* 5.5* 5.1*  --  5.4*  --   --     < > = values in this interval not displayed.       CMP   Results from last 7 days   Lab Units 09/04/21  0359 09/03/21  1800 09/03/21  0831 09/03/21  0303 09/02/21  0301 09/01/21  0523 08/31/21  0537   SODIUM mmol/L 146* 149* 162* 161* 151* 150* 147*   POTASSIUM mmol/L 4.9 4.1 4.2 3.8 4.4 4.7 4.5   CHLORIDE mmol/L 113* 114* 130* 130* 118* 118* 115*   CO2 mmol/L 20.0* 21.0* 22.0 18.0* 21.0* 21.0* 21.0*   BUN mg/dL 91* 70* 88* 92* 101* 89* 81*   CREATININE mg/dL 2.30* 1.93* 1.84* 1.80* 1.89* 1.68* 1.84*   GLUCOSE mg/dL 222* 216* 85 164* 450* 340* 177*   ALBUMIN g/dL 2.90* 3.00* 3.00* 3.00* 3.20* 2.50* 2.50*   BILIRUBIN mg/dL 1.7* 2.5* 2.0* 2.3* 1.7* 1.9* 2.6*   ALK PHOS U/L 101 116 110 98 93 99 108   AST (SGOT) U/L 29 55* 56* 31 18 18 20   ALT (SGPT) U/L 81* 92* 74* 47* 35 31 29         BNP        TROPONIN  Results from last 7 days   Lab Units 09/03/21  0303   CK TOTAL U/L 29       CoAg        Creatinine Clearance  Estimated Creatinine Clearance: 48.3 mL/min (A) (by C-G formula based on SCr of 2.3 mg/dL (H)).    ABG  Results from last 7 days   Lab Units 09/04/21  0338 09/03/21  0448 09/02/21  0359 08/31/21  0609 08/31/21  0358 08/30/21  0329 08/29/21  0310   PH, ARTERIAL pH units 7.360 7.348* 7.341* 7.280* 7.320* 7.313* 7.381   PCO2, ARTERIAL mm Hg  37.8 38.8 41.5 48.3* 43.5 43.0 35.2   PO2 ART mm Hg 197.2* 87.7 84.2 51.4* 72.5* 67.4* 75.6*   O2 SATURATION ART % 99.7* 96.2 95.6 81.1* 93.0* 91.3* 94.9   BASE EXCESS ART mmol/L -3.7* -4.0* -3.2* -4.2* -3.6* -4.3* -3.7*         Assessment/Plan       Aortic aneurysm and dissection (CMS/HCC)    Tobacco abuse    Hypertension    Medically noncompliant    Dissection of aorta (CMS/HCC)    Alcohol abuse    Acute respiratory failure with hypoxia (CMS/HCC)    Pertussis pneumonia    VANESSA (acute kidney injury)    COVID-19 vaccine series declined    Acute encephalopathy    Pneumonia due to infectious organism    ARDS (adult respiratory distress syndrome)    Hypertensive urgency      Mr. Em is a 46-year-old male patient who has type B aortic dissection on conservative management, he does have respiratory failure and remains intubated and sedated.  Yesterday he had dialysis for renal failure and his urine output remains low.  Patient initially presented with hypertensive urgency but his blood pressure is relatively well controlled with the current therapy with hydralazine, clonidine patch and Cleviprex.  Continue same therapy for now.      Nicole Fitzpatrick MD  09/04/21  08:43 EDT

## 2021-09-04 NOTE — PROGRESS NOTES
Pharmacy consult - total parental nutrition    Geovanny Em is a 46 y.o.male admitted with type B aortic dissection. Pharmacy consulted to dose TPN for Nonfunctional or inaccessible GI tract per Dr. Carter's request. Patient was previously on tube feeds 8/23 - 8/27 but turned off d/t issues with abdominal distention and high abdominal pressure.      Assessment  Estimated macronutrient goal requirements per RD:    TPN type: Clinimix 5/20 (2400 mL/day)  Line type: Central  Protein: 120 grams/day (~1.2 grams/kg/day)  Lipids: 250 mL of 20% lipid infusion 2 days/week  Kcal/day: 2327 kcal (~25 kcal/kg/day)    Plan    Will not increase macros today d/t patient being on a significant amount of clevidipine over the past 24 hours (average ~30 ml/hr) , which is a lipid based emulsion. Will continue TPN with the following macros per d/w RD Caroline:    Protein: 75 grams/day  Dextrose: 300 grams/day  Lipids: holding while on clevidipine  Volume: 1500 mL (63 mL/hr over 24 hrs daily)    Sodium and phosphorus are still elevated so will continue to hold from TPN. K is trending back up so will not add today. Ionized calcium and magnesium are trending down, however they were previously elevated for several days so will not add for now. Will also hold trace elements as bilirubin is elevated. Patient still on insulin gtt for hyperglycemia. MVI to be added MWF d/t national shortage. Based on labs, will add the following electrolytes/additives to the TPN:     Date: 8/30 8/31 9/1 9/2 9/3 9/4   TPN Lytes (60mL)         CaGluc (mEq) 8 8       MgSO4 (mEq)         NaCl (mEq)         NaAcetate (mEq)         KCL (mEq)         KAcetate (mEq)         NaPhos (mmol)         Kphos (mmol)         Famotidine (mg)         MVI (10 mL) 10 10 10  10    Trace (1 mL)         Humulin R (units)             Patient also received the following electrolyte replacement per protocol:    N/A.    Pharmacy will continue to follow.          Objective  Relevant clinical  "data and objective history reviewed:  177.8 cm (70\")   108 kg (238 lb 12.1 oz)   Ideal body weight: 73 kg (160 lb 15 oz)  Adjusted ideal body weight: 87.1 kg (192 lb 1 oz)  Body mass index is 34.26 kg/m².    Results from last 7 days   Lab Units 09/04/21  0359 09/03/21  1800 09/03/21  0831 09/03/21  0303 09/02/21  0301 09/01/21  0523 08/31/21  0537 08/30/21  1415 08/30/21  1157 08/30/21  0325 08/29/21  1246 08/29/21  0518   SODIUM mmol/L 146* 149* 162* 161* 151* 150* 147*  --  149* 148*  --  150*   POTASSIUM mmol/L 4.9 4.1 4.2 3.8 4.4 4.7 4.5  --  4.4 4.6  --  4.1   CHLORIDE mmol/L 113* 114* 130* 130* 118* 118* 115*  --  116* 116*  --  118*   CO2 mmol/L 20.0* 21.0* 22.0 18.0* 21.0* 21.0* 21.0*  --  22.0 21.0*  --  20.0*   GLUCOSE mg/dL 222* 216* 85 164* 450* 340* 177*  --  149* 150*  --  147*   BUN mg/dL 91* 70* 88* 92* 101* 89* 81*  --  83* 85*  --  85*   CREATININE mg/dL 2.30* 1.93* 1.84* 1.80* 1.89* 1.68* 1.84*  --  1.96* 2.06*  --  2.23*   CALCIUM mg/dL 8.3* 8.4* 9.1 9.2 9.4 8.8 8.7  --  8.4* 8.5*  --  8.2*   IONIZED CALCIUM mmol/L 1.24  --   --  1.44* 1.38* 1.33* 1.26  --  1.23  --   --   --    PHOSPHORUS mg/dL 6.0*  --   --  2.6 4.0 5.0* 5.5* 5.1* 5.4*  --   --  4.8*   MAGNESIUM mg/dL 2.3  --   --  2.7* 2.9* 3.1* 3.0*  --  3.0* 3.0*  --  2.6   TOTAL PROTEIN g/dL 5.6* 5.7* 5.6* 5.7* 6.3 5.8* 6.1  --  5.7* 6.0 5.6*  --    ALBUMIN g/dL 2.90* 3.00* 3.00* 3.00* 3.20* 2.50* 2.50*  --  2.50* 2.50* 2.50* 2.50*   PREALBUMIN mg/dL  --   --   --   --   --   --   --   --  7.9*  --   --   --    BILIRUBIN mg/dL 1.7* 2.5* 2.0* 2.3* 1.7* 1.9* 2.6*  --  2.1* 1.9* 1.3*  --    ALK PHOS U/L 101 116 110 98 93 99 108  --  104 106 111  --    AST (SGOT) U/L 29 55* 56* 31 18 18 20  --  19 14 13  --    ALT (SGPT) U/L 81* 92* 74* 47* 35 31 29  --  27 27 26  --    TRIGLYCERIDES mg/dL  --   --   --   --   --   --   --   --  157*  --   --   --      I/O last 3 completed shifts:  In: 1148 [I.V.:5499]  Out: 6110 [Urine:4100; " Other:3000]  Estimated Creatinine Clearance: 48.3 mL/min (A) (by C-G formula based on SCr of 2.3 mg/dL (H)).    Dietary Orders (From admission, onward)       Start     Ordered    08/30/21 1138  NPO Diet  Diet Effective Now      08/30/21 1141                    Windy Oneil, PharmD  10:17 EDT 9/4/2021

## 2021-09-04 NOTE — PROGRESS NOTES
" LOS: 14 days   Patient Care Team:  Provider, No Known as PCP - Ranulfo Marinelli MD as Consulting Physician (Nephrology)    Chief Complaint: Type B dissection     Subjective  Intubated, sedated    Vital Signs  Temp:  [98.8 °F (37.1 °C)-100.3 °F (37.9 °C)] 99.7 °F (37.6 °C)  Heart Rate:  [53-67] 67  Resp:  [11-30] 14  BP: (110-139)/(55-75) 120/55  Arterial Line BP: (112-161)/(47-70) 143/55  FiO2 (%):  [100 %] 100 %  Body mass index is 34.26 kg/m².    Intake/Output Summary (Last 24 hours) at 9/4/2021 0931  Last data filed at 9/4/2021 0634  Gross per 24 hour   Intake 2392 ml   Output 3770 ml   Net -1378 ml     No intake/output data recorded.          09/02/21  0519 09/02/21  0600 09/03/21  0453   Weight: 106 kg (232 lb 12.9 oz) 106 kg (232 lb 12.9 oz) 108 kg (238 lb 12.1 oz)         Objective:  General Appearance:  Comfortable.    Vital signs: (most recent): Blood pressure 118/54, pulse 64, temperature 99.4 °F (37.4 °C), temperature source Oral, resp. rate 15, height 177.8 cm (70\"), weight 108 kg (238 lb 12.1 oz), SpO2 (!) 88 %.    Heart: Normal rate.  Regular rhythm.    Skin:  Warm and dry.              Results Review:        WBC WBC   Date Value Ref Range Status   09/04/2021 29.70 (H) 3.40 - 10.80 10*3/mm3 Final   09/03/2021 28.70 (H) 3.40 - 10.80 10*3/mm3 Final   09/02/2021 22.20 (H) 3.40 - 10.80 10*3/mm3 Final      HGB Hemoglobin   Date Value Ref Range Status   09/04/2021 10.5 (L) 13.0 - 17.7 g/dL Final   09/03/2021 11.1 (L) 13.0 - 17.7 g/dL Final   09/02/2021 10.5 (L) 13.0 - 17.7 g/dL Final      HCT Hematocrit   Date Value Ref Range Status   09/04/2021 31.5 (L) 37.5 - 51.0 % Final   09/03/2021 32.2 (L) 37.5 - 51.0 % Final   09/02/2021 32.8 (L) 37.5 - 51.0 % Final      Platelets Platelets   Date Value Ref Range Status   09/04/2021 115 (L) 140 - 450 10*3/mm3 Final   09/03/2021 154 140 - 450 10*3/mm3 Final   09/02/2021 176 140 - 450 10*3/mm3 Final        PT/INR:  No results found for: PROTIME/No results " found for: INR    Sodium Sodium   Date Value Ref Range Status   09/04/2021 146 (H) 136 - 145 mmol/L Final   09/03/2021 149 (H) 136 - 145 mmol/L Final   09/03/2021 162 (C) 136 - 145 mmol/L Final   09/03/2021 161 (C) 136 - 145 mmol/L Final   09/02/2021 151 (H) 136 - 145 mmol/L Final      Potassium Potassium   Date Value Ref Range Status   09/04/2021 4.9 3.5 - 5.2 mmol/L Final     Comment:     Slight hemolysis detected by analyzer. Results may be affected.   09/03/2021 4.1 3.5 - 5.2 mmol/L Final   09/03/2021 4.2 3.5 - 5.2 mmol/L Final     Comment:     Slight hemolysis detected by analyzer. Results may be affected.   09/03/2021 3.8 3.5 - 5.2 mmol/L Final     Comment:     Slight hemolysis detected by analyzer. Results may be affected.   09/02/2021 4.4 3.5 - 5.2 mmol/L Final      Chloride Chloride   Date Value Ref Range Status   09/04/2021 113 (H) 98 - 107 mmol/L Final   09/03/2021 114 (H) 98 - 107 mmol/L Final   09/03/2021 130 (H) 98 - 107 mmol/L Final   09/03/2021 130 (H) 98 - 107 mmol/L Final   09/02/2021 118 (H) 98 - 107 mmol/L Final      Bicarbonate CO2   Date Value Ref Range Status   09/04/2021 20.0 (L) 22.0 - 29.0 mmol/L Final   09/03/2021 21.0 (L) 22.0 - 29.0 mmol/L Final   09/03/2021 22.0 22.0 - 29.0 mmol/L Final   09/03/2021 18.0 (L) 22.0 - 29.0 mmol/L Final   09/02/2021 21.0 (L) 22.0 - 29.0 mmol/L Final      BUN BUN   Date Value Ref Range Status   09/04/2021 91 (H) 6 - 20 mg/dL Final   09/03/2021 70 (H) 6 - 20 mg/dL Final   09/03/2021 88 (H) 6 - 20 mg/dL Final   09/03/2021 92 (H) 6 - 20 mg/dL Final   09/02/2021 101 (H) 6 - 20 mg/dL Final      Creatinine Creatinine   Date Value Ref Range Status   09/04/2021 2.30 (H) 0.76 - 1.27 mg/dL Final   09/03/2021 1.93 (H) 0.76 - 1.27 mg/dL Final   09/03/2021 1.84 (H) 0.76 - 1.27 mg/dL Final   09/03/2021 1.80 (H) 0.76 - 1.27 mg/dL Final   09/02/2021 1.89 (H) 0.76 - 1.27 mg/dL Final      Calcium Calcium   Date Value Ref Range Status   09/04/2021 8.3 (L) 8.6 - 10.5 mg/dL  Final   09/03/2021 8.4 (L) 8.6 - 10.5 mg/dL Final   09/03/2021 9.1 8.6 - 10.5 mg/dL Final   09/03/2021 9.2 8.6 - 10.5 mg/dL Final   09/02/2021 9.4 8.6 - 10.5 mg/dL Final      Magnesium Magnesium   Date Value Ref Range Status   09/04/2021 2.3 1.6 - 2.6 mg/dL Final   09/03/2021 2.7 (H) 1.6 - 2.6 mg/dL Final   09/02/2021 2.9 (H) 1.6 - 2.6 mg/dL Final      Troponin No results found for: TROPONIN   BNP No results found for: BNP         amLODIPine, 10 mg, Oral, Q24H  arformoterol, 15 mcg, Nebulization, BID - RT  budesonide, 0.5 mg, Nebulization, BID - RT  cloNIDine, 1 patch, Transdermal, Weekly  famotidine, 20 mg, Nasogastric, Daily  folic acid, 1 mg, Oral, Daily  hydrALAZINE, 100 mg, Nasogastric, Q8H  ipratropium-albuterol, 3 mL, Nebulization, Q4H - RT  methylPREDNISolone sodium succinate, 40 mg, Intravenous, Q8H  metoclopramide, 5 mg, Intravenous, Q8H  multivitamin, 1 tablet, Oral, Daily  piperacillin-tazobactam, 4.5 g, Intravenous, Q12H  polyethylene glycol, 17 g, Nasogastric, Daily  sildenafil, 20 mg, Oral, Q8H  sodium chloride, 3 mL, Intravenous, Q12H  thiamine, 100 mg, Nasogastric, Daily      Adult Central Clinimix TPN, , Last Rate: 63 mL/hr at 09/03/21 1759  clevidipine, 2-32 mg/hr, Last Rate: 15 mg/hr (09/04/21 0913)  fentanyl 10 mcg/mL,  mcg/hr, Last Rate: 200 mcg/hr (09/03/21 2336)  insulin, 1-20 Units/hr, Last Rate: 10 Units/hr (09/04/21 0920)  labetalol, 0.5-2 mg/min, Last Rate: Stopped (09/03/21 1600)  midazolam 1 mg/mL 100mL NS, 1-10 mg/hr, Last Rate: 5 mg/hr (09/03/21 3088)  Pharmacy to Dose TPN,   Pharmacy to Dose Zosyn,   sodium chloride, 100 mL/hr, Last Rate: 100 mL/hr (09/03/21 2116)        PRN Meds:.  acetaminophen    albumin human    alteplase 2 mg vial + sterile water for injection    bisacodyl    carboxymethylcellulose sod    clevidipine    dextrose    dextrose    dextrose    glucagon (human recombinant)    hydrALAZINE    LORazepam    ondansetron **OR** ondansetron    Pharmacy to Dose TPN     Pharmacy to Dose Zosyn    sodium chloride    vecuronium    Patient Active Problem List   Diagnosis Code    Tobacco abuse Z72.0    Hypertension I10    Aortic aneurysm and dissection (CMS/HCC) I71.00, I71.9    Medically noncompliant Z91.19    Dissection of aorta (CMS/[facility]) I71.00    Alcohol abuse F10.10    Acute respiratory failure with hypoxia (CMS/[facility]) J96.01    Pertussis pneumonia A37.91    VANESSA (acute kidney injury) N17.9    COVID-19 vaccine series declined Z28.21    Acute encephalopathy G93.40    Pneumonia due to infectious organism J18.9    ARDS (adult respiratory distress syndrome) J80    Hypertensive urgency I16.0       Assessment & Plan    -Acute type B dissection without mal perfusion--HTN control is key, repeat CTA chest/abdomen with stable dissection from left subclavian to SMA, true lumen supplies celiac and SMA arteries  -Pertussis and MSSA pneumonia----zithromax course completed, on Zosyn IV, COVID negative x2 (last test 8/29), ID following  -Respiratory failure----potential ARDS, on 100% FiO2 and high PEEP, pulmonary managing  -VANESSA with hypernatremia----HD initiated, renal following   -Untreated, accelerated HTN----titrate meds to keep sbp 120-130  -Sinus bradycardia----resolved  -Tobacco abuse--cessation   -ETOH abuse/withdrawal--acute, intubated, 15 beers/daily, CIWA protocol  -Non-compliance with medications--off BP meds x 5 years  -unresponsive off of sedation----CT head with chronic appearing pontine infarct and right frontoparietal nonspecific hypodensities, neurology recommends MRI  -Abdominal distention----general surgery following     Palliative care met with family yesterday, is now DNR status.  Will need to stop cleviprex, has reached lipid load, transition to cardene if needed.        Kassandra Sandra, DEVANTE  09/04/21  09:31 EDT  Addendum  Patient was seen and examined by me, I reviewed all the studies and interpreted them myself, agree with the findings above.  Neurologic status is noted.   Prognosis is poor.  Continue support  Efraní Todd MD

## 2021-09-04 NOTE — NURSING NOTE
Pt has not had BM since 8/26, Nicolasa Day, NP notified. Order for soap suds enema. Approx 500cc of soap suds enema given, pt sats dropped to 82%, pt unable to retain enema, liquid/stool coming out was brown in color. Dulcolax suppository and Relistor given per order. Will cont to monitor for results.

## 2021-09-05 NOTE — DISCHARGE SUMMARY
"PULMONARY/CRITICAL CARE DEATH SUMMARY    PATIENT NAME:     Geovanny Em  :     1975  MRN:     [id number]    ADMISSION DATE:  2021      DATE/TIME OF DEATH:    2021 at 6:57 PM     SMOKING STATUS: Current every day smoker at the time of admission     CAUSE OF DEATH  Acute respiratory failure with hypoxia secondary to ARDS  Pertussis pneumonia  MSSA pneumonia  Malignant hypertension    FINAL DIAGNOSES  Acute respiratory failure with hypoxia secondary to ARDS  Pertussis pneumonia  MSSA pneumonia  Malignant hypertension  Aortic aneurysm and dissection without rupture  Acute kidney injury  Alcohol abuse disorder  Medical noncompliance  Tobacco abuse disorder    HOSPITAL COURSE  The patient was a 46-year-old male who presented to the emergency department 2021 for evaluation of low back pain and chest pain.  He was a fairly poor historian however he was able to state that he woke up with the pain that morning and that it had no relieving factors.  He stated that the pain was a 3-5/10 and he described it as \"sore\".  He was unable to state if the pain radiates from front to back or radiated to any other areas of his body.  He denied that he had experienced nausea, vomiting, cough, expectoration, abdominal pain, diarrhea, diaphoresis, urinary hesitancy or pain with urination.  He had no aggravating factors for his discomfort.  He reported that he had previously been diagnosed with hypertension however after moving here 5 years ago he did not seek medical evaluation and has not been on prescription medications.  He denied previous cardiac history.  He reported that he was a smoker of 3/4 pack/day of cigarettes and had smoked for 30 years.  The patient initially denied routine alcohol consumption however his significant other later revealed that the patient consumed approximately 12-15 beers daily and had done so for a long period of time.     On evaluation in the emergency department the patient had an " SBP >200mm/Hg and he was placed on both Cardene drip and nitroglycerin drip.  Chest x-ray revealed enlargement of the aortic notch suggesting an aneurysm.  CT of the chest per PE protocol revealed an acute aortic dissection beginning near the level of the left subclavian artery and propagating inferiorly just past the level of the celiac artery measuring approximately 30 cm in length.  The patient was admitted to the ICU for further evaluation and treatment as well as aggressive blood pressure control..CV surgery was consulted to evaluate for surgical candidacy for repair of acute type B aortic dissection.  It was felt that the patient had good perfusion and surgery was not emergently indicated.    Overnight 8/22 the patient developed acute alcohol withdrawal with agitation and hypertensive emergency.  He required intubation for airway stabilization as well as further aggressive medical management.  The patient was negative for COVID-19 infection however was positive for Bordetella pertussis.  He required high ventilator settings and inhaled Flolan for refractory hypoxemia.  His hypertension proved very hard to manage and he required multiple vasoactive IV medications throughout his stay.  Cardiology was consulted for patient chest pain.  Nephrology was consulted for acute kidney injury.  Plan for blood pressure control keep SBP around 140.  Patient was on esmolol but was convinced continue for bradycardia.  Patient underwent echocardiogram which showed EF of 56 to 60%.    8/23: Intubated, sedated. Patient underwent bronchoscopy.  Patient started on Flolan for refractory hypoxia.  Patient requiring high vent settings of PEEP of 10, FiO2 100%.  Was on Cleviprex which was discontinued for worsening hypoxia for possible intrapulmonary shunting.    8/24: Intubated, sedated.  Patient required resedation and Norcuron pushes for agitation and worsening hypoxia.  Cardene GTT was started overnight for blood pressure  "control.    8/25: Intubated, sedated. Sputum culture pulmonary results with 2+ staph aureus.  Neurology was consulted for neurological evaluation for mental status change.  Transitioned from Cardene to labetalol gtt with concern for intrapulmonary shunting.  CT head was completed and showed \"Patchy hypodensities in the right frontoparietal junction in the right parietal lobe which are nonspecific and could reflect changes of age-indeterminate infarcts/chronic microvascular disease.  Chronic appearing pontine infarct.  Extensive paranasal sinus disease.  Partial bilateral mastoid effusions\".      8/26: Intubated, sedated.    8/27: Intubated, sedated.  Patient underwent second bronchoscopy.  PEEP increased to 13.  Nutrition was consulted for tube feed recommendations.    8/28: Intubated, sedated.  On Flolan.  Requiring high vent settings with PEEP of 15 and FiO2 100%.    8/29: Intubated, sedated. General surgery was consulted for assessment of possible abdominal compartment syndrome.  CT of abdomen ordered for evaluation, however patient was too unstable.  Unable to  if surgical intervention would be helpful without CT scan.  Infectious disease was consulted for patient sepsis.    8/30: Intubated, sedated.  Continued as needed paralytic for increased hypoxia.    8/31: Intubated, sedated. Started on high-dose steroids with hopes to wean off ventilator.  Started on Cleviprex per CTS, patient did not tolerate beta-blockers.  Patient underwent a third bronchoscopy.  Bedside echo with bubble study negative.  Patient was retested for COVID-19 and negative.    9/1: Intubated, sedated.  Patient was started on TPN.    9/2: Intubated, sedated. Tempted to wean Flolan.  Nontunneled catheter was placed to start CRRT per nephrology.    9/3: Intubated, sedated. Palliative care was consulted.  Concerned for arts, likely complicated by volume overload.  Patient started on CRRT as he was not diuresing well.    9/4: Intubated, " sedated. Patient underwent fourth bronchoscopy.  Patient static continued to decline multiple discussions on goals of care.  Family made patient DNR and considering comfort measures.      On 9/4/2021 patient withdrew care at 1840.  She was terminally extubated.    Time of death noted to be on 9/4/2021 at 6:57 PM .  Emotional support provided to family.    PROCEDURES PERFORMED  Procedure(s):  BRONCHOSCOPY AT BEDSIDE with bilateral bronchial washing  09/04 1646 BRONCHOSCOPY  09/02 1100 Non-Tunneled Catheter    CONSULTING PHYSICIANS  Consults     Date and Time Order Name Status Description    8/29/2021  4:23 PM Inpatient Infectious Diseases Consult Completed     8/25/2021 10:33 AM Inpatient Neurology Consult General Completed     8/22/2021 11:29 AM Inpatient Nephrology Consult Completed     8/22/2021  8:39 AM Inpatient Cardiology Consult Completed     8/21/2021  4:35 AM Inpatient Cardiothoracic Surgery Consult Completed     8/21/2021  4:29 AM Inpatient Vascular Surgery Consult Completed           RESULTS REVIEW  LABS  Lab Results (last 24 hours)     Procedure Component Value Units Date/Time    POC Glucose Once [[id number]]  (Abnormal) Collected: 09/04/21 0204    Specimen: Blood Updated: 09/04/21 0205     Glucose 223 mg/dL      Comment: Serial Number: [id number]:  [id number]       POC Glucose Once [[id number]]  (Abnormal) Collected: 09/04/21 0302    Specimen: Blood Updated: 09/04/21 0303     Glucose 206 mg/dL      Comment: Serial Number: [id number]:  [id number]       Blood Gas, Arterial - [[id number]]  (Abnormal) Collected: 09/04/21 0338    Specimen: Arterial Blood Updated: 09/04/21 0351     Site Arterial Line     Asher's Test N/A     pH, Arterial 7.360 pH units      pCO2, Arterial 37.8 mm Hg      pO2, Arterial 197.2 mm Hg      HCO3, Arterial 21.4 mmol/L      Base Excess, Arterial -3.7 mmol/L      Comment: Serial Number: [id number]:  [id number]        O2 Saturation, Arterial 99.7 %      CO2 Content 22.5 mmol/L       Barometric Pressure for Blood Gas --     Comment: N/A        Modality Aerosol Mask     FIO2 100 %      Ventilator Mode ;PC     PEEP 17     Hemodilution No     Respiratory Rate 30    CBC & Differential [[id number]]  (Abnormal) Collected: 09/04/21 0359    Specimen: Blood Updated: 09/04/21 0431    Narrative:      The following orders were created for panel order CBC & Differential.  Procedure                               Abnormality         Status                     ---------                               -----------         ------                     CBC Auto Differential[[id number]]        Abnormal            Final result               Scan Slide[[id number]]                                       Final result                 Please view results for these tests on the individual orders.    Magnesium [[id number]]  (Normal) Collected: 09/04/21 0359    Specimen: Blood Updated: 09/04/21 0430     Magnesium 2.3 mg/dL     Comprehensive Metabolic Panel [[id number]]  (Abnormal) Collected: 09/04/21 0359    Specimen: Blood Updated: 09/04/21 0435     Glucose 222 mg/dL      BUN 91 mg/dL      Creatinine 2.30 mg/dL      Sodium 146 mmol/L      Potassium 4.9 mmol/L      Comment: Slight hemolysis detected by analyzer. Results may be affected.        Chloride 113 mmol/L      CO2 20.0 mmol/L      Calcium 8.3 mg/dL      Total Protein 5.6 g/dL      Albumin 2.90 g/dL      ALT (SGPT) 81 U/L      AST (SGOT) 29 U/L      Comment: Slight hemolysis detected by analyzer. Results may be affected.        Alkaline Phosphatase 101 U/L      Total Bilirubin 1.7 mg/dL      eGFR Non African Amer 31 mL/min/1.73      Globulin 2.7 gm/dL      A/G Ratio 1.1 g/dL      BUN/Creatinine Ratio 39.6     Anion Gap 13.0 mmol/L     Narrative:      GFR Normal >60  Chronic Kidney Disease <60  Kidney Failure <15      Phosphorus [[id number]]  (Abnormal) Collected: 09/04/21 0359    Specimen: Blood Updated: 09/04/21 0435     Phosphorus 6.0 mg/dL     Calcium, Ionized  [[id number]]  (Normal) Collected: 09/04/21 0359    Specimen: Blood Updated: 09/04/21 0418     Ionized Calcium 1.24 mmol/L     CBC Auto Differential [[id number]]  (Abnormal) Collected: 09/04/21 0359    Specimen: Blood Updated: 09/04/21 0431     WBC 29.70 10*3/mm3      RBC 3.23 10*6/mm3      Hemoglobin 10.5 g/dL      Hematocrit 31.5 %      MCV 97.7 fL      MCH 32.4 pg      MCHC 33.2 g/dL      RDW 14.9 %      RDW-SD 51.2 fl      MPV 10.0 fL      Platelets 115 10*3/mm3     Narrative:      The previously reported component NRBC is no longer being reported. Previous result was 0.1 /100 WBC (Reference Range: 0.0-0.2 /100 WBC) on 9/4/2021 at 0409 EDT.    Scan Slide [[id number]] Collected: 09/04/21 0359    Specimen: Blood Updated: 09/04/21 0431     Scan Slide --     Comment: See Manual Differential Results       Manual Differential [[id number]]  (Abnormal) Collected: 09/04/21 0359    Specimen: Blood Updated: 09/04/21 0432     Neutrophil % 79.0 %      Lymphocyte % 1.0 %      Monocyte % 4.0 %      Bands %  14.0 %      Metamyelocyte % 1.0 %      Myelocyte % 1.0 %      Neutrophils Absolute 27.62 10*3/mm3      Lymphocytes Absolute 0.30 10*3/mm3      Monocytes Absolute 1.19 10*3/mm3      RBC Morphology Normal     Vacuolated Neutrophils Slight/1+     Large Platelets Slight/1+    POC Glucose Once [[id number]]  (Abnormal) Collected: 09/04/21 0540    Specimen: Blood Updated: 09/04/21 0541     Glucose 208 mg/dL      Comment: Serial Number: [id number]:  [id number]       POC Glucose Once [[id number]]  (Abnormal) Collected: 09/04/21 0746    Specimen: Blood Updated: 09/04/21 0746     Glucose 206 mg/dL      Comment: Serial Number: [id number]:  [id number]       POC Glucose Once [[id number]]  (Abnormal) Collected: 09/04/21 1138    Specimen: Blood Updated: 09/04/21 1139     Glucose 217 mg/dL      Comment: Serial Number: [id number]:  [id number]       Pneumocystis PCR - Wash, Bronchus [[id number]] Collected: 09/04/21 1342    Specimen:  Wash from Bronchus Updated: 09/04/21 1424    Fungus Culture - Wash, Bronchus [[id number]] Collected: 09/04/21 1342    Specimen: Wash from Bronchus Updated: 09/04/21 1420    Aspergillus Galactomannan Antigen [[id number]] Collected: 09/04/21 1342    Specimen: Wash from Bronchus Updated: 09/04/21 1419    AFB Culture - Wash, Bronchus [[id number]] Collected: 09/04/21 1342    Specimen: Wash from Bronchus Updated: 09/04/21 1420    Respiratory Culture - Wash, Bronchus [[id number]] Collected: 09/04/21 1342    Specimen: Wash from Bronchus Updated: 09/04/21 1420    Respiratory Panel, PCR (WITHOUT COVID) - Wash, Bronchus [[id number]]  (Normal) Collected: 09/04/21 1342    Specimen: Wash from Bronchus Updated: 09/04/21 1906     ADENOVIRUS, PCR Not Detected     Coronavirus 229E Not Detected     Coronavirus HKU1 Not Detected     Coronavirus NL63 Not Detected     Coronavirus OC43 Not Detected     Human Metapneumovirus Not Detected     Human Rhinovirus/Enterovirus Not Detected     Influenza B PCR Not Detected     Parainfluenza Virus 1 Not Detected     Parainfluenza Virus 2 Not Detected     Parainfluenza Virus 3 Not Detected     Parainfluenza Virus 4 Not Detected     Bordetella pertussis pcr Not Detected     Influenza A H1 2009 PCR Not Detected     Chlamydophila pneumoniae PCR Not Detected     Mycoplasma pneumo by PCR Not Detected     Influenza A PCR Not Detected     Influenza A H3 Not Detected     Influenza A H1 Not Detected     RSV, PCR Not Detected     Bordetella parapertussis PCR Not Detected    Narrative:      The coronavirus on the RVP is NOT COVID-19 and is NOT indicative of infection with COVID-19.    In the setting of a positive respiratory panel with a viral infection PLUS a negative procalcitonin without other underlying concern for bacterial infection, consider observing off antibiotics or discontinuation of antibiotics and continue supportive care. If the respiratory panel is positive for atypical bacterial infection  (Bordetella pertussis, Chlamydophila pneumoniae, or Mycoplasma pneumoniae), consider antibiotic de-escalation to target atypical bacterial infection.    Histoplasma Antigen, CSF or BAL - Wash, Bronchus [[id number]] Collected: 09/04/21 1342    Specimen: Wash from Bronchus Updated: 09/04/21 1420    Cytomegalovirus (CMV) By PCR - Wash, Bronchus [[id number]] Collected: 09/04/21 1342    Specimen: Wash from Bronchus Updated: 09/04/21 1420    Protime-INR [[id number]]  (Abnormal) Collected: 09/04/21 1510    Specimen: Blood Updated: 09/04/21 1527     Protime 12.9 Seconds      INR 1.18    POC Glucose Once [[id number]]  (Abnormal) Collected: 09/04/21 1636    Specimen: Blood Updated: 09/04/21 1637     Glucose 169 mg/dL      Comment: Serial Number: [id number]:  [id number]       POC Glucose Once [[id number]]  (Abnormal) Collected: 09/04/21 1745    Specimen: Blood Updated: 09/04/21 1747     Glucose 164 mg/dL      Comment: Serial Number: [id number]:  [id number]             MICRO:  All cultures reviewed and negative, or pending with no growth unless otherwise designated in chart below.  Microbiology Results Abnormal     Procedure Component Value - Date/Time    Respiratory Panel, PCR (WITHOUT COVID) - Wash, Bronchus [[id number]]  (Normal) Collected: 09/04/21 1342    Lab Status: Final result Specimen: Wash from Bronchus Updated: 09/04/21 1906     ADENOVIRUS, PCR Not Detected     Coronavirus 229E Not Detected     Coronavirus HKU1 Not Detected     Coronavirus NL63 Not Detected     Coronavirus OC43 Not Detected     Human Metapneumovirus Not Detected     Human Rhinovirus/Enterovirus Not Detected     Influenza B PCR Not Detected     Parainfluenza Virus 1 Not Detected     Parainfluenza Virus 2 Not Detected     Parainfluenza Virus 3 Not Detected     Parainfluenza Virus 4 Not Detected     Bordetella pertussis pcr Not Detected     Influenza A H1 2009 PCR Not Detected     Chlamydophila pneumoniae PCR Not Detected     Mycoplasma pneumo by  PCR Not Detected     Influenza A PCR Not Detected     Influenza A H3 Not Detected     Influenza A H1 Not Detected     RSV, PCR Not Detected     Bordetella parapertussis PCR Not Detected    Narrative:      The coronavirus on the RVP is NOT COVID-19 and is NOT indicative of infection with COVID-19.    In the setting of a positive respiratory panel with a viral infection PLUS a negative procalcitonin without other underlying concern for bacterial infection, consider observing off antibiotics or discontinuation of antibiotics and continue supportive care. If the respiratory panel is positive for atypical bacterial infection (Bordetella pertussis, Chlamydophila pneumoniae, or Mycoplasma pneumoniae), consider antibiotic de-escalation to target atypical bacterial infection.    Blood Culture - Blood, Hand, Right [[id number]] Collected: 08/30/21 1415    Lab Status: Final result Specimen: Blood from Hand, Right Updated: 09/04/21 1431     Blood Culture No growth at 5 days    Blood Culture - Blood, Blood, Central Line [[id number]] Collected: 08/30/21 1333    Lab Status: Final result Specimen: Blood, Central Line Updated: 09/04/21 1345     Blood Culture No growth at 5 days    Cytomegalovirus (CMV) By PCR - Wash, Bronchus [[id number]] Collected: 08/31/21 0909    Lab Status: Final result Specimen: Wash from Bronchus Updated: 09/03/21 2308     Specimen Source WASH     Cytomegalovirus PCR Negative     Comment: -------------------ADDITIONAL INFORMATION-------------------  This test was developed and its performance characteristics  determined by [facility] in a manner consistent with CLIA  requirements. This test has not been cleared or approved by  the U.S. Food and Drug Administration.       Narrative:      Performed at:  01 - [facility]  [address], [place]  [id number]  : Rahul Recinos , Phone:  [phone]    Histoplasma Antigen, CSF or BAL - Wash, Bronchus [[id number]] Collected:  "08/31/21 0909    Lab Status: Final result Specimen: Wash from Bronchus Updated: 09/03/21 0713     Result None Detected ng/mL      Comment:                                           None Detected        Interpretation Negative     Comment: Positive results reported in ng/mL from 0.20 ng/mL to 20.00 ng/mL  Positive results above 20.00 ng/mL are reported as \"Above the  Limit of Quantification\"       Narrative:      Performed at:  [address] [place] [address] [facility] [address] [place]  [id number]  : Al Heredia MD, Phone:  [phone]    Respiratory Culture - Wash, Bronchus [[id number]] Collected: 08/31/21 0909    Lab Status: Final result Specimen: Wash from Bronchus Updated: 09/02/21 1132     Respiratory Culture No growth     Gram Stain Few (2+) WBCs per low power field      Rare (1+) Mixed bacterial morphotypes seen on Gram Stain    COVID-19,[facility] ROUTINE, NP/OP SWAB IN TRANSPORT MEDIA OR ESWAB 72 HR TAT - Wash, Bronchus [[id number]] Collected: 08/31/21 0909    Lab Status: Final result Specimen: Wash from Bronchus Updated: 09/02/21 0338     SARS-CoV-2, DISHA Not Detected     Comment: This nucleic acid amplification test was developed and its performance  characteristics determined by [employer]. Nucleic acid  amplification tests include RT-PCR and TMA. This test has not been  FDA cleared or approved. This test has been authorized by FDA under  an Emergency Use Authorization (EUA). This test is only authorized  for the duration of time the declaration that circumstances exist  justifying the authorization of the emergency use of in vitro  diagnostic tests for detection of SARS-CoV-2 virus and/or diagnosis  of COVID-19 infection under section 564(b)(1) of the Act, 21 U.S.C.  360bbb-3(b) (1), unless the authorization is terminated or revoked  sooner.  When diagnostic testing is negative, the possibility of a false  negative result should be considered in the context of a patient's  recent " exposures and the presence of clinical signs and symptoms  consistent with COVID-19. An individual without symptoms of COVID-19  and who is not shedding SARS-CoV-2 virus would expect to have a  negative (not detected) result in this assay.       Narrative:      Performed at:  01 - [facility]  [address], [place], [place]  [id number]  : Susannah Tesfaye [facility], Phone:  [phone]    AFB Culture - Wash, Bronchus [[id number]] Collected: 08/31/21 0909    Lab Status: Preliminary result Specimen: Wash from Bronchus Updated: 09/01/21 1221     AFB Stain No acid fast bacilli seen    Respiratory Panel, PCR (WITHOUT COVID) - Wash, Bronchus [[id number]]  (Normal) Collected: 08/31/21 0909    Lab Status: Final result Specimen: Wash from Bronchus Updated: 08/31/21 1056     ADENOVIRUS, PCR Not Detected     Coronavirus 229E Not Detected     Coronavirus HKU1 Not Detected     Coronavirus NL63 Not Detected     Coronavirus OC43 Not Detected     Human Metapneumovirus Not Detected     Human Rhinovirus/Enterovirus Not Detected     Influenza B PCR Not Detected     Parainfluenza Virus 1 Not Detected     Parainfluenza Virus 2 Not Detected     Parainfluenza Virus 3 Not Detected     Parainfluenza Virus 4 Not Detected     Bordetella pertussis pcr Not Detected     Chlamydophila pneumoniae PCR Not Detected     Mycoplasma pneumo by PCR Not Detected     Influenza A PCR Not Detected     RSV, PCR Not Detected     Bordetella parapertussis PCR Not Detected    Narrative:      The coronavirus on the RVP is NOT COVID-19 and is NOT indicative of infection with COVID-19.    In the setting of a positive respiratory panel with a viral infection PLUS a negative procalcitonin without other underlying concern for bacterial infection, consider observing off antibiotics or discontinuation of antibiotics and continue supportive care. If the respiratory panel is positive for atypical bacterial infection (Bordetella pertussis, Chlamydophila pneumoniae, or  Mycoplasma pneumoniae), consider antibiotic de-escalation to target atypical bacterial infection.    AFB Culture - Wash, Bronchus [[id number]] Collected: 08/23/21 1131    Lab Status: Preliminary result Specimen: Wash from Bronchus Updated: 08/30/21 1300     AFB Culture No AFB isolated at 1 week     AFB Stain No acid fast bacilli seen    Blood Culture - Blood, Blood, Arterial Line [[id number]] Collected: 08/25/21 0054    Lab Status: Final result Specimen: Blood, Arterial Line Updated: 08/30/21 0115     Blood Culture No growth at 5 days    Blood Culture - Blood, Blood, Central Line [[id number]] Collected: 08/25/21 0054    Lab Status: Final result Specimen: Blood, Central Line Updated: 08/30/21 0115     Blood Culture No growth at 5 days    Respiratory Panel PCR w/COVID-19(SARS-CoV-2) MARY KAY/KERRIE/MALENA/PAD/COR/MAD/BUZZ In-House, NP Swab in UTM/VTM, 3-4 HR TAT - Swab, Nasopharynx [[id number]]  (Normal) Collected: 08/29/21 1246    Lab Status: Final result Specimen: Swab from Nasopharynx Updated: 08/29/21 1355     ADENOVIRUS, PCR Not Detected     Coronavirus 229E Not Detected     Coronavirus HKU1 Not Detected     Coronavirus NL63 Not Detected     Coronavirus OC43 Not Detected     COVID19 Not Detected     Human Metapneumovirus Not Detected     Human Rhinovirus/Enterovirus Not Detected     Influenza A PCR Not Detected     Influenza B PCR Not Detected     Parainfluenza Virus 1 Not Detected     Parainfluenza Virus 2 Not Detected     Parainfluenza Virus 3 Not Detected     Parainfluenza Virus 4 Not Detected     RSV, PCR Not Detected     Bordetella pertussis pcr Not Detected     Bordetella parapertussis PCR Not Detected     Chlamydophila pneumoniae PCR Not Detected     Mycoplasma pneumo by PCR Not Detected    Narrative:      In the setting of a positive respiratory panel with a viral infection PLUS a negative procalcitonin without other underlying concern for bacterial infection, consider observing off antibiotics or discontinuation of  "antibiotics and continue supportive care. If the respiratory panel is positive for atypical bacterial infection (Bordetella pertussis, Chlamydophila pneumoniae, or Mycoplasma pneumoniae), consider antibiotic de-escalation to target atypical bacterial infection.    S. Pneumo Ag Urine or CSF - Urine, Urine, Catheter [[id number]]  (Normal) Collected: 08/29/21 1246    Lab Status: Final result Specimen: Urine, Catheter Updated: 08/29/21 1315     Strep Pneumo Ag Negative    Legionella Antigen, Urine - Urine, Urine, Catheter [[id number]]  (Normal) Collected: 08/29/21 1246    Lab Status: Final result Specimen: Urine, Catheter Updated: 08/29/21 1315     LEGIONELLA ANTIGEN, URINE Negative    Pneumocystis PCR - Wash, Bronchus [[id number]] Collected: 08/23/21 1131    Lab Status: Final result Specimen: Wash from Bronchus Updated: 08/27/21 1111     Pneumocystis jiroveci DNA Negative     Comment: -------------------ADDITIONAL INFORMATION-------------------  This test was developed and its performance characteristics  determined by [facility] in a manner consistent with CLIA  requirements. This test has not been cleared or approved by  the U.S. Food and Drug Administration.  REFERENCE RANGE: Not Applicable        Specimen Source Comment     Comment: BRONCH WASH       Narrative:      Performed at:  01 - [facility] [address]  [id number]  : Rahul Recinos , Phone:  [phone]    Histoplasma Antigen, CSF or BAL - Wash, Bronchus [[id number]] Collected: 08/23/21 1131    Lab Status: Final result Specimen: Wash from Bronchus Updated: 08/26/21 1710     Result None Detected ng/mL      Comment:                                           None Detected        Interpretation Negative     Comment: Positive results reported in ng/mL from 0.20 ng/mL to 20.00 ng/mL  Positive results above 20.00 ng/mL are reported as \"Above the  Limit of Quantification\"       Narrative:      Performed at:  01 - " [employer]  [address] [facility] [place]  [id number]  : Al Heredia MD, Phone:  [phone]    Cytomegalovirus (CMV) By PCR - Wash, Bronchus [[id number]] Collected: 08/23/21 1131    Lab Status: Final result Specimen: Wash from Bronchus Updated: 08/26/21 0209     Specimen Source Comment     Comment: BRONCH WASH CARBOWAX        Cytomegalovirus PCR Negative     Comment: -------------------ADDITIONAL INFORMATION-------------------  This test was developed and its performance characteristics  determined by [facility] in a manner consistent with CLIA  requirements. This test has not been cleared or approved by  the U.S. Food and Drug Administration.       Narrative:      Performed at:  01 - [facility] [address]  [id number]  : Rahul Recinos , Phone:  [phone]    Respiratory Culture - Wash, Bronchus [[id number]] Collected: 08/23/21 1131    Lab Status: Final result Specimen: Wash from Bronchus Updated: 08/25/21 1230     Respiratory Culture Light growth (2+) Normal Respiratory Muna     Gram Stain Moderate (3+) WBCs per low power field      Moderate (3+) Gram positive cocci in pairs and chains      Rare (1+) Gram positive cocci in clusters    MRSA Screen, PCR (Inpatient) - Swab, Nares [[id number]]  (Normal) Collected: 08/21/21 1013    Lab Status: Final result Specimen: Swab from Nares Updated: 08/21/21 1130     MRSA PCR No MRSA Detected    COVID-19,CEPHEID/SANAZ/BDMAX,COR/MALENA/PAD/PORSCHE IN-HOUSE(OR EMERGENT/ADD-ON),NP SWAB IN TRANSPORT MEDIA 3-4 HR TAT, RT-PCR - Swab, Nasopharynx [[id number]]  (Normal) Collected: 08/21/21 0234    Lab Status: Final result Specimen: Swab from Nasopharynx Updated: 08/21/21 0334     COVID19 Not Detected    Narrative:      Fact sheet for providers: https://www.fda.gov/media/796951/download     Fact sheet for patients: https://www.fda.gov/media/927889/download  Fact sheet for providers:  https://www.fda.gov/media/964626/download     Fact sheet for patients: https://www.fda.gov/media/292650/download             RADIOLOGY:  XR Chest 1 View    Result Date: 9/4/2021   1. Stable support tubes and lines. 2. Decreased opacity in the upper left lung, likely improvement pneumonia, with otherwise similar-appearing multifocal lung opacities.  Electronically Signed By-Timothy Briones MD On:9/4/2021 10:08 AM This report was finalized on [id number] by  Timothy Briones MD.    XR Chest 1 View    Result Date: 9/3/2021   1. Worsening right upper lobe airspace disease.  No interval change in the bilateral mixed interstitial/airspace disease which can be seen with ARDS, multifocal pneumonia, or pulmonary edema. 2. The nasogastric tube tip terminates within the fundus of the stomach just below the GE junction. I would recommend advancing the nasogastric tube approximately 5 to 10 cm. 3. New small right pleural effusion.  Electronically Signed By-Chirag Nguyen MD On:9/3/2021 2:50 PM This report was finalized on [id number] by  Chirag Nguyen MD.    XR Chest 1 View    Result Date: 9/2/2021  Diffuse bilateral infiltrates persist greater on the left than the right. Aeration is similar to the prior study. Tubes and lines appear properly positioned.  Electronically Signed By-Roel Morin MD On:9/2/2021 10:43 AM This report was finalized on [id number] by  Roel Morin MD.    XR Chest 1 View    Result Date: 9/1/2021  1.Endotracheal tube in good position. 2.Patchy bilateral airspace consolidations appear slightly improved on the right, which could reflect pneumonia, pulmonary edema, or ARDS.  Electronically Signed By-Shahriar Sutton MD On:9/1/2021 7:52 AM This report was finalized on [id number] by  Shahriar Sutton MD.    XR Chest 1 View    Result Date: 8/31/2021  1.Endotracheal tube in good position. 2.Diffuse bilateral airspace consolidations appear slightly improved on the right, which could represent pneumonia,  pulmonary edema, or ARDS.  Electronically Signed By-Shahriar Sutton MD On:8/31/2021 7:44 AM This report was finalized on [id number] by  Shahriar Sutton MD.    XR Chest 1 View    Result Date: 8/29/2021  Worsening bilateral mixed interstitial/airspace disease which can be seen with pneumonia, pulmonary edema, or ARDS.  Electronically Signed By-Chirag Nguyen MD On:8/29/2021 10:12 AM This report was finalized on [id number] by  Chirag Nguyen MD.    XR Chest 1 View    Result Date: 8/28/2021  No interval change from yesterday's exam with abnormalities as described above.  Electronically Signed By-Chirag Nguyen MD On:8/28/2021 9:52 AM This report was finalized on [id number] by  Chirag Nguyen MD.    US Abdomen Limited    Result Date: 8/29/2021  Scant abdominal and pelvic ascites as noted above.  Electronically Signed By-Chirag Nguyen MD On:8/29/2021 1:14 PM This report was finalized on [id number] by  Chirag Nguyen MD.    XR Abdomen KUB    Result Date: 9/4/2021  Limited examination. The overall gas pattern is nonspecific. Nasoenteric tube is in the stomach. No distended loops of bowel are identified.  Electronically Signed By-Kahlil Louis MD On:9/4/2021 1:14 PM This report was finalized on [id number] by  Kahlil Louis MD.    XR Abdomen KUB    Result Date: 8/28/2021   1. The tip of the nasogastric tube terminates within the body of the stomach. 2. Unremarkable bowel gas pattern.  Electronically Signed By-Chirag Nguyen MD On:8/28/2021 3:11 PM This report was finalized on [id number] by  Chirag Nguyen MD.      For more specific information regarding this patient’s hospital stay at [facility], please refer to patient’s full medical record.  This death summary has been scribed by this nurse practitioner for the attending physician, Dr. Carter      I personally have examined  and interviewed the patient. I have reviewed the history, data, problems, assessment and plan with our NP.  Critical care time in direct  medical management (   ) minutes  Electronically signed by Kayla Carter MD, D,ABS, 09/05/21, 11:59 PM EDT.

## 2021-09-05 NOTE — NURSING NOTE
Family withdrew care approximately around 1840. Patient passed at 1857. Significant other, mother, son, daughter, and other family members present. [facility]  home to come  patient.

## 2021-09-06 LAB
BACTERIA SPEC RESP CULT: NORMAL
GRAM STN SPEC: NORMAL
GRAM STN SPEC: NORMAL

## 2021-09-06 NOTE — PROCEDURES
US guided [facility] note     PATIENT IDENTIFICATION:  Name: Geovanny Em  Age: 46 y.o.  Sex: male  :  1975  MRN: [id number]    DATE OF CONSULTATION:  2021    Indication:  poor IV access needs pressors  Location: RT IJ  Procedure: informed consent obtained, then sterilization of the above area, draping per protocol of the kit, US used to localize the vein location and differentiate from the artery , then Lidocaine 1% 4 cc in that area then with US guided catheter needle in vein,  Dark blood return, then wire in then dilatation , then triple lumen cathter arrow brand catheter in the Internal Jugular vein   good flush, wire out ,   Sutured per kit protocol,  Dressed. Chest X ray to follow.       Electronically signed by Kayla Carter MD, 21, 11:56 PM EDT.

## 2021-09-07 LAB — GALACTOMANNAN AG SPEC IA-ACNC: 0.11 INDEX (ref 0–0.49)

## 2021-09-07 NOTE — PAYOR COMM NOTE
"This is discharge notice for Santiago Em   Reference/Auth # [id number]  Pt  on 21.    Roselyn Candelario RN, BSN  Utilization Review Nurse  [facility]  Direct & confidential phone # [phone]  Fax # [phone]      Santiago Em (Dcsd. Male)     Date of Birth Social Security Number Address Home Phone MRN    1975  [address] [place] [address] [phone] [id number]    Presybeterian Marital Status          None Unknown       Admission Date Admission Type Admitting Provider Attending Provider Department, Room/Bed    21 Emergency Draw, MD Ruibn  [facility],     Discharge Date Discharge Disposition Discharge Destination        2021               Attending Provider: (none)   Allergies: No Known Allergies    Isolation: None   Infection: None   Code Status: Prior    Ht: 177.8 cm (70\")   Wt: 108 kg (238 lb 12.1 oz)    Admission Cmt: None   Principal Problem: Aortic aneurysm and dissection (CMS/HCC) [I71.00,I71.9]                 Active Insurance as of 2021     Primary Coverage     Payor Plan Insurance Group Employer/Plan Group    [employer] COMMERCIAL AETNA [id number]     Payor Plan Address Payor Plan Phone Number Payor Plan Fax Number Effective Dates    [address] [phone]  2010 - None Entered    [facility] [id number]       Subscriber Name Subscriber Birth Date Member ID       SANTIAGO EM 1975 [id number]                 Emergency Contacts      (Rel.) Home Phone Work Phone Mobile Phone    Cata Cochran (aunt) (Relative) [phone] -- --    Telma Chávez (Mother) [phone] -- --    Delphine Mercer (Significant Other) [phone] -- --    Maria AntoniaJuliette (Daughter) [phone] -- --    Maria Antonia Jose G (Son) [phone] -- --               Discharge Summary      Kayla Carter MD at 21 1857          PULMONARY/CRITICAL CARE DEATH SUMMARY    PATIENT " "NAME:     Geovanny Em  :     1975  MRN:     [id number]    ADMISSION DATE:  2021      DATE/TIME OF DEATH:    2021 at 6:57 PM     SMOKING STATUS: Current every day smoker at the time of admission     CAUSE OF DEATH  Acute respiratory failure with hypoxia secondary to ARDS  Pertussis pneumonia  MSSA pneumonia  Malignant hypertension    FINAL DIAGNOSES  Acute respiratory failure with hypoxia secondary to ARDS  Pertussis pneumonia  MSSA pneumonia  Malignant hypertension  Aortic aneurysm and dissection without rupture  Acute kidney injury  Alcohol abuse disorder  Medical noncompliance  Tobacco abuse disorder    HOSPITAL COURSE  The patient was a 46-year-old male who presented to the emergency department 2021 for evaluation of low back pain and chest pain.  He was a fairly poor historian however he was able to state that he woke up with the pain that morning and that it had no relieving factors.  He stated that the pain was a 3-5/10 and he described it as \"sore\".  He was unable to state if the pain radiates from front to back or radiated to any other areas of his body.  He denied that he had experienced nausea, vomiting, cough, expectoration, abdominal pain, diarrhea, diaphoresis, urinary hesitancy or pain with urination.  He had no aggravating factors for his discomfort.  He reported that he had previously been diagnosed with hypertension however after moving here 5 years ago he did not seek medical evaluation and has not been on prescription medications.  He denied previous cardiac history.  He reported that he was a smoker of 3/4 pack/day of cigarettes and had smoked for 30 years.  The patient initially denied routine alcohol consumption however his significant other later revealed that the patient consumed approximately 12-15 beers daily and had done so for a long period of time.     On evaluation in the emergency department the patient had an SBP >200mm/Hg and he was placed on both Cardene " drip and nitroglycerin drip.  Chest x-ray revealed enlargement of the aortic notch suggesting an aneurysm.  CT of the chest per PE protocol revealed an acute aortic dissection beginning near the level of the left subclavian artery and propagating inferiorly just past the level of the celiac artery measuring approximately 30 cm in length.  The patient was admitted to the ICU for further evaluation and treatment as well as aggressive blood pressure control..CV surgery was consulted to evaluate for surgical candidacy for repair of acute type B aortic dissection.  It was felt that the patient had good perfusion and surgery was not emergently indicated.    Overnight 8/22 the patient developed acute alcohol withdrawal with agitation and hypertensive emergency.  He required intubation for airway stabilization as well as further aggressive medical management.  The patient was negative for COVID-19 infection however was positive for Bordetella pertussis.  He required high ventilator settings and inhaled Flolan for refractory hypoxemia.  His hypertension proved very hard to manage and he required multiple vasoactive IV medications throughout his stay.  Cardiology was consulted for patient chest pain.  Nephrology was consulted for acute kidney injury.  Plan for blood pressure control keep SBP around 140.  Patient was on esmolol but was convinced continue for bradycardia.  Patient underwent echocardiogram which showed EF of 56 to 60%.    8/23: Intubated, sedated. Patient underwent bronchoscopy.  Patient started on Flolan for refractory hypoxia.  Patient requiring high vent settings of PEEP of 10, FiO2 100%.  Was on Cleviprex which was discontinued for worsening hypoxia for possible intrapulmonary shunting.    8/24: Intubated, sedated.  Patient required resedation and Norcuron pushes for agitation and worsening hypoxia.  Cardene GTT was started overnight for blood pressure control.    8/25: Intubated, sedated. Sputum culture  "pulmonary results with 2+ staph aureus.  Neurology was consulted for neurological evaluation for mental status change.  Transitioned from Cardene to labetalol gtt with concern for intrapulmonary shunting.  CT head was completed and showed \"Patchy hypodensities in the right frontoparietal junction in the right parietal lobe which are nonspecific and could reflect changes of age-indeterminate infarcts/chronic microvascular disease.  Chronic appearing pontine infarct.  Extensive paranasal sinus disease.  Partial bilateral mastoid effusions\".      8/26: Intubated, sedated.    8/27: Intubated, sedated.  Patient underwent second bronchoscopy.  PEEP increased to 13.  Nutrition was consulted for tube feed recommendations.    8/28: Intubated, sedated.  On Flolan.  Requiring high vent settings with PEEP of 15 and FiO2 100%.    8/29: Intubated, sedated. General surgery was consulted for assessment of possible abdominal compartment syndrome.  CT of abdomen ordered for evaluation, however patient was too unstable.  Unable to  if surgical intervention would be helpful without CT scan.  Infectious disease was consulted for patient sepsis.    8/30: Intubated, sedated.  Continued as needed paralytic for increased hypoxia.    8/31: Intubated, sedated. Started on high-dose steroids with hopes to wean off ventilator.  Started on Cleviprex per CTS, patient did not tolerate beta-blockers.  Patient underwent a third bronchoscopy.  Bedside echo with bubble study negative.  Patient was retested for COVID-19 and negative.    9/1: Intubated, sedated.  Patient was started on TPN.    9/2: Intubated, sedated. Tempted to wean Flolan.  Nontunneled catheter was placed to start CRRT per nephrology.    9/3: Intubated, sedated. Palliative care was consulted.  Concerned for arts, likely complicated by volume overload.  Patient started on CRRT as he was not diuresing well.    9/4: Intubated, sedated. Patient underwent fourth bronchoscopy.  Patient " static continued to decline multiple discussions on goals of care.  Family made patient DNR and considering comfort measures.      On 9/4/2021 patient withdrew care at 1840.  She was terminally extubated.    Time of death noted to be on 9/4/2021 at 6:57 PM .  Emotional support provided to family.    PROCEDURES PERFORMED  Procedure(s):  BRONCHOSCOPY AT BEDSIDE with bilateral bronchial washing  09/04 1646 BRONCHOSCOPY  09/02 1100 Non-Tunneled Catheter    CONSULTING PHYSICIANS  Consults     Date and Time Order Name Status Description    8/29/2021  4:23 PM Inpatient Infectious Diseases Consult Completed     8/25/2021 10:33 AM Inpatient Neurology Consult General Completed     8/22/2021 11:29 AM Inpatient Nephrology Consult Completed     8/22/2021  8:39 AM Inpatient Cardiology Consult Completed     8/21/2021  4:35 AM Inpatient Cardiothoracic Surgery Consult Completed     8/21/2021  4:29 AM Inpatient Vascular Surgery Consult Completed           RESULTS REVIEW  LABS  Lab Results (last 24 hours)     Procedure Component Value Units Date/Time    POC Glucose Once [[id number]]  (Abnormal) Collected: 09/04/21 0204    Specimen: Blood Updated: 09/04/21 0205     Glucose 223 mg/dL      Comment: Serial Number: [id number]:  [id number]       POC Glucose Once [[id number]]  (Abnormal) Collected: 09/04/21 0302    Specimen: Blood Updated: 09/04/21 0303     Glucose 206 mg/dL      Comment: Serial Number: [id number]:  [id number]       Blood Gas, Arterial - [[id number]]  (Abnormal) Collected: 09/04/21 0338    Specimen: Arterial Blood Updated: 09/04/21 0351     Site Arterial Line     Asher's Test N/A     pH, Arterial 7.360 pH units      pCO2, Arterial 37.8 mm Hg      pO2, Arterial 197.2 mm Hg      HCO3, Arterial 21.4 mmol/L      Base Excess, Arterial -3.7 mmol/L      Comment: Serial Number: [id number]:  [id number]        O2 Saturation, Arterial 99.7 %      CO2 Content 22.5 mmol/L      Barometric Pressure for Blood Gas --     Comment: N/A         Modality Aerosol Mask     FIO2 100 %      Ventilator Mode ;PC     PEEP 17     Hemodilution No     Respiratory Rate 30    CBC & Differential [[id number]]  (Abnormal) Collected: 09/04/21 0359    Specimen: Blood Updated: 09/04/21 0431    Narrative:      The following orders were created for panel order CBC & Differential.  Procedure                               Abnormality         Status                     ---------                               -----------         ------                     CBC Auto Differential[[id number]]        Abnormal            Final result               Scan Slide[[id number]]                                       Final result                 Please view results for these tests on the individual orders.    Magnesium [[id number]]  (Normal) Collected: 09/04/21 0359    Specimen: Blood Updated: 09/04/21 0430     Magnesium 2.3 mg/dL     Comprehensive Metabolic Panel [[id number]]  (Abnormal) Collected: 09/04/21 0359    Specimen: Blood Updated: 09/04/21 0435     Glucose 222 mg/dL      BUN 91 mg/dL      Creatinine 2.30 mg/dL      Sodium 146 mmol/L      Potassium 4.9 mmol/L      Comment: Slight hemolysis detected by analyzer. Results may be affected.        Chloride 113 mmol/L      CO2 20.0 mmol/L      Calcium 8.3 mg/dL      Total Protein 5.6 g/dL      Albumin 2.90 g/dL      ALT (SGPT) 81 U/L      AST (SGOT) 29 U/L      Comment: Slight hemolysis detected by analyzer. Results may be affected.        Alkaline Phosphatase 101 U/L      Total Bilirubin 1.7 mg/dL      eGFR Non African Amer 31 mL/min/1.73      Globulin 2.7 gm/dL      A/G Ratio 1.1 g/dL      BUN/Creatinine Ratio 39.6     Anion Gap 13.0 mmol/L     Narrative:      GFR Normal >60  Chronic Kidney Disease <60  Kidney Failure <15      Phosphorus [[id number]]  (Abnormal) Collected: 09/04/21 0359    Specimen: Blood Updated: 09/04/21 0435     Phosphorus 6.0 mg/dL     Calcium, Ionized [[id number]]  (Normal) Collected: 09/04/21 0359    Specimen: Blood  Updated: 09/04/21 0418     Ionized Calcium 1.24 mmol/L     CBC Auto Differential [[id number]]  (Abnormal) Collected: 09/04/21 0359    Specimen: Blood Updated: 09/04/21 0431     WBC 29.70 10*3/mm3      RBC 3.23 10*6/mm3      Hemoglobin 10.5 g/dL      Hematocrit 31.5 %      MCV 97.7 fL      MCH 32.4 pg      MCHC 33.2 g/dL      RDW 14.9 %      RDW-SD 51.2 fl      MPV 10.0 fL      Platelets 115 10*3/mm3     Narrative:      The previously reported component NRBC is no longer being reported. Previous result was 0.1 /100 WBC (Reference Range: 0.0-0.2 /100 WBC) on 9/4/2021 at 0409 EDT.    Scan Slide [[id number]] Collected: 09/04/21 0359    Specimen: Blood Updated: 09/04/21 0431     Scan Slide --     Comment: See Manual Differential Results       Manual Differential [[id number]]  (Abnormal) Collected: 09/04/21 0359    Specimen: Blood Updated: 09/04/21 0432     Neutrophil % 79.0 %      Lymphocyte % 1.0 %      Monocyte % 4.0 %      Bands %  14.0 %      Metamyelocyte % 1.0 %      Myelocyte % 1.0 %      Neutrophils Absolute 27.62 10*3/mm3      Lymphocytes Absolute 0.30 10*3/mm3      Monocytes Absolute 1.19 10*3/mm3      RBC Morphology Normal     Vacuolated Neutrophils Slight/1+     Large Platelets Slight/1+    POC Glucose Once [[id number]]  (Abnormal) Collected: 09/04/21 0540    Specimen: Blood Updated: 09/04/21 0541     Glucose 208 mg/dL      Comment: Serial Number: [id number]:  [id number]       POC Glucose Once [[id number]]  (Abnormal) Collected: 09/04/21 0746    Specimen: Blood Updated: 09/04/21 0746     Glucose 206 mg/dL      Comment: Serial Number: [id number]:  [id number]       POC Glucose Once [[id number]]  (Abnormal) Collected: 09/04/21 1138    Specimen: Blood Updated: 09/04/21 1139     Glucose 217 mg/dL      Comment: Serial Number: [id number]:  [id number]       Pneumocystis PCR - Wash, Bronchus [[id number]] Collected: 09/04/21 1342    Specimen: Wash from Bronchus Updated: 09/04/21 1424    Fungus Culture -  Wash, Bronchus [[id number]] Collected: 09/04/21 1342    Specimen: Wash from Bronchus Updated: 09/04/21 1420    Aspergillus Galactomannan Antigen [[id number]] Collected: 09/04/21 1342    Specimen: Wash from Bronchus Updated: 09/04/21 1419    AFB Culture - Wash, Bronchus [[id number]] Collected: 09/04/21 1342    Specimen: Wash from Bronchus Updated: 09/04/21 1420    Respiratory Culture - Wash, Bronchus [[id number]] Collected: 09/04/21 1342    Specimen: Wash from Bronchus Updated: 09/04/21 1420    Respiratory Panel, PCR (WITHOUT COVID) - Wash, Bronchus [[id number]]  (Normal) Collected: 09/04/21 1342    Specimen: Wash from Bronchus Updated: 09/04/21 1906     ADENOVIRUS, PCR Not Detected     Coronavirus 229E Not Detected     Coronavirus HKU1 Not Detected     Coronavirus NL63 Not Detected     Coronavirus OC43 Not Detected     Human Metapneumovirus Not Detected     Human Rhinovirus/Enterovirus Not Detected     Influenza B PCR Not Detected     Parainfluenza Virus 1 Not Detected     Parainfluenza Virus 2 Not Detected     Parainfluenza Virus 3 Not Detected     Parainfluenza Virus 4 Not Detected     Bordetella pertussis pcr Not Detected     Influenza A H1 2009 PCR Not Detected     Chlamydophila pneumoniae PCR Not Detected     Mycoplasma pneumo by PCR Not Detected     Influenza A PCR Not Detected     Influenza A H3 Not Detected     Influenza A H1 Not Detected     RSV, PCR Not Detected     Bordetella parapertussis PCR Not Detected    Narrative:      The coronavirus on the RVP is NOT COVID-19 and is NOT indicative of infection with COVID-19.    In the setting of a positive respiratory panel with a viral infection PLUS a negative procalcitonin without other underlying concern for bacterial infection, consider observing off antibiotics or discontinuation of antibiotics and continue supportive care. If the respiratory panel is positive for atypical bacterial infection (Bordetella pertussis, Chlamydophila pneumoniae, or Mycoplasma  pneumoniae), consider antibiotic de-escalation to target atypical bacterial infection.    Histoplasma Antigen, CSF or BAL - Wash, Bronchus [[id number]] Collected: 09/04/21 1342    Specimen: Wash from Bronchus Updated: 09/04/21 1420    Cytomegalovirus (CMV) By PCR - Wash, Bronchus [[id number]] Collected: 09/04/21 1342    Specimen: Wash from Bronchus Updated: 09/04/21 1420    Protime-INR [[id number]]  (Abnormal) Collected: 09/04/21 1510    Specimen: Blood Updated: 09/04/21 1527     Protime 12.9 Seconds      INR 1.18    POC Glucose Once [[id number]]  (Abnormal) Collected: 09/04/21 1636    Specimen: Blood Updated: 09/04/21 1637     Glucose 169 mg/dL      Comment: Serial Number: [id number]:  [id number]       POC Glucose Once [[id number]]  (Abnormal) Collected: 09/04/21 1745    Specimen: Blood Updated: 09/04/21 1747     Glucose 164 mg/dL      Comment: Serial Number: [id number]:  [id number]             MICRO:  All cultures reviewed and negative, or pending with no growth unless otherwise designated in chart below.  Microbiology Results Abnormal     Procedure Component Value - Date/Time    Respiratory Panel, PCR (WITHOUT COVID) - Wash, Bronchus [[id number]]  (Normal) Collected: 09/04/21 1342    Lab Status: Final result Specimen: Wash from Bronchus Updated: 09/04/21 1906     ADENOVIRUS, PCR Not Detected     Coronavirus 229E Not Detected     Coronavirus HKU1 Not Detected     Coronavirus NL63 Not Detected     Coronavirus OC43 Not Detected     Human Metapneumovirus Not Detected     Human Rhinovirus/Enterovirus Not Detected     Influenza B PCR Not Detected     Parainfluenza Virus 1 Not Detected     Parainfluenza Virus 2 Not Detected     Parainfluenza Virus 3 Not Detected     Parainfluenza Virus 4 Not Detected     Bordetella pertussis pcr Not Detected     Influenza A H1 2009 PCR Not Detected     Chlamydophila pneumoniae PCR Not Detected     Mycoplasma pneumo by PCR Not Detected     Influenza A PCR Not Detected     Influenza  A H3 Not Detected     Influenza A H1 Not Detected     RSV, PCR Not Detected     Bordetella parapertussis PCR Not Detected    Narrative:      The coronavirus on the RVP is NOT COVID-19 and is NOT indicative of infection with COVID-19.    In the setting of a positive respiratory panel with a viral infection PLUS a negative procalcitonin without other underlying concern for bacterial infection, consider observing off antibiotics or discontinuation of antibiotics and continue supportive care. If the respiratory panel is positive for atypical bacterial infection (Bordetella pertussis, Chlamydophila pneumoniae, or Mycoplasma pneumoniae), consider antibiotic de-escalation to target atypical bacterial infection.    Blood Culture - Blood, Hand, Right [[id number]] Collected: 08/30/21 1415    Lab Status: Final result Specimen: Blood from Hand, Right Updated: 09/04/21 1431     Blood Culture No growth at 5 days    Blood Culture - Blood, Blood, Central Line [[id number]] Collected: 08/30/21 1333    Lab Status: Final result Specimen: Blood, Central Line Updated: 09/04/21 1345     Blood Culture No growth at 5 days    Cytomegalovirus (CMV) By PCR - Wash, Bronchus [[id number]] Collected: 08/31/21 0909    Lab Status: Final result Specimen: Wash from Bronchus Updated: 09/03/21 2308     Specimen Source WASH     Cytomegalovirus PCR Negative     Comment: -------------------ADDITIONAL INFORMATION-------------------  This test was developed and its performance characteristics  determined by [facility] in a manner consistent with CLIA  requirements. This test has not been cleared or approved by  the U.S. Food and Drug Administration.       Narrative:      Performed at:  01 - [facility]  [address] [place], [place]  [id number]  : Rahul Recinos , Phone:  [phone]    Histoplasma Antigen, CSF or BAL - Wash, Bronchus [[id number]] Collected: 08/31/21 0909    Lab Status: Final result Specimen: Wash from Bronchus  "Updated: 09/03/21 0713     Result None Detected ng/mL      Comment:                                           None Detected        Interpretation Negative     Comment: Positive results reported in ng/mL from 0.20 ng/mL to 20.00 ng/mL  Positive results above 20.00 ng/mL are reported as \"Above the  Limit of Quantification\"       Narrative:      Performed at:  [facility]zbieta Jiglu  [address] [facility] [place]  [id number]  : Al Heredia MD, Phone:  [phone]    Respiratory Culture - Wash, Bronchus [[id number]] Collected: 08/31/21 0909    Lab Status: Final result Specimen: Wash from Bronchus Updated: 09/02/21 1132     Respiratory Culture No growth     Gram Stain Few (2+) WBCs per low power field      Rare (1+) Mixed bacterial morphotypes seen on Gram Stain    COVID-19,[facility] ROUTINE, NP/OP SWAB IN TRANSPORT MEDIA OR ESWAB 72 HR TAT - Wash, Bronchus [[id number]] Collected: 08/31/21 0909    Lab Status: Final result Specimen: Wash from Bronchus Updated: 09/02/21 0338     SARS-CoV-2, DISHA Not Detected     Comment: This nucleic acid amplification test was developed and its performance  characteristics determined by [facility]. Nucleic acid  amplification tests include RT-PCR and TMA. This test has not been  FDA cleared or approved. This test has been authorized by FDA under  an Emergency Use Authorization (EUA). This test is only authorized  for the duration of time the declaration that circumstances exist  justifying the authorization of the emergency use of in vitro  diagnostic tests for detection of SARS-CoV-2 virus and/or diagnosis  of COVID-19 infection under section 564(b)(1) of the Act, 21 U.S.C.  360bbb-3(b) (1), unless the authorization is terminated or revoked  sooner.  When diagnostic testing is negative, the possibility of a false  negative result should be considered in the context of a patient's  recent exposures and the presence of clinical signs and symptoms  consistent " with COVID-19. An individual without symptoms of COVID-19  and who is not shedding SARS-CoV-2 virus would expect to have a  negative (not detected) result in this assay.       Narrative:      Performed at:  01 - [facility]  [address], [facility], [place]  [id number]  : Susannah Tesfaye [facility], Phone:  [phone]    AFB Culture - Wash, Bronchus [[id number]] Collected: 08/31/21 0909    Lab Status: Preliminary result Specimen: Wash from Bronchus Updated: 09/01/21 1221     AFB Stain No acid fast bacilli seen    Respiratory Panel, PCR (WITHOUT COVID) - Wash, Bronchus [[id number]]  (Normal) Collected: 08/31/21 0909    Lab Status: Final result Specimen: Wash from Bronchus Updated: 08/31/21 1056     ADENOVIRUS, PCR Not Detected     Coronavirus 229E Not Detected     Coronavirus HKU1 Not Detected     Coronavirus NL63 Not Detected     Coronavirus OC43 Not Detected     Human Metapneumovirus Not Detected     Human Rhinovirus/Enterovirus Not Detected     Influenza B PCR Not Detected     Parainfluenza Virus 1 Not Detected     Parainfluenza Virus 2 Not Detected     Parainfluenza Virus 3 Not Detected     Parainfluenza Virus 4 Not Detected     Bordetella pertussis pcr Not Detected     Chlamydophila pneumoniae PCR Not Detected     Mycoplasma pneumo by PCR Not Detected     Influenza A PCR Not Detected     RSV, PCR Not Detected     Bordetella parapertussis PCR Not Detected    Narrative:      The coronavirus on the RVP is NOT COVID-19 and is NOT indicative of infection with COVID-19.    In the setting of a positive respiratory panel with a viral infection PLUS a negative procalcitonin without other underlying concern for bacterial infection, consider observing off antibiotics or discontinuation of antibiotics and continue supportive care. If the respiratory panel is positive for atypical bacterial infection (Bordetella pertussis, Chlamydophila pneumoniae, or Mycoplasma pneumoniae), consider antibiotic de-escalation to target  atypical bacterial infection.    AFB Culture - Wash, Bronchus [[id number]] Collected: 08/23/21 1131    Lab Status: Preliminary result Specimen: Wash from Bronchus Updated: 08/30/21 1300     AFB Culture No AFB isolated at 1 week     AFB Stain No acid fast bacilli seen    Blood Culture - Blood, Blood, Arterial Line [[id number]] Collected: 08/25/21 0054    Lab Status: Final result Specimen: Blood, Arterial Line Updated: 08/30/21 0115     Blood Culture No growth at 5 days    Blood Culture - Blood, Blood, Central Line [[id number]] Collected: 08/25/21 0054    Lab Status: Final result Specimen: Blood, Central Line Updated: 08/30/21 0115     Blood Culture No growth at 5 days    Respiratory Panel PCR w/COVID-19(SARS-CoV-2) MARY KAY/KERRIE/MALENA/PAD/COR/MAD/BUZZ In-House, NP Swab in UTM/VTM, 3-4 HR TAT - Swab, Nasopharynx [[id number]]  (Normal) Collected: 08/29/21 1246    Lab Status: Final result Specimen: Swab from Nasopharynx Updated: 08/29/21 1355     ADENOVIRUS, PCR Not Detected     Coronavirus 229E Not Detected     Coronavirus HKU1 Not Detected     Coronavirus NL63 Not Detected     Coronavirus OC43 Not Detected     COVID19 Not Detected     Human Metapneumovirus Not Detected     Human Rhinovirus/Enterovirus Not Detected     Influenza A PCR Not Detected     Influenza B PCR Not Detected     Parainfluenza Virus 1 Not Detected     Parainfluenza Virus 2 Not Detected     Parainfluenza Virus 3 Not Detected     Parainfluenza Virus 4 Not Detected     RSV, PCR Not Detected     Bordetella pertussis pcr Not Detected     Bordetella parapertussis PCR Not Detected     Chlamydophila pneumoniae PCR Not Detected     Mycoplasma pneumo by PCR Not Detected    Narrative:      In the setting of a positive respiratory panel with a viral infection PLUS a negative procalcitonin without other underlying concern for bacterial infection, consider observing off antibiotics or discontinuation of antibiotics and continue supportive care. If the respiratory panel is  "positive for atypical bacterial infection (Bordetella pertussis, Chlamydophila pneumoniae, or Mycoplasma pneumoniae), consider antibiotic de-escalation to target atypical bacterial infection.    S. Pneumo Ag Urine or CSF - Urine, Urine, Catheter [[id number]]  (Normal) Collected: 08/29/21 1246    Lab Status: Final result Specimen: Urine, Catheter Updated: 08/29/21 1315     Strep Pneumo Ag Negative    Legionella Antigen, Urine - Urine, Urine, Catheter [[id number]]  (Normal) Collected: 08/29/21 1246    Lab Status: Final result Specimen: Urine, Catheter Updated: 08/29/21 1315     LEGIONELLA ANTIGEN, URINE Negative    Pneumocystis PCR - Wash, Bronchus [[id number]] Collected: 08/23/21 1131    Lab Status: Final result Specimen: Wash from Bronchus Updated: 08/27/21 1111     Pneumocystis jiroveci DNA Negative     Comment: -------------------ADDITIONAL INFORMATION-------------------  This test was developed and its performance characteristics  determined by [facility] in a manner consistent with CLIA  requirements. This test has not been cleared or approved by  the U.S. Food and Drug Administration.  REFERENCE RANGE: Not Applicable        Specimen Source Comment     Comment: BRONCH WASH       Narrative:      Performed at:  01 - [facility] [address]  [id number]  : Rahul Recinos , Phone:  [phone]    Histoplasma Antigen, CSF or BAL - Wash, Bronchus [[id number]] Collected: 08/23/21 1131    Lab Status: Final result Specimen: Wash from Bronchus Updated: 08/26/21 1710     Result None Detected ng/mL      Comment:                                           None Detected        Interpretation Negative     Comment: Positive results reported in ng/mL from 0.20 ng/mL to 20.00 ng/mL  Positive results above 20.00 ng/mL are reported as \"Above the  Limit of Quantification\"       Narrative:      Performed at:  01 - [facility]  [address] [place] [address], [place]  " [id number]  : Al Heredia MD, Phone:  [phone]    Cytomegalovirus (CMV) By PCR - Wash, Bronchus [[id number]] Collected: 08/23/21 1131    Lab Status: Final result Specimen: Wash from Bronchus Updated: 08/26/21 0209     Specimen Source Comment     Comment: BRONCH WASH CARBOWAX        Cytomegalovirus PCR Negative     Comment: -------------------ADDITIONAL INFORMATION-------------------  This test was developed and its performance characteristics  determined by [facility] in a manner consistent with CLIA  requirements. This test has not been cleared or approved by  the U.S. Food and Drug Administration.       Narrative:      Performed at:  01 - [facility] [address]  [id number]  : Rahul Recinos , Phone:  [phone]    Respiratory Culture - Wash, Bronchus [[id number]] Collected: 08/23/21 1131    Lab Status: Final result Specimen: Wash from Bronchus Updated: 08/25/21 1230     Respiratory Culture Light growth (2+) Normal Respiratory Muna     Gram Stain Moderate (3+) WBCs per low power field      Moderate (3+) Gram positive cocci in pairs and chains      Rare (1+) Gram positive cocci in clusters    MRSA Screen, PCR (Inpatient) - Swab, Nares [[id number]]  (Normal) Collected: 08/21/21 1013    Lab Status: Final result Specimen: Swab from Nares Updated: 08/21/21 1130     MRSA PCR No MRSA Detected    COVID-19,CEPHEID/SANAZ/BDMAX,COR/MALENA/PAD/PORSCHE IN-HOUSE(OR EMERGENT/ADD-ON),NP SWAB IN TRANSPORT MEDIA 3-4 HR TAT, RT-PCR - Swab, Nasopharynx [[id number]]  (Normal) Collected: 08/21/21 0234    Lab Status: Final result Specimen: Swab from Nasopharynx Updated: 08/21/21 0334     COVID19 Not Detected    Narrative:      Fact sheet for providers: https://www.fda.gov/media/631467/download     Fact sheet for patients: https://www.fda.gov/media/563044/download  Fact sheet for providers: https://www.fda.gov/media/637052/download     Fact sheet for patients:  https://www.fda.gov/media/862512/download             RADIOLOGY:  XR Chest 1 View    Result Date: 9/4/2021   1. Stable support tubes and lines. 2. Decreased opacity in the upper left lung, likely improvement pneumonia, with otherwise similar-appearing multifocal lung opacities.  Electronically Signed By-Timothy Briones MD On:9/4/2021 10:08 AM This report was finalized on [id number] by  Timothy Briones MD.    XR Chest 1 View    Result Date: 9/3/2021   1. Worsening right upper lobe airspace disease.  No interval change in the bilateral mixed interstitial/airspace disease which can be seen with ARDS, multifocal pneumonia, or pulmonary edema. 2. The nasogastric tube tip terminates within the fundus of the stomach just below the GE junction. I would recommend advancing the nasogastric tube approximately 5 to 10 cm. 3. New small right pleural effusion.  Electronically Signed By-Chirag Nguyen MD On:9/3/2021 2:50 PM This report was finalized on [id number] by  Chirag Nguyen MD.    XR Chest 1 View    Result Date: 9/2/2021  Diffuse bilateral infiltrates persist greater on the left than the right. Aeration is similar to the prior study. Tubes and lines appear properly positioned.  Electronically Signed By-Roel Morin MD On:9/2/2021 10:43 AM This report was finalized on [id number] by  Roel Morin MD.    XR Chest 1 View    Result Date: 9/1/2021  1.Endotracheal tube in good position. 2.Patchy bilateral airspace consolidations appear slightly improved on the right, which could reflect pneumonia, pulmonary edema, or ARDS.  Electronically Signed By-Shahriar Sutton MD On:9/1/2021 7:52 AM This report was finalized on [id number] by  Shahriar Sutton MD.    XR Chest 1 View    Result Date: 8/31/2021  1.Endotracheal tube in good position. 2.Diffuse bilateral airspace consolidations appear slightly improved on the right, which could represent pneumonia, pulmonary edema, or ARDS.  Electronically Signed By-Shahriar Sutton MD  On:8/31/2021 7:44 AM This report was finalized on [id number] by  Shahriar Sutton MD.    XR Chest 1 View    Result Date: 8/29/2021  Worsening bilateral mixed interstitial/airspace disease which can be seen with pneumonia, pulmonary edema, or ARDS.  Electronically Signed By-Chirag Nguyen MD On:8/29/2021 10:12 AM This report was finalized on [id number] by  Chirag Nguyne MD.    XR Chest 1 View    Result Date: 8/28/2021  No interval change from yesterday's exam with abnormalities as described above.  Electronically Signed By-Chirag Nguyen MD On:8/28/2021 9:52 AM This report was finalized on [id number] by  Chirag Nguyen MD.    US Abdomen Limited    Result Date: 8/29/2021  Scant abdominal and pelvic ascites as noted above.  Electronically Signed By-Chirag Nguyen MD On:8/29/2021 1:14 PM This report was finalized on [id number] by  Chirag Nguyen MD.    XR Abdomen KUB    Result Date: 9/4/2021  Limited examination. The overall gas pattern is nonspecific. Nasoenteric tube is in the stomach. No distended loops of bowel are identified.  Electronically Signed By-Kahlil Louis MD On:9/4/2021 1:14 PM This report was finalized on [id number] by  Kahlil Louis MD.    XR Abdomen KUB    Result Date: 8/28/2021   1. The tip of the nasogastric tube terminates within the body of the stomach. 2. Unremarkable bowel gas pattern.  Electronically Signed By-Chirag Nguyen MD On:8/28/2021 3:11 PM This report was finalized on [id number] by  Chirag Nguyen MD.      For more specific information regarding this patient’s hospital stay at [facility], please refer to patient’s full medical record.  This death summary has been scribed by this nurse practitioner for the attending physician, Dr. Carter      I personally have examined  and interviewed the patient. I have reviewed the history, data, problems, assessment and plan with our NP.  Critical care time in direct medical management (   ) minutes  Electronically signed by Kayla Carter MD,  RUBEN DAVISON, 09/05/21, 11:59 PM EDT.           Electronically signed by Kayla Carter MD at 09/05/21 6257

## 2021-09-08 LAB
P JIROVECII DNA # SPEC NAA+PROBE: NEGATIVE {COPIES}/ML
SPECIMEN SOURCE: NORMAL

## 2021-09-09 LAB
INTERPRETATION: NEGATIVE
RESULT: NORMAL NG/ML

## 2021-09-10 LAB
CMV DNA SPEC QL NAA+PROBE: NEGATIVE
P JIROVECII DNA # SPEC NAA+PROBE: NEGATIVE {COPIES}/ML
SPECIMEN SOURCE: NORMAL
SPECIMEN SOURCE: NORMAL

## 2021-09-22 LAB — FUNGUS WND CULT: ABNORMAL

## 2021-09-28 LAB
FUNGUS WND CULT: ABNORMAL
FUNGUS WND CULT: ABNORMAL

## 2021-10-04 LAB
MYCOBACTERIUM SPEC CULT: NORMAL
NIGHT BLUE STAIN TISS: NORMAL

## 2021-10-12 LAB
MYCOBACTERIUM SPEC CULT: NORMAL
NIGHT BLUE STAIN TISS: NORMAL

## 2021-10-16 LAB
MYCOBACTERIUM SPEC CULT: NORMAL
NIGHT BLUE STAIN TISS: NORMAL

## 2025-02-25 NOTE — PLAN OF CARE
Goal Outcome Evaluation:               Updated Dr. Henley last evening of post-dialysis labs. He ordered to start NS at 100 ml/hr and to notify him of morning labs. Pt HR has still been bradycardic with SBP <130 most of the night while titrating cleviprex. He has had minimal urine output since dialysis as well. Morning P02 was higher, so RT changed vent settings to 15/100. Pt still unable to follow any commands, but has gag and cough reflex. Will continue to monitor.    The patient is a 37y Male complaining of chest pain.

## 2025-07-17 NOTE — PROGRESS NOTES
[facility]  [address] [place] [address]  Phone: [phone]  Fax: [phone]    This note was completed with the assistance of a medical student during a learning experience at [facility].  I personally performed all components of the physical examination and medical decision making. I have verified and agree with medical student’s documentation for this encounter.  I have made edits where appropriate.    Mitch Childress MD        Chief Complaint   Patient presents with    Foot Pain     Left foot pain on the top of foot.      She is a 68 y.o. female who presents for acute visit for sharp left foot pain on dorsal aspect. She went to [place] from 6/1-6/12/25. Reports that once she returned home she began having left foot pain. Believes the pain is due to increase in walking on her trip. She wore tennis shoes and hiking sandals while walking in [place]. Reports that she has worn sandals and flip flips since returning.     States the top of her left foot is swollen, puffy, and tender to palpation. It hurts for her to walk or bend her toes.  Denies any known trauma or associated symptoms.     Reports that the pain is different from her arthritis pain. Describes her arthritis pain as a dull ache. Her foot pain is more of a sharp pain. She tried Advil, helped slightly with the pain to take the sharpness away.    Went to urgent care 6/28. X-ray showed no acute process. She was advised most likely a ligament problem. Recommended foot specialist. Patient reports they prescribed her a packet of tapering steroid. Helped with the swelling, not the pain.     Prior to Visit Medications    Medication Sig Taking? Authorizing Provider   naproxen (NAPROSYN) 500 MG tablet Take 1 tablet by mouth 2 times daily (with meals) Yes Mitch Childress MD   DULoxetine (CYMBALTA) 60 MG extended release capsule Take 1 capsule by mouth 2 times daily Yes Mitch Childress MD   pantoprazole  "Pharmacy consult - total parental nutrition    Geovanny Em is a 46 y.o.male admitted with type B aortic dissection. Pharmacy consulted to dose TPN for Nonfunctional or inaccessible GI tract per Dr. Carter's request. Patient was previously on tube feeds 8/23 - 8/27 but turned off d/t issues with abdominal distention and high abdominal pressure.      Assessment  Estimated macronutrient goal requirements per RD:    TPN type: Clinimix 5/20 (2400 mL/day)  Line type: Central  Protein: 120 grams/day (~1.2 grams/kg/day)  Lipids: 250 mL of 20% lipid infusion 2 days/week  Kcal/day: 2327 kcal (~25 kcal/kg/day)    Plan    Will not increase macros today d/t patient being on a significant amount of clevidipine over the past 24 hours, which is a lipid based emulsion. Will continue TPN with the following macros per d/w RD Jenn:    Protein: 75 grams/day  Dextrose: 300 grams/day  Lipids: holding while on clevidipine  Volume: 1510 mL (63 mL/hr over 24 hrs daily)    Ionized calcium, sodium, and magnesium are still elevated so will continue to hold all electrolytes from TPN. K and phos trending down, however patient having HD today  so will not add for now. Will also hold trace elements as bilirubin is elevated. Patient still on insulin gtt for hyperglycemia. MVI to be added MWF d/t national shortage. Based on labs, will add the following electrolytes/additives to the TPN:     Date: 8/30 8/31 9/1 9/2 9/3    TPN Lytes (60mL)         CaGluc (mEq) 8 8       MgSO4 (mEq)         NaCl (mEq)         NaAcetate (mEq)         KCL (mEq)         KAcetate (mEq)         NaPhos (mmol)         Kphos (mmol)         Famotidine (mg)         MVI (10 mL) 10 10 10  10    Trace (1 mL)         Humulin R (units)             Patient also received the following electrolyte replacement per protocol:    N/A.    Pharmacy will continue to follow.          Objective  Relevant clinical data and objective history reviewed:  177.8 cm (70\")   108 kg (238 lb 12.1 oz) "   Ideal body weight: 73 kg (160 lb 15 oz)  Adjusted ideal body weight: 87.1 kg (192 lb 1 oz)  Body mass index is 34.26 kg/m².    Results from last 7 days   Lab Units 09/03/21  0831 09/03/21  0303 09/02/21  0301 09/01/21  0523 08/31/21  0537 08/30/21  1415 08/30/21  1157 08/30/21  0325 08/29/21  1246 08/29/21  0518 08/28/21  0244   SODIUM mmol/L 162* 161* 151* 150* 147*  --  149* 148*  --  150* 151*   POTASSIUM mmol/L 4.2 3.8 4.4 4.7 4.5  --  4.4 4.6  --  4.1 4.1   CHLORIDE mmol/L 130* 130* 118* 118* 115*  --  116* 116*  --  118* 118*   CO2 mmol/L 22.0 18.0* 21.0* 21.0* 21.0*  --  22.0 21.0*  --  20.0* 20.0*   GLUCOSE mg/dL 85 164* 450* 340* 177*  --  149* 150*  --  147* 158*   BUN mg/dL 88* 92* 101* 89* 81*  --  83* 85*  --  85* 65*   CREATININE mg/dL 1.84* 1.80* 1.89* 1.68* 1.84*  --  1.96* 2.06*  --  2.23* 1.90*   CALCIUM mg/dL 9.1 9.2 9.4 8.8 8.7  --  8.4* 8.5*  --  8.2* 8.4*   IONIZED CALCIUM mmol/L  --  1.44* 1.38* 1.33* 1.26  --  1.23  --   --   --  1.22   PHOSPHORUS mg/dL  --  2.6 4.0 5.0* 5.5* 5.1* 5.4*  --   --  4.8* 4.2   MAGNESIUM mg/dL  --  2.7* 2.9* 3.1* 3.0*  --  3.0* 3.0*  --  2.6 2.4   TOTAL PROTEIN g/dL 5.6* 5.7* 6.3 5.8* 6.1  --  5.7* 6.0 5.6*  --  5.9*   ALBUMIN g/dL 3.00* 3.00* 3.20* 2.50* 2.50*  --  2.50* 2.50* 2.50* 2.50* 2.80*   PREALBUMIN mg/dL  --   --   --   --   --   --  7.9*  --   --   --   --    BILIRUBIN mg/dL 2.0* 2.3* 1.7* 1.9* 2.6*  --  2.1* 1.9* 1.3*  --  0.9   ALK PHOS U/L 110 98 93 99 108  --  104 106 111  --  143*   AST (SGOT) U/L 56* 31 18 18 20  --  19 14 13  --  26   ALT (SGPT) U/L 74* 47* 35 31 29  --  27 27 26  --  43*   TRIGLYCERIDES mg/dL  --   --   --   --   --   --  157*  --   --   --  83     I/O last 3 completed shifts:  In: 7303 [I.V.:5795]  Out: 6200 [Urine:6200]  Estimated Creatinine Clearance: 60.4 mL/min (A) (by C-G formula based on SCr of 1.84 mg/dL (H)).    Dietary Orders (From admission, onward)       Start     Ordered    08/30/21 1138  NPO Diet  Diet Effective  Now      08/30/21 1141                    Windy Oneil, PharmD  12:56 EDT 9/3/2021

## (undated) DEVICE — BAPTIST FLOYD BRONCHOSCOPY: Brand: MEDLINE INDUSTRIES, INC.